# Patient Record
Sex: MALE | Race: WHITE | NOT HISPANIC OR LATINO | Employment: OTHER | URBAN - METROPOLITAN AREA
[De-identification: names, ages, dates, MRNs, and addresses within clinical notes are randomized per-mention and may not be internally consistent; named-entity substitution may affect disease eponyms.]

---

## 2018-06-20 ENCOUNTER — HOSPITAL ENCOUNTER (EMERGENCY)
Facility: CLINIC | Age: 58
Discharge: HOME | End: 2018-06-20
Attending: EMERGENCY MEDICINE | Admitting: EMERGENCY MEDICINE
Payer: COMMERCIAL

## 2018-06-20 VITALS
WEIGHT: 278 LBS | RESPIRATION RATE: 18 BRPM | HEIGHT: 72 IN | OXYGEN SATURATION: 99 % | DIASTOLIC BLOOD PRESSURE: 80 MMHG | TEMPERATURE: 98 F | SYSTOLIC BLOOD PRESSURE: 129 MMHG | BODY MASS INDEX: 37.65 KG/M2 | HEART RATE: 84 BPM

## 2018-06-20 DIAGNOSIS — R94.31 ABNORMAL ELECTROCARDIOGRAM: ICD-10-CM

## 2018-06-20 DIAGNOSIS — R07.9 ACUTE CHEST PAIN: ICD-10-CM

## 2018-06-20 DIAGNOSIS — D64.9 ANEMIA, UNSPECIFIED TYPE: ICD-10-CM

## 2018-06-20 LAB
ALBUMIN SERPL-MCNC: 3.5 G/DL (ref 3.4–5)
ALP SERPL-CCNC: 73 U/L (ref 40–150)
ALT SERPL W P-5'-P-CCNC: 26 U/L (ref 0–70)
ANION GAP SERPL CALCULATED.3IONS-SCNC: 10 MMOL/L (ref 3–14)
AST SERPL W P-5'-P-CCNC: 23 U/L (ref 0–45)
BASOPHILS # BLD AUTO: 0 10E9/L (ref 0–0.2)
BASOPHILS NFR BLD AUTO: 0.2 %
BILIRUB SERPL-MCNC: 0.4 MG/DL (ref 0.2–1.3)
BUN SERPL-MCNC: 17 MG/DL (ref 7–30)
CALCIUM SERPL-MCNC: 8.2 MG/DL (ref 8.5–10.1)
CHLORIDE SERPL-SCNC: 99 MMOL/L (ref 94–109)
CO2 SERPL-SCNC: 28 MMOL/L (ref 20–32)
CREAT SERPL-MCNC: 0.99 MG/DL (ref 0.66–1.25)
D DIMER PPP FEU-MCNC: <0.3 UG/ML FEU (ref 0–0.5)
DIFFERENTIAL METHOD BLD: ABNORMAL
EOSINOPHIL # BLD AUTO: 0.2 10E9/L (ref 0–0.7)
EOSINOPHIL NFR BLD AUTO: 3.5 %
ERYTHROCYTE [DISTWIDTH] IN BLOOD BY AUTOMATED COUNT: 15.1 % (ref 10–15)
GFR SERPL CREATININE-BSD FRML MDRD: 77 ML/MIN/1.7M2
GLUCOSE SERPL-MCNC: 156 MG/DL (ref 70–99)
HCT VFR BLD AUTO: 34.9 % (ref 40–53)
HGB BLD-MCNC: 11.9 G/DL (ref 13.3–17.7)
IMM GRANULOCYTES # BLD: 0 10E9/L (ref 0–0.4)
IMM GRANULOCYTES NFR BLD: 0.2 %
INTERPRETATION ECG - MUSE: NORMAL
LIPASE SERPL-CCNC: 130 U/L (ref 73–393)
LYMPHOCYTES # BLD AUTO: 1 10E9/L (ref 0.8–5.3)
LYMPHOCYTES NFR BLD AUTO: 16.2 %
MCH RBC QN AUTO: 32.3 PG (ref 26.5–33)
MCHC RBC AUTO-ENTMCNC: 34.1 G/DL (ref 31.5–36.5)
MCV RBC AUTO: 95 FL (ref 78–100)
MONOCYTES # BLD AUTO: 0.6 10E9/L (ref 0–1.3)
MONOCYTES NFR BLD AUTO: 9.6 %
NEUTROPHILS # BLD AUTO: 4.4 10E9/L (ref 1.6–8.3)
NEUTROPHILS NFR BLD AUTO: 70.3 %
NRBC # BLD AUTO: 0 10*3/UL
NRBC BLD AUTO-RTO: 0 /100
PLATELET # BLD AUTO: 161 10E9/L (ref 150–450)
POTASSIUM SERPL-SCNC: 4.1 MMOL/L (ref 3.4–5.3)
PROT SERPL-MCNC: 7.4 G/DL (ref 6.8–8.8)
RBC # BLD AUTO: 3.68 10E12/L (ref 4.4–5.9)
SODIUM SERPL-SCNC: 137 MMOL/L (ref 133–144)
TROPONIN I SERPL-MCNC: <0.015 UG/L (ref 0–0.04)
WBC # BLD AUTO: 6.2 10E9/L (ref 4–11)

## 2018-06-20 PROCEDURE — 85025 COMPLETE CBC W/AUTO DIFF WBC: CPT | Performed by: EMERGENCY MEDICINE

## 2018-06-20 PROCEDURE — 93005 ELECTROCARDIOGRAM TRACING: CPT

## 2018-06-20 PROCEDURE — 85379 FIBRIN DEGRADATION QUANT: CPT | Performed by: EMERGENCY MEDICINE

## 2018-06-20 PROCEDURE — 80053 COMPREHEN METABOLIC PANEL: CPT | Performed by: EMERGENCY MEDICINE

## 2018-06-20 PROCEDURE — 84484 ASSAY OF TROPONIN QUANT: CPT | Performed by: EMERGENCY MEDICINE

## 2018-06-20 PROCEDURE — 99284 EMERGENCY DEPT VISIT MOD MDM: CPT

## 2018-06-20 PROCEDURE — 83690 ASSAY OF LIPASE: CPT | Performed by: EMERGENCY MEDICINE

## 2018-06-20 ASSESSMENT — ENCOUNTER SYMPTOMS
FEVER: 0
CHEST TIGHTNESS: 1
COUGH: 0

## 2018-06-20 NOTE — ED AVS SNAPSHOT
Emergency Department    [address] [facility] [address]    Phone:  [phone]    Fax:  [phone]                                       Fer Myles   MRN: [id number]    Department:   Emergency Department   Date of Visit:  6/20/2018           Patient Information     Date Of Birth          1960        Your diagnoses for this visit were:     Acute chest pain     Abnormal electrocardiogram     Anemia, unspecified type        You were seen by Heath Wright MD.      Follow-up Information     Follow up with [facility] [address] [facility]. Schedule an appointment as soon as possible for a visit today.    Contact information:    [address] [place] [address]          Follow up with  Emergency Department.    Specialty:  EMERGENCY MEDICINE    Why:  As needed, If symptoms worsen    Contact information:    [address] [facility] [address] [phone]      Discharge References/Attachments     CHEST PAIN, UNCERTAIN CAUSE (ENGLISH)      24 Hour Appointment Hotline       To make an appointment at any [facility], call [phone] ([phone]). If you don't have a family doctor or clinic, we will help you find one. [facility] clinics are conveniently located to serve the needs of you and your family.             Review of your medicines      Our records show that you are taking the medicines listed below. If these are incorrect, please call your family doctor or clinic.        Dose / Directions Last dose taken    ACTOS PO        1 TABLET DAILY   Refills:  0        ALLOPURINOL PO        Take  by mouth.   Refills:  0        BALSALAZIDE DISODIUM PO        Take  by mouth.   Refills:  0        FERROUS GLUCONATE PO   Dose:  324 mg        Take 324 mg by mouth.   Refills:  0        LEXAPRO PO        Take  by mouth.   Refills:  0        LISINOPRIL PO        1 TABLET DAILY   Refills:  0        METFORMIN HCL PO        Take  by mouth.   Refills:  0         multivitamin per tablet   Dose:  1 tablet        Take 1 tablet by mouth daily.   Refills:  0        order for DME   Quantity:  1 Device        Equipment being ordered: tennis elbow brace   Refills:  0        PANTOPRAZOLE SODIUM PO        Take  by mouth.   Refills:  0        ZOLOFT 100 MG tablet   Generic drug:  sertraline        1 TABLET DAILY   Refills:  0                Procedures and tests performed during your visit     CBC with platelets differential    Cardiac Continuous Monitoring    Comprehensive metabolic panel    D dimer quantitative    EKG 12-lead, tracing only    Lipase    Pulse oximetry nursing    Troponin I      Orders Needing Specimen Collection     None      Pending Results     No orders found from 6/18/2018 to 6/21/2018.            Pending Culture Results     No orders found from 6/18/2018 to 6/21/2018.            Pending Results Instructions     If you had any lab results that were not finalized at the time of your Discharge, you can call the ED Lab Result RN at [phone]. You will be contacted by this team for any positive Lab results or changes in treatment. The nurses are available 7 days a week from 10A to 6:30P.  You can leave a message 24 hours per day and they will return your call.        Test Results From Your Hospital Stay        6/20/2018  1:04 PM      Component Results     Component Value Ref Range & Units Status    WBC 6.2 4.0 - 11.0 10e9/L Final    RBC Count 3.68 (L) 4.4 - 5.9 10e12/L Final    Hemoglobin 11.9 (L) 13.3 - 17.7 g/dL Final    Hematocrit 34.9 (L) 40.0 - 53.0 % Final    MCV 95 78 - 100 fl Final    MCH 32.3 26.5 - 33.0 pg Final    MCHC 34.1 31.5 - 36.5 g/dL Final    RDW 15.1 (H) 10.0 - 15.0 % Final    Platelet Count 161 150 - 450 10e9/L Final    Diff Method Automated Method  Final    % Neutrophils 70.3 % Final    % Lymphocytes 16.2 % Final    % Monocytes 9.6 % Final    % Eosinophils 3.5 % Final    % Basophils 0.2 % Final    % Immature Granulocytes 0.2 % Final    Nucleated  RBCs 0 0 /100 Final    Absolute Neutrophil 4.4 1.6 - 8.3 10e9/L Final    Absolute Lymphocytes 1.0 0.8 - 5.3 10e9/L Final    Absolute Monocytes 0.6 0.0 - 1.3 10e9/L Final    Absolute Eosinophils 0.2 0.0 - 0.7 10e9/L Final    Absolute Basophils 0.0 0.0 - 0.2 10e9/L Final    Abs Immature Granulocytes 0.0 0 - 0.4 10e9/L Final    Absolute Nucleated RBC 0.0  Final         6/20/2018  1:27 PM      Component Results     Component Value Ref Range & Units Status    Sodium 137 133 - 144 mmol/L Final    Potassium 4.1 3.4 - 5.3 mmol/L Final    Chloride 99 94 - 109 mmol/L Final    Carbon Dioxide 28 20 - 32 mmol/L Final    Anion Gap 10 3 - 14 mmol/L Final    Glucose 156 (H) 70 - 99 mg/dL Final    Urea Nitrogen 17 7 - 30 mg/dL Final    Creatinine 0.99 0.66 - 1.25 mg/dL Final    GFR Estimate 77 >60 mL/min/1.7m2 Final    Non  GFR Calc    GFR Estimate If Black >90 >60 mL/min/1.7m2 Final    African American GFR Calc    Calcium 8.2 (L) 8.5 - 10.1 mg/dL Final    Bilirubin Total 0.4 0.2 - 1.3 mg/dL Final    Albumin 3.5 3.4 - 5.0 g/dL Final    Protein Total 7.4 6.8 - 8.8 g/dL Final    Alkaline Phosphatase 73 40 - 150 U/L Final    ALT 26 0 - 70 U/L Final    AST 23 0 - 45 U/L Final         6/20/2018  1:28 PM      Component Results     Component Value Ref Range & Units Status    Troponin I ES <0.015 0.000 - 0.045 ug/L Final    The 99th percentile for upper reference range is 0.045 ug/L.  Troponin values   in the range of 0.045 - 0.120 ug/L may be associated with risks of adverse   clinical events.           6/20/2018  1:52 PM      Component Results     Component Value Ref Range & Units Status    D Dimer <0.3 0.0 - 0.50 ug/ml FEU Final    This D-dimer assay is intended for use in conjunction with a clinical pretest   probability assessment model to exclude pulmonary embolism (PE) and deep   venous thrombosis (DVT) in outpatients suspected of PE or DVT. The cut-off   value is 0.5 ug/mL FEU.                 6/20/2018  1:34 PM       Component Results     Component Value Ref Range & Units Status    Lipase 130 73 - 393 U/L Final                Clinical Quality Measure: Blood Pressure Screening     Your blood pressure was checked while you were in the emergency department today. The last reading we obtained was  BP: 110/59 . Please read the guidelines below about what these numbers mean and what you should do about them.  If your systolic blood pressure (the top number) is less than 120 and your diastolic blood pressure (the bottom number) is less than 80, then your blood pressure is normal. There is nothing more that you need to do about it.  If your systolic blood pressure (the top number) is 120-139 or your diastolic blood pressure (the bottom number) is 80-89, your blood pressure may be higher than it should be. You should have your blood pressure rechecked within a year by a primary care provider.  If your systolic blood pressure (the top number) is 140 or greater or your diastolic blood pressure (the bottom number) is 90 or greater, you may have high blood pressure. High blood pressure is treatable, but if left untreated over time it can put you at risk for heart attack, stroke, or kidney failure. You should have your blood pressure rechecked by a primary care provider within the next 4 weeks.  If your provider in the emergency department today gave you specific instructions to follow-up with your doctor or provider even sooner than that, you should follow that instruction and not wait for up to 4 weeks for your follow-up visit.        Thank you for choosing [facility]       Thank you for choosing [facility] for your care. Our goal is always to provide you with excellent care. Hearing back from our patients is one way we can continue to improve our services. Please take a few minutes to complete the written survey that you may receive in the mail after you visit with us. Thank you!        "[employer] Information     "[employer] lets you send messages  "to your doctor, view your test results, renew your prescriptions, schedule appointments and more. To sign up, go to [email or web address] . Click on \"Log in\" on the left side of the screen, which will take you to the Welcome page. Then click on \"Sign up Now\" on the right side of the page.     You will be asked to enter the access code listed below, as well as some personal information. Please follow the directions to create your username and password.     Your access code is: [email or web address]  Expires: 2018  2:07 PM     Your access code will  in 90 days. If you need help or a new code, please call your [facility] clinic or [phone].        Care EveryWhere ID     This is your Care EveryWhere ID. This could be used by other organizations to access your [facility] medical records  [id number]        Equal Access to Services     TED WONG : Hadii george Ash, waaxda lukiera, qaybta kaalmada mu, andrea sandoval . So [facility] [phone].    ATENCIÓN: Si habla español, tiene a simmons disposición servicios gratuitos de asistencia lingüística. Llame al [phone].    We comply with applicable federal civil rights laws and [place] laws. We do not discriminate on the basis of race, color, national origin, age, disability, sex, sexual orientation, or gender identity.            After Visit Summary       This is your record. Keep this with you and show to your community pharmacist(s) and doctor(s) at your next visit.                  "

## 2018-06-20 NOTE — ED PROVIDER NOTES
History     Chief Complaint:  Chest Pain    HPI   Fer Myles is a 57 year old male who presents with chest pain. 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 He states that this tightness has progressed into a dull, constant pain. The patient has not taken any medication for the pain since onset other than alkaseltzer. He denies any injuries, coughs, or fevers associated with the pain and has no history of heart, lung, or clotting issues.  He has never had any advanced cardiac testing.  He is most worried this might be his heart.  No vomiting or diarrhea.  He has never been a smoker.    Allergies:  Amoxicillin     Medications:    Actos  Allopurinol  Balsalazide disodium  Lexapro  Lisinopril  Metformin  Zoloft     Past Medical History:    Diabetes  Hypertension    Past Surgical History:    The patient does not have any pertinent past surgical history.    Family History:    No past pertinent family history.    Social History:  Patient presents alone.  Positive for alcohol use.   Negative for alcohol use.     Review of Systems   Constitutional: Negative for fever.   Respiratory: Positive for chest tightness. Negative for cough.    Cardiovascular: Positive for chest pain.   All other systems reviewed and are negative.    Physical Exam   First Vitals:  BP: 126/79  Pulse: 84  Heart Rate: 73  Temp: 98  F (36.7  C)  Resp: 18  Height: 182.9 cm (6')  Weight: 126.1 kg (278 lb)  SpO2: 97 %      Physical Exam  General: Nontoxic-appearing male sitting upright in room 4  HENT: mucous membranes moist  CV: regular rate, regular rhythm, no lower extremity edema, no JVD, palpable symmetric radial pulses  Resp: clear throughout, normal effort, no crackles or wheezing  GI: abdomen soft and nontender, no guarding, negative Madison's sign  MSK: no bony tenderness to chest  Skin: appropriately warm and dry, no erythema or vesicles to chest wall  Neuro: alert, clear  speech, oriented   Psych: normal mood and affect      Emergency Department Course   ECG:  Indication: Chest pain  Time: 1203  Vent. Rate 83 bpm. NH interval 148. QRS duration 84. QT/QTc 382/448. P-R-T axis 50 -14 27 TWI V2-V3.  Read time: 1309    Laboratory:  CBC: WBC: 6.2, HGB: 11.9, PLT: 161  CMP: Glucose 156 (H), Calcium: 8.2 (L), o/w WNL (Creatinine: 0.99)  Troponin: <0.015  D dimer: <0.3  Lipase: 130    Emergency Department Course:   Nursing notes and vitals reviewed.  1307: I performed an exam of the patient as documented above. IV inserted and blood drawn.  Labs and imaging ordered.    The patient was placed on continuous cardiac and pulse ox monitoring.  I performed electronic chart review in EPIC.    I ordered a chest x-ray.  The x-ray tech came to get the patient, though the patient at that point declined the test.  I want to talk with them soon thereafter.    1359: I rechecked the patient. Findings and plan explained to the Patient.    Impression & Plan      Medical Decision Making:  The cause of his acute chest discomfort remains unconfirmed.  I did specifically discuss with him my concern for the abnormal EKG, compared to the last available comparison in 2009, which could be consistent with coronary ischemia though this is nondiagnostic.  While some reassurance is provided by an undetectable troponin, I remain concerned about the possibility of underlying coronary disease and strongly recommended that he be hospitalized for close monitoring, serial troponins, and further care.  His normal d-dimer makes pulmonary embolism extremely unlikely.  His vital signs and chest exam do not raise high concern for edema or infiltrate or pneumothorax though he did not wish to undergo chest x-ray to help identify or rule out these possibilities.  In the end, he cited a strong preference for discharge, and accepted my caution to him that this decision may lead to permanent consequences and even death in the short-term  "due to failure to diagnose more serious underlying cause.  He demonstrates decision-making capacity.  His desire for discharge is based largely on the fact that he feels better after while in the emergency department, which he thinks is related to having passed some gas.  Is a completely benign abdominal exam and I do not think that a primary abdominal process is likely causing his presenting symptoms though this is unconfirmed.  He was explicitly asked to return here for acute worsening at any hour.  Close follow-up through clinic will be essential.    Diagnosis:    ICD-10-CM    1. Acute chest pain R07.9    2. Abnormal electrocardiogram R94.31    3. Anemia, unspecified type D64.9        This record was created at least in part using electronic voice recognition software, so please excuse any \"typos.\"    I, Dae Dial, am serving as a scribe on 6/20/2018 at 1:07 PM to personally document services performed by Heath Wright MD based on my observations and the provider's statements to me.       Dae Dial  6/20/2018    EMERGENCY DEPARTMENT       Heath Wright MD  06/20/18 1554    "

## 2018-06-20 NOTE — ED AVS SNAPSHOT
Emergency Department    [address] [place] [address]    Phone:  [phone]    Fax:  [phone]                                       Fer Myles   MRN: [id number]    Department:   Emergency Department   Date of Visit:  6/20/2018           After Visit Summary Signature Page     I have received my discharge instructions, and my questions have been answered. I have discussed any challenges I see with this plan with the nurse or doctor.    ..........................................................................................................................................  Patient/Patient Representative Signature      ..........................................................................................................................................  Patient Representative Print Name and Relationship to Patient    ..................................................               ................................................  Date                                            Time    ..........................................................................................................................................  Reviewed by Signature/Title    ...................................................              ..............................................  Date                                                            Time

## 2020-03-03 ENCOUNTER — TRANSFERRED RECORDS (OUTPATIENT)
Dept: HEALTH INFORMATION MANAGEMENT | Facility: CLINIC | Age: 60
End: 2020-03-03

## 2021-03-03 ENCOUNTER — TELEPHONE (OUTPATIENT)
Dept: WOUND CARE | Facility: CLINIC | Age: 61
End: 2021-03-03

## 2021-03-03 ENCOUNTER — TRANSFERRED RECORDS (OUTPATIENT)
Dept: HEALTH INFORMATION MANAGEMENT | Facility: CLINIC | Age: 61
End: 2021-03-03

## 2021-03-03 LAB
ALT SERPL-CCNC: 7 IU/L (ref 0–44)
AST SERPL-CCNC: 15 IU/L (ref 0–40)
CREAT SERPL-MCNC: 0.92 MG/DL (ref 0.76–1.27)
GFR SERPL CREATININE-BSD FRML MDRD: 89 ML/MIN/1.73M2
GLUCOSE SERPL-MCNC: 114 MG/DL (ref 65–99)
POTASSIUM SERPL-SCNC: 4.6 MMOL/L (ref 3.5–5.2)

## 2021-03-03 NOTE — TELEPHONE ENCOUNTER
New patient called and he has had a leg wound for the past 15 years.   He was referred by Dr. Ilda Lynn from [facility].

## 2021-03-04 ENCOUNTER — HOSPITAL ENCOUNTER (EMERGENCY)
Facility: CLINIC | Age: 61
Discharge: HOME | End: 2021-03-04
Attending: EMERGENCY MEDICINE | Admitting: EMERGENCY MEDICINE
Payer: COMMERCIAL

## 2021-03-04 ENCOUNTER — APPOINTMENT (OUTPATIENT)
Dept: ULTRASOUND IMAGING | Facility: CLINIC | Age: 61
End: 2021-03-04
Attending: EMERGENCY MEDICINE
Payer: COMMERCIAL

## 2021-03-04 VITALS
HEIGHT: 71 IN | WEIGHT: 270 LBS | SYSTOLIC BLOOD PRESSURE: 128 MMHG | OXYGEN SATURATION: 99 % | TEMPERATURE: 97.7 F | RESPIRATION RATE: 18 BRPM | BODY MASS INDEX: 37.8 KG/M2 | HEART RATE: 90 BPM | DIASTOLIC BLOOD PRESSURE: 79 MMHG

## 2021-03-04 DIAGNOSIS — I48.91 ATRIAL FIBRILLATION, UNSPECIFIED TYPE (H): ICD-10-CM

## 2021-03-04 LAB
ANION GAP SERPL CALCULATED.3IONS-SCNC: 8 MMOL/L (ref 3–14)
BASOPHILS # BLD AUTO: 0 10E9/L (ref 0–0.2)
BASOPHILS NFR BLD AUTO: 0.4 %
BUN SERPL-MCNC: 13 MG/DL (ref 7–30)
CALCIUM SERPL-MCNC: 8.7 MG/DL (ref 8.5–10.1)
CHLORIDE SERPL-SCNC: 100 MMOL/L (ref 94–109)
CO2 SERPL-SCNC: 29 MMOL/L (ref 20–32)
CREAT SERPL-MCNC: 0.73 MG/DL (ref 0.66–1.25)
DIFFERENTIAL METHOD BLD: ABNORMAL
EOSINOPHIL # BLD AUTO: 0.2 10E9/L (ref 0–0.7)
EOSINOPHIL NFR BLD AUTO: 2.3 %
ERYTHROCYTE [DISTWIDTH] IN BLOOD BY AUTOMATED COUNT: 15.6 % (ref 10–15)
GFR SERPL CREATININE-BSD FRML MDRD: >90 ML/MIN/{1.73_M2}
GLUCOSE SERPL-MCNC: 123 MG/DL (ref 70–99)
HCT VFR BLD AUTO: 39.3 % (ref 40–53)
HGB BLD-MCNC: 12.7 G/DL (ref 13.3–17.7)
IMM GRANULOCYTES # BLD: 0 10E9/L (ref 0–0.4)
IMM GRANULOCYTES NFR BLD: 0.4 %
LYMPHOCYTES # BLD AUTO: 1.5 10E9/L (ref 0.8–5.3)
LYMPHOCYTES NFR BLD AUTO: 19.1 %
MCH RBC QN AUTO: 32.6 PG (ref 26.5–33)
MCHC RBC AUTO-ENTMCNC: 32.3 G/DL (ref 31.5–36.5)
MCV RBC AUTO: 101 FL (ref 78–100)
MONOCYTES # BLD AUTO: 0.5 10E9/L (ref 0–1.3)
MONOCYTES NFR BLD AUTO: 6.5 %
NEUTROPHILS # BLD AUTO: 5.6 10E9/L (ref 1.6–8.3)
NEUTROPHILS NFR BLD AUTO: 71.3 %
NRBC # BLD AUTO: 0 10*3/UL
NRBC BLD AUTO-RTO: 0 /100
NT-PROBNP SERPL-MCNC: 705 PG/ML (ref 0–900)
PLATELET # BLD AUTO: 270 10E9/L (ref 150–450)
POTASSIUM SERPL-SCNC: 4.4 MMOL/L (ref 3.4–5.3)
RBC # BLD AUTO: 3.89 10E12/L (ref 4.4–5.9)
SODIUM SERPL-SCNC: 137 MMOL/L (ref 133–144)
TROPONIN I SERPL-MCNC: <0.015 UG/L (ref 0–0.04)
WBC # BLD AUTO: 7.9 10E9/L (ref 4–11)

## 2021-03-04 PROCEDURE — 85025 COMPLETE CBC W/AUTO DIFF WBC: CPT | Performed by: EMERGENCY MEDICINE

## 2021-03-04 PROCEDURE — 93005 ELECTROCARDIOGRAM TRACING: CPT

## 2021-03-04 PROCEDURE — 93005 ELECTROCARDIOGRAM TRACING: CPT | Mod: 76

## 2021-03-04 PROCEDURE — 84484 ASSAY OF TROPONIN QUANT: CPT | Performed by: EMERGENCY MEDICINE

## 2021-03-04 PROCEDURE — 80048 BASIC METABOLIC PNL TOTAL CA: CPT | Performed by: EMERGENCY MEDICINE

## 2021-03-04 PROCEDURE — 83880 ASSAY OF NATRIURETIC PEPTIDE: CPT | Performed by: EMERGENCY MEDICINE

## 2021-03-04 PROCEDURE — 93970 EXTREMITY STUDY: CPT

## 2021-03-04 PROCEDURE — 250N000013 HC RX MED GY IP 250 OP 250 PS 637: Performed by: EMERGENCY MEDICINE

## 2021-03-04 PROCEDURE — 99285 EMERGENCY DEPT VISIT HI MDM: CPT | Mod: 25

## 2021-03-04 RX ORDER — METOPROLOL TARTRATE 25 MG/1
25 TABLET, FILM COATED ORAL 2 TIMES DAILY
Qty: 60 TABLET | Refills: 0 | Status: SHIPPED | OUTPATIENT
Start: 2021-03-04 | End: 2021-05-18

## 2021-03-04 RX ORDER — METOPROLOL TARTRATE 25 MG/1
25 TABLET, FILM COATED ORAL ONCE
Status: COMPLETED | OUTPATIENT
Start: 2021-03-04 | End: 2021-03-04

## 2021-03-04 RX ADMIN — METOPROLOL TARTRATE 25 MG: 25 TABLET, FILM COATED ORAL at 17:09

## 2021-03-04 ASSESSMENT — MIFFLIN-ST. JEOR: SCORE: 2051.84

## 2021-03-04 ASSESSMENT — ENCOUNTER SYMPTOMS
SHORTNESS OF BREATH: 0
FEVER: 0
WOUND: 1

## 2021-03-04 NOTE — ED PROVIDER NOTES
History   Chief Complaint:  rule out DVT     HPI   Fer Myles is a 60 year old male with history of type 2 diabetes and hypertension who presents with concern for DVT. The patient states that he was seen in clinic yesterday for a cataract surgery pre-op appointment and his PA, Ilda Lynn. She was concerned for left leg swelling and an abnormal EKG, prompting additional laboratory tests. The patient states that he has chronic leg edema after surgery 15 years ago and it is standard for his legs to fluctuate in size. The patient was contacted today and advised to come to the emergency department out of concern for a DVT after the labs resulted. Care Everywhere is not accessible to view the laboratory results. His typical PCP is currently deployed in the [employer], so he is not familiar with this provider and denied any complaints on exam. Denies any shortness of breath, chest pain or fever. He does note two calf ulcers that resolve on their own.     Review of Systems   Constitutional: Negative for fever.   Respiratory: Negative for shortness of breath.    Cardiovascular: Positive for leg swelling. Negative for chest pain.   Skin: Positive for wound.   All other systems reviewed and are negative.        Allergies:  Amoxicillin     Medications:  Lisinopril   Metformin   Zoloft   Lacets   Glimepiride   Lasix   Balsalazide Disodium   Allopurinol   Actos   Co Q10     Past Medical History:    Cataract   Type 2 diabetes   Hypertension    Sleep apnea   Ulcerative colitis     Past Surgical History:    Gastric bypass   EGD   Subdural hematoma     Social History:  Presents to emergency department alone    Physical Exam     Patient Vitals for the past 24 hrs:   BP Temp Temp src Pulse Resp SpO2 Height Weight   03/04/21 1812 -- -- -- 90 18 99 % -- --   03/04/21 1745 128/79 -- -- 80 (!) 32 99 % -- --   03/04/21 1730 118/76 -- -- 93 20 98 % -- --   03/04/21 1715 123/73 -- -- 71 21 98 % -- --   03/04/21 1709 (!) 132/97 --  "-- 113 -- -- -- --   03/04/21 1700 (!) 132/97 -- -- 149 (!) 42 -- -- --   03/04/21 1630 106/74 -- -- 110 -- 99 % -- --   03/04/21 1530 116/89 -- -- 89 -- 99 % -- --   03/04/21 1515 106/85 -- -- 74 -- 99 % -- --   03/04/21 1500 122/77 -- -- 63 -- 100 % -- --   03/04/21 1445 110/68 -- -- 63 -- 98 % -- --   03/04/21 1349 (!) 122/103 97.7  F (36.5  C) Temporal 71 18 100 % 1.803 m (5' 11\") 122.5 kg (270 lb)       Physical Exam  General: Patient is alert and cooperative.  HENT:  Normal appearance.   Eyes: EOMI. Normal conjunctiva.  Neck:  Normal range of motion and appearance.   Cardiovascular:  variable rate, irregular rhythm and normal heart sounds.   Pulmonary/Chest:  Effort normal. No wheezing or crackles.  Abdominal: Soft. No distension or tenderness.     Musculoskeletal: Normal range of motion. Mild symmetric lower leg edema.   Neurological: oriented, normal strength, sensation, and coordination.   Skin: Warm and dry. Darkened changes lower legs. One small recently opened area lateral lower leg, no sign of infection.   Psychiatric: Normal mood and affect. Normal behavior and judgement.      Emergency Department Course   ECG #1  ECG taken at 14:07:39, ECG read at 1705  Atrial fibrillation with rapid ventricular response with premature ventricular or aberrantly conducted complexes. Nonspecific ST & T weave abnormality. Abnormal ECG.    No prior ECG  Rate 107 bpm. SC interval * ms. QRS duration 90 ms. QT/QTc 368/491 ms. P-R-T axes * -11 31.     ECG #2   ECG taken at 16:54:46, ECG read at 1705  Atrial fibrillation with rapid ventricular response. Nonspecific ST abnormality. Abnormal ECG.    No significant change as compared to prior, dated 03/04/2021.  Rate 120 bpm. SC interval * ms. QRS duration 86 ms. QT/QTc 340//480 ms. P-R-T axes * -13 32.     ECG #3  ECG taken at 16:57:28, ECG read at 1705  Atrial fibrillation with premature ventricular or aberrantly conducted complexes. Abnormal ECG.   No significant change as " compared to prior, dated 03/04/2021.  Rate 96 bpm. MN interval * ms. QRS duration 88 ms. QT/QTc 366/462 ms. P-R-T axes * -7 30.     Imaging:  US Lower Extremity Venous Duplex Bilateral   IMPRESSION: No DVT demonstrated.  As read by Radiology.     Laboratory:  CBC: WBC: 7.9, HGB: 12.7 (L), PLT: 270  BMP: Glucose 123 (H), o/w WNL (Creatinine: 0.73)  Troponin (Collected 1434): <0.015  BNP: 705   Emergency Department Course:    Reviewed:  I reviewed nursing notes, vitals, past medical history and care everywhere    Assessments:  1636 I obtained history and examined the patient as noted above.   1658 I reassessment I noted that the patient was in Afib. He denied any symptoms.     Interventions   1709 Lopressor 25 mg PO     Disposition:  The patient was discharged to home.       Impression & Plan   Medical Decision Making:  Asymptomatic 60-year-old male referred from clinic to the emergency department for ultrasound to rule out DVT.  He presented to clinic by his report for a preoperative evaluation for planned cataract surgery later this month.  He has some chronic lower leg edema and testing in clinic included a minimally elevated D-dimer.  He has no history of PE or DVT nor is he reporting any acute changes to his leg.  Additional testing in clinic included an EKG depicting a sinus rhythm.  Other labs including renal function and CBC are unremarkable.  An ultrasound was negative.  Interestingly, as he was being prepared for discharge, I reviewed an EKG which was performed early in his ED course which appeared to demonstrate atrial fibrillation.  I recommended a repeat EKG which clearly again read demonstrates atrial fibrillation with a rapid ventricular response.  He is completely asymptomatic with respect to this.  He has no sense of heart palpitations despite his heart rate being variably between 101 150.  He appears to be experiencing paroxysmal atrial fibrillation clearly uncertain how long this has been going on.   He was medicated with oral metoprolol and replaced on the cardiac monitor where his rate seems to generally be trending downward.  He is remained normotensive.  I explained the clinical significance of atrial fibrillation to him.  His [id number] score is 2 and therefore anticoagulation is recommended.  He declines this at this time.  He is interested in proceeding with surgery later this month and is concerned that that may delay things.  I explained to him the rationale behind anticoagulation as well as the fact that this new diagnosis may in and of itself prompted delay in preoperative clearance.  I recommended he continue his current medical regimen and will be adding metoprolol which hopefully will provide some rate control for him and advised that he contact his primary care clinic tomorrow to discuss this new issue and for recheck and further management which may include adjustment of his metoprolol, cardiology referral, and echocardiography as well as recommendations for chronic anticoagulation.    Diagnosis:    ICD-10-CM    1. Atrial fibrillation, unspecified type (H)  I48.91        Discharge Medications:  New Prescriptions    METOPROLOL TARTRATE (LOPRESSOR) 25 MG TABLET    Take 1 tablet (25 mg) by mouth 2 times daily       Scribe Disclosure:  RODNEY, Oliva Rubin, am serving as a scribe at 3:05 PM on 3/4/2021 to document services personally performed by Rupesh Irvin MD based on my observations and the provider's statements to me.           Rupesh Irvin MD  03/05/21 1200

## 2021-03-04 NOTE — ED TRIAGE NOTES
Patient comes in referred from clinic for evaluation of peripheral edema, rule out DVT, wounds on feet and elevated d-dimer in clinic yesterday. Patient is also ha a history of CHF and DM. ABCs intact. Denies chest pain or shortness of breath.

## 2021-03-05 LAB
INTERPRETATION ECG - MUSE: NORMAL
INTERPRETATION ECG - MUSE: NORMAL

## 2021-03-08 ENCOUNTER — OFFICE VISIT (OUTPATIENT)
Dept: CARDIOLOGY | Facility: CLINIC | Age: 61
End: 2021-03-08
Payer: COMMERCIAL

## 2021-03-08 VITALS
WEIGHT: 277 LBS | HEIGHT: 71 IN | BODY MASS INDEX: 38.78 KG/M2 | HEART RATE: 63 BPM | DIASTOLIC BLOOD PRESSURE: 76 MMHG | OXYGEN SATURATION: 99 % | SYSTOLIC BLOOD PRESSURE: 114 MMHG

## 2021-03-08 DIAGNOSIS — I48.91 NEW ONSET ATRIAL FIBRILLATION (H): Primary | ICD-10-CM

## 2021-03-08 LAB — INTERPRETATION ECG - MUSE: NORMAL

## 2021-03-08 PROCEDURE — 99204 OFFICE O/P NEW MOD 45 MIN: CPT | Performed by: INTERNAL MEDICINE

## 2021-03-08 PROCEDURE — 93000 ELECTROCARDIOGRAM COMPLETE: CPT | Performed by: INTERNAL MEDICINE

## 2021-03-08 RX ORDER — BLOOD SUGAR DIAGNOSTIC
STRIP MISCELLANEOUS
COMMUNITY
Start: 2021-01-17 | End: 2023-01-01

## 2021-03-08 RX ORDER — LISINOPRIL 20 MG/1
20 TABLET ORAL DAILY
COMMUNITY
Start: 2021-02-13

## 2021-03-08 RX ORDER — VITAMIN B COMPLEX
TABLET ORAL
COMMUNITY
End: 2021-05-18

## 2021-03-08 RX ORDER — FUROSEMIDE 20 MG
TABLET ORAL
COMMUNITY
Start: 2021-02-11 | End: 2021-12-29

## 2021-03-08 RX ORDER — ALLOPURINOL 100 MG/1
100 TABLET ORAL DAILY
COMMUNITY
Start: 2021-02-13 | End: 2021-12-29

## 2021-03-08 RX ORDER — LANCETS
EACH MISCELLANEOUS
COMMUNITY
Start: 2020-11-20 | End: 2023-01-01

## 2021-03-08 RX ORDER — TORSEMIDE 20 MG/1
20 TABLET ORAL DAILY
COMMUNITY
Start: 2021-03-04 | End: 2021-05-18

## 2021-03-08 RX ORDER — INDOMETHACIN 50 MG/1
50 CAPSULE ORAL
COMMUNITY
End: 2021-07-14

## 2021-03-08 RX ORDER — MULTIVIT WITH MINERALS/LUTEIN
6000 TABLET ORAL DAILY
COMMUNITY
End: 2021-07-14

## 2021-03-08 ASSESSMENT — MIFFLIN-ST. JEOR: SCORE: 2083.59
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
## 2021-03-24 NOTE — PROGRESS NOTES
Service Date: 2021      ADDENDUM:  After the clinic visit, I was informed that the patient is a regular heavy alcohol user.  He is drinking about 1 pint of brittany daily.  That will be considered in the future planning of his AFib management, including consideration of anticoagulation.         LAURYN LYNN MD             D: 2021   T: 2021   MT: iona      Name:     EMILEE BERNABE   MRN:      [id number]        Account:      [id number]   :      1960           Service Date: 2021      Document: [id number]

## 2021-04-29 ENCOUNTER — HOSPITAL ENCOUNTER (OUTPATIENT)
Dept: CARDIOLOGY | Facility: CLINIC | Age: 61
End: 2021-04-29
Attending: INTERNAL MEDICINE
Payer: COMMERCIAL

## 2021-04-29 DIAGNOSIS — I48.91 NEW ONSET ATRIAL FIBRILLATION (H): ICD-10-CM

## 2021-04-29 PROCEDURE — 93225 XTRNL ECG REC<48 HRS REC: CPT

## 2021-04-29 PROCEDURE — 255N000002 HC RX 255 OP 636: Performed by: INTERNAL MEDICINE

## 2021-04-29 PROCEDURE — 93306 TTE W/DOPPLER COMPLETE: CPT | Mod: 26 | Performed by: INTERNAL MEDICINE

## 2021-04-29 PROCEDURE — 93227 XTRNL ECG REC<48 HR R&I: CPT | Performed by: INTERNAL MEDICINE

## 2021-04-29 PROCEDURE — 999N000208 ECHOCARDIOGRAM COMPLETE

## 2021-04-29 RX ADMIN — HUMAN ALBUMIN MICROSPHERES AND PERFLUTREN 3 ML: 10; .22 INJECTION, SOLUTION INTRAVENOUS at 09:00

## 2021-05-15 ENCOUNTER — HEALTH MAINTENANCE LETTER (OUTPATIENT)
Age: 61
End: 2021-05-15

## 2021-05-18 ENCOUNTER — OFFICE VISIT (OUTPATIENT)
Dept: CARDIOLOGY | Facility: CLINIC | Age: 61
End: 2021-05-18
Attending: INTERNAL MEDICINE
Payer: COMMERCIAL

## 2021-05-18 VITALS
DIASTOLIC BLOOD PRESSURE: 84 MMHG | BODY MASS INDEX: 39.2 KG/M2 | SYSTOLIC BLOOD PRESSURE: 136 MMHG | WEIGHT: 280 LBS | HEART RATE: 69 BPM | HEIGHT: 71 IN

## 2021-05-18 DIAGNOSIS — I48.0 PAROXYSMAL ATRIAL FIBRILLATION (H): Primary | ICD-10-CM

## 2021-05-18 PROCEDURE — 99214 OFFICE O/P EST MOD 30 MIN: CPT | Performed by: INTERNAL MEDICINE

## 2021-05-18 PROCEDURE — 93000 ELECTROCARDIOGRAM COMPLETE: CPT | Performed by: INTERNAL MEDICINE

## 2021-05-18 RX ORDER — FLECAINIDE ACETATE 150 MG/1
150 TABLET ORAL 2 TIMES DAILY
Qty: 180 TABLET | Refills: 3 | Status: SHIPPED | OUTPATIENT
Start: 2021-05-18 | End: 2021-10-05 | Stop reason: SINTOL

## 2021-05-18 ASSESSMENT — MIFFLIN-ST. JEOR: SCORE: 2097.2
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
## 2021-05-18 NOTE — PROGRESS NOTES
Service Date: 05/18/2021    SUBJECTIVE:  I saw Mr. Myles for followup of atrial fibrillation.  He is a 60-year-old white male who was incidentally found to be in atrial fibrillation with a rapid ventricular rate.  When I saw him on 03/08/2021 he was in persistent atrial fibrillation.  He was not having major symptoms at that time, but he was on diuretics for leg edema.  Subsequently, he had echocardiography that reported normal cardiac function.  He was put on a Holter monitor for assessment of rate control, but surprisingly, he was found to be in paroxysmal atrial fibrillation.  Overall, he has been doing reasonably well at the present time.  He has no palpitation, shortness of breath.  He is currently on Lasix 20 mg b.i.d.  He is currently using about 12 ounces of brittany on a regular basis.  He has retired.    OBJECTIVE:    GENERAL:  On examination, blood pressure was 136/84, heart rate 69 beats per minute, body weight 280 pounds.    HEENT:  The ENT were unremarkable.  LUNGS:  Clear.    HEART:  The cardiac rhythm was regular.  Heart sounds were normal, without murmur.  ABDOMEN:  Abdominal exam showed severe obesity.    EXTREMITIES:  There was no pedal edema.    EKG showed normal sinus rhythm.    ASSESSMENT AND RECOMMENDATIONS:  Mr. Myles has paroxysmal atrial fibrillation with no apparent symptoms, but the heart rate was mildly fast during atrial fibrillation.  He has been advised to gradually reduce the use of alcohol with a goal of no more than 2 ounces of brittany per day in the future.  For management of atrial fibrillation I have switched his low dose metoprolol to Flecainide 150 mg p.o. b.i.d. for prevention of recurrent atrial fibrillation.  He will have a Zio patch monitor for 2 weeks after initiation of flecainide for about 1 week.  I will see him for followup in about 1 month.  Because of his alcohol use I would not start him on anticoagulation at the present time.      cc:    Gonzalez Caputo  [facility] [address] [place], [address]    Eren Rg MD        D: 2021   T: 2021   MT: clarissa    Name:     EMILEE BERNABE  MRN:      [id number]        Account:      [id number]   :      1960           Service Date: 2021       Document: [id number]
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
## 2021-05-25 ENCOUNTER — HOSPITAL ENCOUNTER (OUTPATIENT)
Dept: CARDIOLOGY | Facility: CLINIC | Age: 61
Discharge: HOME | End: 2021-05-25
Attending: INTERNAL MEDICINE | Admitting: INTERNAL MEDICINE
Payer: COMMERCIAL

## 2021-05-25 DIAGNOSIS — I48.0 PAROXYSMAL ATRIAL FIBRILLATION (H): ICD-10-CM

## 2021-05-25 PROCEDURE — 93248 EXT ECG>7D<15D REV&INTERPJ: CPT | Performed by: INTERNAL MEDICINE

## 2021-05-25 PROCEDURE — 93246 EXT ECG>7D<15D RECORDING: CPT

## 2021-07-14 ENCOUNTER — OFFICE VISIT (OUTPATIENT)
Dept: CARDIOLOGY | Facility: CLINIC | Age: 61
End: 2021-07-14
Payer: COMMERCIAL

## 2021-07-14 VITALS
OXYGEN SATURATION: 99 % | WEIGHT: 288.1 LBS | HEIGHT: 71 IN | HEART RATE: 69 BPM | SYSTOLIC BLOOD PRESSURE: 146 MMHG | BODY MASS INDEX: 40.33 KG/M2 | DIASTOLIC BLOOD PRESSURE: 80 MMHG

## 2021-07-14 DIAGNOSIS — I48.0 PAROXYSMAL ATRIAL FIBRILLATION (H): ICD-10-CM

## 2021-07-14 PROCEDURE — 93000 ELECTROCARDIOGRAM COMPLETE: CPT | Performed by: INTERNAL MEDICINE

## 2021-07-14 PROCEDURE — 99213 OFFICE O/P EST LOW 20 MIN: CPT | Mod: 25 | Performed by: INTERNAL MEDICINE

## 2021-07-14 ASSESSMENT — MIFFLIN-ST. JEOR: SCORE: 2133.94
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
## 2021-07-14 NOTE — PROGRESS NOTES
Service Date: 2021    HISTORY OF PRESENT ILLNESS:  I saw Mr. Myles for followup of atrial fibrillation.  He is a 60-year-old white male who initially was found to be in persistent atrial fibrillation with rapid ventricular rate, and leg edema.  Subsequently, he was found to be in paroxysmal atrial fibrillation.  During the last clinic visit on  I started him on flecainide 150 mg p.o. b.i.d.  Subsequently, the ZIO Patch monitor showed no evidence of recurrent atrial fibrillation.  He has been doing well with no palpitation, shortness of breath or fatigue.  The patient has retired recently.  He stated that he has been sober from alcohol for the last week.  He does not smoke.  He expressed that he is under much less distress after assisted.    Overall, he is quite happy with the current status with no complaints.    PHYSICAL EXAMINATION:    VITAL SIGNS:  Blood pressure was 146/80, heart rate 69 beats per minute, body weight 288 pounds.  HEENT:  Were unremarkable.  LUNGS:  Clear.  CARDIAC:  Rhythm was regular and heart sounds were normal, without murmur.  ABDOMEN:  Severe obesity.  EXTREMITIES:  There was no pedal edema.    ASSESSMENT AND RECOMMENDATIONS:  Mr. Myles is doing well symptomatically.  He tolerates flecainide well with no evidence of recurrent atrial fibrillation.  The patient has ulcerative colitis and a history of alcohol abuse.  Since he is in sinus rhythm I do not recommend long-term anticoagulation at this point.  He is encouraged to lose weight and return for followup in 6 months.      cc:    Gonzalez Mancuso, OTR/L  [facility] [address] [place], [address]    Eren Rg MD        D: 2021   T: 2021   MT: clarissa    Name:     EMILEE MYLES  MRN:      [id number]        Account:      [id number]   :      1960           Service Date: 2021       Document: [id number]

## 2021-08-26 ENCOUNTER — TELEPHONE (OUTPATIENT)
Dept: CARDIOLOGY | Facility: CLINIC | Age: 61
End: 2021-08-26

## 2021-08-26 NOTE — TELEPHONE ENCOUNTER
8/26/21 Pt LM on Afib RN line stating he had a question about his Flecainide. Attempted to call pt, reached VM, LM and requested callback  KHRain 255 pm

## 2021-08-27 NOTE — TELEPHONE ENCOUNTER
8/27/21 Spoke w patient and explained recommendations per Dr Rg  The other option, instead of flecainide, would be amiodarone or AF ablation    He stated he would like to talk with his NP who he is awaiting a call from as we speak. He would like to stop Flecainide. Informed pt that if he just stops Flecainide the chance of returning Afib is likely and encouraged pt not to do so.  Offered OV w Dr Rg to discuss options more in detail  Pt states he will call back when he decides how to move forward.  Nuria 415 pm

## 2021-08-27 NOTE — TELEPHONE ENCOUNTER
Spoke to patient who reports his energy level since he started taking flecaindie has significantly decreased.  Does not have the strenght like he did back in April.  Also notes he is more SOB with going up the stairs.  Had medication reviewed with pharmacist at his pharmacy and they felt his symptoms are caused by flecainide.  Reports HR in the 50's.  Patient did have OV with Dr Rg on 7/14 however he did not discuss this with him.  Will have Dr Rg review above for recommendations.  INGA Mckenzie

## 2021-08-27 NOTE — TELEPHONE ENCOUNTER
8/27/21 Pt called back and would like to set up appt w Dr Rg to discuss options for tx of Afib as described below.  Scheduled for 8/31 at 2:45 pm  Pt voiced understanding and agreement with plan.   Nuria 430 pm

## 2021-09-04 ENCOUNTER — HEALTH MAINTENANCE LETTER (OUTPATIENT)
Age: 61
End: 2021-09-04

## 2021-10-05 ENCOUNTER — OFFICE VISIT (OUTPATIENT)
Dept: URGENT CARE | Facility: URGENT CARE | Age: 61
End: 2021-10-05
Payer: COMMERCIAL

## 2021-10-05 ENCOUNTER — ANCILLARY PROCEDURE (OUTPATIENT)
Dept: GENERAL RADIOLOGY | Facility: CLINIC | Age: 61
End: 2021-10-05
Attending: PHYSICIAN ASSISTANT
Payer: COMMERCIAL

## 2021-10-05 VITALS
RESPIRATION RATE: 20 BRPM | SYSTOLIC BLOOD PRESSURE: 129 MMHG | DIASTOLIC BLOOD PRESSURE: 77 MMHG | OXYGEN SATURATION: 98 % | HEART RATE: 87 BPM | TEMPERATURE: 98.2 F

## 2021-10-05 DIAGNOSIS — K51.919 ULCERATIVE COLITIS WITH COMPLICATION, UNSPECIFIED LOCATION (H): ICD-10-CM

## 2021-10-05 DIAGNOSIS — J01.90 ACUTE SINUSITIS WITH COEXISTING CONDITION REQUIRING PROPHYLACTIC TREATMENT: ICD-10-CM

## 2021-10-05 DIAGNOSIS — J20.9 ACUTE BRONCHITIS WITH COEXISTING CONDITION REQUIRING PROPHYLACTIC TREATMENT: Primary | ICD-10-CM

## 2021-10-05 DIAGNOSIS — I48.91 NEW ONSET ATRIAL FIBRILLATION (H): ICD-10-CM

## 2021-10-05 DIAGNOSIS — R05.9 COUGH: ICD-10-CM

## 2021-10-05 DIAGNOSIS — D64.9 ANEMIA, UNSPECIFIED TYPE: ICD-10-CM

## 2021-10-05 DIAGNOSIS — E11.69 TYPE 2 DIABETES MELLITUS WITH OTHER SPECIFIED COMPLICATION, WITHOUT LONG-TERM CURRENT USE OF INSULIN (H): ICD-10-CM

## 2021-10-05 LAB
BASOPHILS # BLD AUTO: 0 10E3/UL (ref 0–0.2)
BASOPHILS NFR BLD AUTO: 0 %
EOSINOPHIL # BLD AUTO: 0.2 10E3/UL (ref 0–0.7)
EOSINOPHIL NFR BLD AUTO: 2 %
ERYTHROCYTE [DISTWIDTH] IN BLOOD BY AUTOMATED COUNT: 15.7 % (ref 10–15)
HCT VFR BLD AUTO: 35.2 % (ref 40–53)
HGB BLD-MCNC: 11.3 G/DL (ref 13.3–17.7)
LYMPHOCYTES # BLD AUTO: 1.1 10E3/UL (ref 0.8–5.3)
LYMPHOCYTES NFR BLD AUTO: 14 %
MCH RBC QN AUTO: 31 PG (ref 26.5–33)
MCHC RBC AUTO-ENTMCNC: 32.1 G/DL (ref 31.5–36.5)
MCV RBC AUTO: 96 FL (ref 78–100)
MONOCYTES # BLD AUTO: 0.7 10E3/UL (ref 0–1.3)
MONOCYTES NFR BLD AUTO: 9 %
NEUTROPHILS # BLD AUTO: 5.9 10E3/UL (ref 1.6–8.3)
NEUTROPHILS NFR BLD AUTO: 74 %
PLATELET # BLD AUTO: 219 10E3/UL (ref 150–450)
RBC # BLD AUTO: 3.65 10E6/UL (ref 4.4–5.9)
WBC # BLD AUTO: 7.9 10E3/UL (ref 4–11)

## 2021-10-05 PROCEDURE — U0003 INFECTIOUS AGENT DETECTION BY NUCLEIC ACID (DNA OR RNA); SEVERE ACUTE RESPIRATORY SYNDROME CORONAVIRUS 2 (SARS-COV-2) (CORONAVIRUS DISEASE [COVID-19]), AMPLIFIED PROBE TECHNIQUE, MAKING USE OF HIGH THROUGHPUT TECHNOLOGIES AS DESCRIBED BY CMS-2020-01-R: HCPCS | Performed by: PHYSICIAN ASSISTANT

## 2021-10-05 PROCEDURE — 71046 X-RAY EXAM CHEST 2 VIEWS: CPT | Performed by: RADIOLOGY

## 2021-10-05 PROCEDURE — 36415 COLL VENOUS BLD VENIPUNCTURE: CPT | Performed by: PHYSICIAN ASSISTANT

## 2021-10-05 PROCEDURE — 85025 COMPLETE CBC W/AUTO DIFF WBC: CPT | Performed by: PHYSICIAN ASSISTANT

## 2021-10-05 PROCEDURE — U0005 INFEC AGEN DETEC AMPLI PROBE: HCPCS | Performed by: PHYSICIAN ASSISTANT

## 2021-10-05 PROCEDURE — 99203 OFFICE O/P NEW LOW 30 MIN: CPT | Performed by: PHYSICIAN ASSISTANT

## 2021-10-05 RX ORDER — DOXYCYCLINE 100 MG/1
100 CAPSULE ORAL 2 TIMES DAILY
Qty: 20 CAPSULE | Refills: 0 | Status: SHIPPED | OUTPATIENT
Start: 2021-10-05 | End: 2021-10-15

## 2021-10-05 NOTE — PATIENT INSTRUCTIONS
Patient Education     Sinusitis (Antibiotic Treatment)    The sinuses are air-filled spaces within the bones of the face. They connect to the inside of the nose. Sinusitis is an inflammation of the tissue that lines the sinuses. Sinusitis can occur during a cold. It can also happen due to allergies to pollens and other particles in the air. Sinusitis can cause symptoms of sinus congestion and a feeling of fullness. A sinus infection causes fever, headache, and facial pain. There is often green or yellow fluid draining from the nose or into the back of the throat (post-nasal drip). You have been given antibiotics to treat this condition.   Home care    Take the full course of antibiotics as instructed. Don't stop taking them, even when you feel better.    Drink plenty of water, hot tea, and other liquids as directed by the healthcare provider. This may help thin nasal mucus. It also may help your sinuses drain fluids.    Heat may help soothe painful areas of your face. Use a towel soaked in hot water. Or,  the shower and direct the warm spray onto your face. Using a vaporizer along with a menthol rub at night may also help soothe symptoms.     An expectorant with guaifenesin may help thin nasal mucus and help your sinuses drain fluids. Talk with your provider or pharmacists before taking an over-the-counter (OTC) medicine if you have any questions about it or its side effects..    You can use an OTC decongestant, unless a similar medicine was prescribed to you. Nasal sprays work the fastest. Use one that contains phenylephrine or oxymetazoline. First blow your nose gently. Then use the spray. Don't use these medicines more often than directed on the label. If you do, your symptoms may get worse. You may also take pills that contain pseudoephedrine. Don t use products that combine multiple medicines. This is because side effects may be increased. Read labels. You can also ask the pharmacist for help. (People  with high blood pressure should not use decongestants. They can raise blood pressure.) Talk with your provider or pharmacist if you have any questions about the medicine..    OTC antihistamines may help if allergies contributed to your sinusitis. Talk with your provider or pharmacist if you have any questions about the medicine..    Don't use nasal rinses or irrigation during an acute sinus infection, unless your healthcare provider tells you to. Rinsing may spread the infection to other areas in your sinuses.    Use acetaminophen or ibuprofen to control pain, unless another pain medicine was prescribed to you. If you have chronic liver or kidney disease or ever had a stomach ulcer, talk with your healthcare provider before using these medicines. Never give aspirin to anyone under age 18 who is ill with a fever. It may cause severe liver damage.    Don't smoke. This can make symptoms worse.    Follow-up care  Follow up with your healthcare provider, or as advised.   When to seek medical advice  Call your healthcare provider if any of these occur:     Facial pain or headache that gets worse    Stiff neck    Unusual drowsiness or confusion    Swelling of your forehead or eyelids    Symptoms don't go away in 10 days    Vision problems, such as blurred or double vision    Fever of 100.4 F (38 C) or higher, or as directed by your healthcare provider  Call 911  Call 911 if any of these occur:     Seizure    Trouble breathing    Feeling dizzy or faint    Fingernails, skin or lips look blue, purple , or gray  Prevention  Here are steps you can take to help prevent an infection:     Keep good hand washing habits.    Don t have close contact with people who have sore throats, colds, or other upper respiratory infections.    Don t smoke, and stay away from secondhand smoke.    Stay up to date with of your vaccines.  [employer] last reviewed this educational content on 12/1/2019 2000-2021 [employer]. All rights  reserved. This information is not intended as a substitute for professional medical care. Always follow your healthcare professional's instructions.               Patient Education     Bronchitis, Antibiotic Treatment (Adult)    Bronchitis is an infection of the air passages (bronchial tubes) in your lungs. It often occurs when you have a cold. This illness is contagious during the first few days and is spread through the air by coughing and sneezing, or by direct contact (touching the sick person and then touching your own eyes, nose, or mouth).  Symptoms of bronchitis include cough with mucus (phlegm) and low-grade fever. Bronchitis usually lasts 7 to 14 days. Mild cases can be treated with simple home remedies. More severe infection is treated with an antibiotic.  Home care  Follow these guidelines when caring for yourself at home:    If your symptoms are severe, rest at home for the first 2 to 3 days. When you go back to your usual activities, don't let yourself get too tired.    Don't smoke. Also stay away from secondhand smoke.    You may use over-the-counter medicines to control fever or pain, unless another medicine was prescribed. If you have chronic liver or kidney disease or have ever had a stomach ulcer or gastrointestinal bleeding, talk with your healthcare provider before using these medicines. Also talk to your provider if you are taking medicine to prevent blood clots. Aspirin should never be given to anyone younger than 18 who is ill with a viral infection or fever. It may cause severe liver or brain damage.    Your appetite may be low, so a light diet is fine. Stay well hydrated by drinking 6 to 8 glasses of fluids per day. This includes water, soft drinks, sports drinks, juices, tea, or soup. Extra fluids will help loosen mucus in your nose and lungs.    Over-the-counter cough, cold, and sore-throat medicines will not shorten the length of the illness, but they may be helpful to reduce your  symptoms. Don't use decongestants if you have high blood pressure.    Finish all antibiotic medicine. Do this even if you are feeling better after only a few days.  Follow-up care  Follow up with your healthcare provider, or as advised. If you had an X-ray or ECG (electrocardiogram), a specialist will review it. You will be told of any new test results that may affect your care.  If you are age 65 or older, if you smoke, or if you have a chronic lung disease or condition that affects your immune system, ask your healthcare provider about getting a pneumococcal vaccine and a yearly flu shot (influenza vaccine).  When to seek medical advice  Call your healthcare provider right away if any of these occur:    Fever of 100.4 F (38 C) or higher, or as directed by your healthcare provider    Coughing up more sputum    Weakness, drowsiness, headache, facial pain, ear pain, or a stiff neck  Call 911  Call 911 if any of these occur.    Coughing up blood    Weakness, drowsiness, headache, or stiff neck that get worse    Trouble breathing, wheezing, or pain with breathing  [employer] last reviewed this educational content on 6/1/2018 2000-2021 [employer]. All rights reserved. This information is not intended as a substitute for professional medical care. Always follow your healthcare professional's instructions.                 October 5, 2021 [facility]:     You have been diagnosed with bronchitis and sinusitis here today. Please start the antibiotic I prescribed for you now.      As we discussed, it is possible your symptoms could be caused by a common cold virus. It is also possible you could have a Covid-19 virus.     However, due to your other medical conditions that weaken your immune system, the fact that you have colored phlegm and the fact that your symptoms are slowly worsening (now day 8 of your illness), I suspect you have a bacterial component to your illness.     You report that you are  prone to pneumonia, so a chest x-ray and complete blood count were done here today. I do not see evidence of pneumonia at this time.      A Covid-19 PCR test wa done here today.  The result typically comes back in 1-2 days (watch your MyChart or call the urgent care clinic for your result in 1-2 days).      Please continue with home comfort care measures (including as needed Tylenol) and extra rest and fluids.  Please avoid use of Ibuprofen due to your heart history.      Please stay home/self-isolate (no contact with others outside of home) until your Covid-19 test result is back (this typically takes 1-2 days), you have no fever for 24 hours (without use of fever reducing medication) and your symptoms are improving.     I have also advised you make a follow-up appointment with your primary care proivder in the next 5-7 days to recheck your symptoms and to review your lab test results (you have some mild anemia noted today) and to review your chest x-ray (you have some mild heart enlargement and mild elevation of your right diaphragm on chest x-ray today).     Your primary care provider will let you know if you need further evaluation for these findings.     Report directly to the emergency room if you have any sudden, severe worsening of your symtpoms, or if you develop any of the below:       Coughing up blood    Chest pain    Irregular heart rate     Severe fatigue     Weakness, drowsiness, headache, or stiff neck that get worse    Trouble breathing, wheezing, or pain with breathing
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
## 2021-10-06 LAB — SARS-COV-2 RNA RESP QL NAA+PROBE: NEGATIVE

## 2021-10-08 ENCOUNTER — TELEPHONE (OUTPATIENT)
Dept: SLEEP MEDICINE | Facility: CLINIC | Age: 61
End: 2021-10-08

## 2021-10-08 NOTE — TELEPHONE ENCOUNTER
Reason for call:  Other   Patient called regarding (reason for call): appointment  Additional comments: Patient had a consultation with Dr. Brown on 9-24 and is requesting a callback to schedule his overnight study    Phone number to reach patient:  Cell number on file:    Telephone Information:   Mobile [phone]       Best Time:  any    Can we leave a detailed message on this number?  YES    Travel screening: Negative

## 2021-10-14 ENCOUNTER — TRANSFERRED RECORDS (OUTPATIENT)
Dept: HEALTH INFORMATION MANAGEMENT | Facility: CLINIC | Age: 61
End: 2021-10-14
Payer: COMMERCIAL

## 2021-10-14 ENCOUNTER — TELEPHONE (OUTPATIENT)
Dept: CARDIOLOGY | Facility: CLINIC | Age: 61
End: 2021-10-14

## 2021-10-14 DIAGNOSIS — I48.0 PAROXYSMAL ATRIAL FIBRILLATION (H): Primary | ICD-10-CM

## 2021-10-14 NOTE — TELEPHONE ENCOUNTER
Received message asking to please call patient as message received from patient's PCP today, 10/14/201, that they took an EKG and it showed that the patient was in Afib with HR's in the 140's. Unclear what was recommended to patient following this EKG.    Called patient and left a voicemail asking for a callback to discuss is symptomatic as well as to see if patient wanted to make follow-up appointment with Dr. Rg. [facility] Atrial fibrillation nurse phone number as well as scheduling phone numbers provided.

## 2021-10-15 RX ORDER — METOPROLOL TARTRATE 25 MG/1
25 TABLET, FILM COATED ORAL 2 TIMES DAILY
Qty: 180 TABLET | Refills: 3 | Status: SHIPPED | OUTPATIENT
Start: 2021-10-15 | End: 2021-12-29

## 2021-10-15 NOTE — TELEPHONE ENCOUNTER
"10/15/21Recd call back from pt . He stated he has been having episodes of Afib on a daily basis per his Fitbit . Episodes last from a few seconds to up to 15\" at a time. He is asymptomatic but was at PCP yesterday and they noted it during exam. Obtained EKG from [facility] . Will have Dr Bowers review and call pt back w recommendations.  Pt states current pulse rate is 60   Pt stopped Flecainide as of 8/26 because he did not feel well on it and had Hrs in the 50's. Pt is also not on anticoagulation. CHAds-VASC is 1 ( HTN)   KHerroRN 1245 pm   "

## 2021-10-15 NOTE — TELEPHONE ENCOUNTER
10/15/21 Verbal order recd from Dr Bowers after review of EKG recd from [facility]  -start Metoprolol Tartrate 25 mg BID  - start Eliquis 5 mg BID  Schedule follow up with Dr Rg  Attempted to call pt but reached PRAVEENA CARRINGTON and requested callback.   EKG sent to HIM for scanning  [facility] 3 pm

## 2021-10-15 NOTE — TELEPHONE ENCOUNTER
10/15/21 Recd msg from TERENCE Gonsales [facility] requesting information on whether call was made to pt yesterday after he was seen at [facility] and was noted to be in Afib w RVR.  Attempted to call pt, reached VM and LM requesting callback.   Attempted to call Ilda back at # she provided ( [phone]). LM informing her 2 attempts to reach pt have been unsuccessful. Requested EKG and OV note be faxed to Afib RN . Fax # and phone # provided  KHerroRN 915 am

## 2021-10-15 NOTE — TELEPHONE ENCOUNTER
10/15/21 Spoke w pt and explained recommendations. Scheduled pt to see Dr Rg on 11/16 at 945 am in [place]. Recommended pt monitor BP and P in the next few weeks  Pt voiced understanding and agreement with plan.   [place] 5 pm

## 2021-10-18 ENCOUNTER — TELEPHONE (OUTPATIENT)
Dept: CARDIOLOGY | Facility: CLINIC | Age: 61
End: 2021-10-18

## 2021-10-30 ENCOUNTER — HEALTH MAINTENANCE LETTER (OUTPATIENT)
Age: 61
End: 2021-10-30

## 2021-11-29 ENCOUNTER — TELEPHONE (OUTPATIENT)
Dept: SLEEP MEDICINE | Facility: CLINIC | Age: 61
End: 2021-11-29
Payer: COMMERCIAL

## 2021-12-29 ENCOUNTER — OFFICE VISIT (OUTPATIENT)
Dept: CARDIOLOGY | Facility: CLINIC | Age: 61
End: 2021-12-29
Payer: COMMERCIAL

## 2021-12-29 VITALS
HEART RATE: 55 BPM | HEIGHT: 71 IN | WEIGHT: 283 LBS | OXYGEN SATURATION: 98 % | SYSTOLIC BLOOD PRESSURE: 149 MMHG | DIASTOLIC BLOOD PRESSURE: 77 MMHG | BODY MASS INDEX: 39.62 KG/M2

## 2021-12-29 DIAGNOSIS — I48.0 PAROXYSMAL ATRIAL FIBRILLATION (H): ICD-10-CM

## 2021-12-29 DIAGNOSIS — I10 ESSENTIAL HYPERTENSION: Primary | ICD-10-CM

## 2021-12-29 PROBLEM — E66.01 MORBID OBESITY (H): Status: ACTIVE | Noted: 2021-12-29

## 2021-12-29 PROCEDURE — 99214 OFFICE O/P EST MOD 30 MIN: CPT | Performed by: INTERNAL MEDICINE

## 2021-12-29 PROCEDURE — 93000 ELECTROCARDIOGRAM COMPLETE: CPT | Performed by: INTERNAL MEDICINE

## 2021-12-29 RX ORDER — AMLODIPINE BESYLATE 10 MG/1
10 TABLET ORAL DAILY
Qty: 90 TABLET | Refills: 3 | Status: ON HOLD | OUTPATIENT
Start: 2021-12-29 | End: 2022-02-21

## 2021-12-29 RX ORDER — TORSEMIDE 20 MG/1
1 TABLET ORAL EVERY 24 HOURS
COMMUNITY
Start: 2021-07-13 | End: 2023-01-03

## 2021-12-29 ASSESSMENT — MIFFLIN-ST. JEOR: SCORE: 2105.81
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
## 2021-12-29 NOTE — PROGRESS NOTES
Service Date: 2021    HISTORY OF PRESENT ILLNESS:  I saw Mr. Myles for followup of atrial fibrillation.  He is a 61-year-old white male with a history of persistent atrial fibrillation with rapid ventricular rate on initial presentation.  He subsequently was found to have paroxysmal atrial fibrillation.  He was treated with flecainide 150 mg p.o. b.i.d. for prevention of recurrent atrial fibrillation.  The patient stopped flecainide after his last visit with me on 2021.  He stated the reason for discontinuation of flecainide was due to bradycardia.  So far, he has not felt palpitations, shortness of breath or dizziness.  He has been monitoring the heart rate and blood pressure regularly at home.  There has been no rapid heart rate.  He noticed a systolic blood pressure normally around 140s and 150s.    PHYSICAL EXAMINATION:  Blood pressure was 149/77, heart rate 55 beats per minute, body weight 283 pounds.  Cardiac rhythm was regular and heart sounds were normal with no murmur.  There was no pedal edema.    EKG showed sinus bradycardia.    ASSESSMENT AND RECOMMENDATIONS:  Mr. Myles has stopped flecainide.  It appears to me he has not had any apparent recurrence of atrial fibrillation.  He is asked to stay on anticoagulation for the time being.  If he has no apparent symptomatic atrial fibrillation, it is okay for him not to restart flecainide.  I have stopped his metoprolol because of sinus bradycardia.  I added amlodipine 10 mg p.o. daily for hypertension.  He is encouraged to lose weight.  He is scheduled for Cardiology followup in a year  if he feels well.    Eren Rg MD    cc:  Gonzalez aMncuso MD  [facility] [address] [place]  [address]    Eren Rg MD        D: 2021   T: 2021   MT: az    Name:     EMIELE MYLES  MRN:      [id number]        Account:      [id number]   :      1960           Service Date: 2021       Document:  [id number]

## 2022-02-18 ENCOUNTER — HOSPITAL ENCOUNTER (INPATIENT)
Facility: CLINIC | Age: 62
LOS: 3 days | Discharge: HOME | End: 2022-02-21
Attending: EMERGENCY MEDICINE | Admitting: INTERNAL MEDICINE
Payer: COMMERCIAL

## 2022-02-18 ENCOUNTER — APPOINTMENT (OUTPATIENT)
Dept: CT IMAGING | Facility: CLINIC | Age: 62
End: 2022-02-18
Attending: EMERGENCY MEDICINE
Payer: COMMERCIAL

## 2022-02-18 DIAGNOSIS — K92.2 GASTROINTESTINAL HEMORRHAGE, UNSPECIFIED GASTROINTESTINAL HEMORRHAGE TYPE: ICD-10-CM

## 2022-02-18 DIAGNOSIS — D64.9 ANEMIA, UNSPECIFIED TYPE: ICD-10-CM

## 2022-02-18 DIAGNOSIS — K92.1 MELENA: ICD-10-CM

## 2022-02-18 DIAGNOSIS — K92.2 ACUTE UPPER GI BLEEDING: Primary | ICD-10-CM

## 2022-02-18 DIAGNOSIS — R52 PAIN: ICD-10-CM

## 2022-02-18 DIAGNOSIS — I48.0 PAROXYSMAL ATRIAL FIBRILLATION (H): ICD-10-CM

## 2022-02-18 LAB
ABO/RH(D): NORMAL
ALBUMIN SERPL-MCNC: 3 G/DL (ref 3.4–5)
ALP SERPL-CCNC: 67 U/L (ref 40–150)
ALT SERPL W P-5'-P-CCNC: 12 U/L (ref 0–70)
ANION GAP SERPL CALCULATED.3IONS-SCNC: 9 MMOL/L (ref 3–14)
ANTIBODY SCREEN: NEGATIVE
AST SERPL W P-5'-P-CCNC: 14 U/L (ref 0–45)
BASOPHILS # BLD AUTO: 0 10E3/UL (ref 0–0.2)
BASOPHILS NFR BLD AUTO: 1 %
BILIRUB SERPL-MCNC: 0.3 MG/DL (ref 0.2–1.3)
BLD PROD TYP BPU: NORMAL
BLD PROD TYP BPU: NORMAL
BLOOD COMPONENT TYPE: NORMAL
BLOOD COMPONENT TYPE: NORMAL
BUN SERPL-MCNC: 34 MG/DL (ref 7–30)
CALCIUM SERPL-MCNC: 8.7 MG/DL (ref 8.5–10.1)
CHLORIDE BLD-SCNC: 101 MMOL/L (ref 94–109)
CO2 SERPL-SCNC: 24 MMOL/L (ref 20–32)
CODING SYSTEM: NORMAL
CODING SYSTEM: NORMAL
CREAT SERPL-MCNC: 1.26 MG/DL (ref 0.66–1.25)
CROSSMATCH: NORMAL
CROSSMATCH: NORMAL
EOSINOPHIL # BLD AUTO: 0.2 10E3/UL (ref 0–0.7)
EOSINOPHIL NFR BLD AUTO: 3 %
ERYTHROCYTE [DISTWIDTH] IN BLOOD BY AUTOMATED COUNT: 15.1 % (ref 10–15)
ETHANOL SERPL-MCNC: <0.01 G/DL
GFR SERPL CREATININE-BSD FRML MDRD: 64 ML/MIN/1.73M2
GLUCOSE BLD-MCNC: 155 MG/DL (ref 70–99)
GLUCOSE BLDC GLUCOMTR-MCNC: 166 MG/DL (ref 70–99)
HCT VFR BLD AUTO: 18.5 % (ref 40–53)
HEMOCCULT STL QL: POSITIVE
HGB BLD-MCNC: 5.7 G/DL (ref 13.3–17.7)
IMM GRANULOCYTES # BLD: 0.1 10E3/UL
IMM GRANULOCYTES NFR BLD: 1 %
INR PPP: 1.17 (ref 0.85–1.15)
ISSUE DATE AND TIME: NORMAL
ISSUE DATE AND TIME: NORMAL
LACTATE SERPL-SCNC: 0.9 MMOL/L (ref 0.7–2)
LACTATE SERPL-SCNC: 3.7 MMOL/L (ref 0.7–2)
LIPASE SERPL-CCNC: 193 U/L (ref 73–393)
LYMPHOCYTES # BLD AUTO: 1.1 10E3/UL (ref 0.8–5.3)
LYMPHOCYTES NFR BLD AUTO: 15 %
MCH RBC QN AUTO: 29.8 PG (ref 26.5–33)
MCHC RBC AUTO-ENTMCNC: 30.8 G/DL (ref 31.5–36.5)
MCV RBC AUTO: 97 FL (ref 78–100)
MONOCYTES # BLD AUTO: 0.5 10E3/UL (ref 0–1.3)
MONOCYTES NFR BLD AUTO: 6 %
NEUTROPHILS # BLD AUTO: 5.9 10E3/UL (ref 1.6–8.3)
NEUTROPHILS NFR BLD AUTO: 74 %
NRBC # BLD AUTO: 0 10E3/UL
NRBC BLD AUTO-RTO: 0 /100
PLATELET # BLD AUTO: 257 10E3/UL (ref 150–450)
POTASSIUM BLD-SCNC: 4.4 MMOL/L (ref 3.4–5.3)
PROT SERPL-MCNC: 6.5 G/DL (ref 6.8–8.8)
RBC # BLD AUTO: 1.91 10E6/UL (ref 4.4–5.9)
SARS-COV-2 RNA RESP QL NAA+PROBE: NEGATIVE
SODIUM SERPL-SCNC: 134 MMOL/L (ref 133–144)
SPECIMEN EXPIRATION DATE: NORMAL
TROPONIN I SERPL HS-MCNC: 9 NG/L
UNIT ABO/RH: NORMAL
UNIT ABO/RH: NORMAL
UNIT NUMBER: NORMAL
UNIT NUMBER: NORMAL
UNIT STATUS: NORMAL
UNIT STATUS: NORMAL
UNIT TYPE ISBT: 5100
UNIT TYPE ISBT: 5100
WBC # BLD AUTO: 7.8 10E3/UL (ref 4–11)

## 2022-02-18 PROCEDURE — 96365 THER/PROPH/DIAG IV INF INIT: CPT | Mod: 59

## 2022-02-18 PROCEDURE — C9113 INJ PANTOPRAZOLE SODIUM, VIA: HCPCS | Performed by: EMERGENCY MEDICINE

## 2022-02-18 PROCEDURE — 96375 TX/PRO/DX INJ NEW DRUG ADDON: CPT

## 2022-02-18 PROCEDURE — 250N000011 HC RX IP 250 OP 636: Performed by: EMERGENCY MEDICINE

## 2022-02-18 PROCEDURE — 85610 PROTHROMBIN TIME: CPT | Performed by: EMERGENCY MEDICINE

## 2022-02-18 PROCEDURE — 86901 BLOOD TYPING SEROLOGIC RH(D): CPT | Performed by: EMERGENCY MEDICINE

## 2022-02-18 PROCEDURE — 74177 CT ABD & PELVIS W/CONTRAST: CPT

## 2022-02-18 PROCEDURE — 85025 COMPLETE CBC W/AUTO DIFF WBC: CPT | Performed by: EMERGENCY MEDICINE

## 2022-02-18 PROCEDURE — 999N000157 HC STATISTIC RCP TIME EA 10 MIN

## 2022-02-18 PROCEDURE — 83605 ASSAY OF LACTIC ACID: CPT | Performed by: EMERGENCY MEDICINE

## 2022-02-18 PROCEDURE — 250N000009 HC RX 250: Performed by: EMERGENCY MEDICINE

## 2022-02-18 PROCEDURE — 99223 1ST HOSP IP/OBS HIGH 75: CPT | Mod: AI | Performed by: INTERNAL MEDICINE

## 2022-02-18 PROCEDURE — 87635 SARS-COV-2 COVID-19 AMP PRB: CPT | Performed by: EMERGENCY MEDICINE

## 2022-02-18 PROCEDURE — 120N000001 HC R&B MED SURG/OB

## 2022-02-18 PROCEDURE — 86923 COMPATIBILITY TEST ELECTRIC: CPT | Performed by: EMERGENCY MEDICINE

## 2022-02-18 PROCEDURE — 82272 OCCULT BLD FECES 1-3 TESTS: CPT | Performed by: EMERGENCY MEDICINE

## 2022-02-18 PROCEDURE — 84484 ASSAY OF TROPONIN QUANT: CPT | Performed by: EMERGENCY MEDICINE

## 2022-02-18 PROCEDURE — 83690 ASSAY OF LIPASE: CPT | Performed by: EMERGENCY MEDICINE

## 2022-02-18 PROCEDURE — C9803 HOPD COVID-19 SPEC COLLECT: HCPCS

## 2022-02-18 PROCEDURE — 86923 COMPATIBILITY TEST ELECTRIC: CPT | Performed by: INTERNAL MEDICINE

## 2022-02-18 PROCEDURE — 36415 COLL VENOUS BLD VENIPUNCTURE: CPT | Performed by: EMERGENCY MEDICINE

## 2022-02-18 PROCEDURE — 99285 EMERGENCY DEPT VISIT HI MDM: CPT | Mod: 25

## 2022-02-18 PROCEDURE — 258N000003 HC RX IP 258 OP 636: Performed by: EMERGENCY MEDICINE

## 2022-02-18 PROCEDURE — 80053 COMPREHEN METABOLIC PANEL: CPT | Performed by: EMERGENCY MEDICINE

## 2022-02-18 PROCEDURE — P9016 RBC LEUKOCYTES REDUCED: HCPCS | Performed by: EMERGENCY MEDICINE

## 2022-02-18 PROCEDURE — 82077 ASSAY SPEC XCP UR&BREATH IA: CPT | Performed by: EMERGENCY MEDICINE

## 2022-02-18 PROCEDURE — 94660 CPAP INITIATION&MGMT: CPT

## 2022-02-18 PROCEDURE — 36430 TRANSFUSION BLD/BLD COMPNT: CPT

## 2022-02-18 PROCEDURE — 96361 HYDRATE IV INFUSION ADD-ON: CPT

## 2022-02-18 PROCEDURE — 93005 ELECTROCARDIOGRAM TRACING: CPT

## 2022-02-18 PROCEDURE — 87040 BLOOD CULTURE FOR BACTERIA: CPT | Performed by: EMERGENCY MEDICINE

## 2022-02-18 RX ORDER — NITROGLYCERIN 0.4 MG/1
0.4 TABLET SUBLINGUAL EVERY 5 MIN PRN
Status: DISCONTINUED | OUTPATIENT
Start: 2022-02-18 | End: 2022-02-21 | Stop reason: HOSPADM

## 2022-02-18 RX ORDER — CEFTRIAXONE 2 G/1
2 INJECTION, POWDER, FOR SOLUTION INTRAMUSCULAR; INTRAVENOUS EVERY EVENING
Status: DISCONTINUED | OUTPATIENT
Start: 2022-02-18 | End: 2022-02-19

## 2022-02-18 RX ORDER — PROCHLORPERAZINE 25 MG
25 SUPPOSITORY, RECTAL RECTAL EVERY 12 HOURS PRN
Status: DISCONTINUED | OUTPATIENT
Start: 2022-02-18 | End: 2022-02-21 | Stop reason: HOSPADM

## 2022-02-18 RX ORDER — OMEGA-3 FATTY ACIDS/FISH OIL 300-1000MG
800 CAPSULE ORAL 2 TIMES DAILY PRN
Status: ON HOLD | COMMUNITY
End: 2022-02-21

## 2022-02-18 RX ORDER — ONDANSETRON 4 MG/1
4 TABLET, ORALLY DISINTEGRATING ORAL EVERY 6 HOURS PRN
Status: DISCONTINUED | OUTPATIENT
Start: 2022-02-18 | End: 2022-02-21 | Stop reason: HOSPADM

## 2022-02-18 RX ORDER — IOPAMIDOL 755 MG/ML
500 INJECTION, SOLUTION INTRAVASCULAR ONCE
Status: COMPLETED | OUTPATIENT
Start: 2022-02-18 | End: 2022-02-18

## 2022-02-18 RX ORDER — LIDOCAINE 40 MG/G
CREAM TOPICAL
Status: DISCONTINUED | OUTPATIENT
Start: 2022-02-18 | End: 2022-02-18

## 2022-02-18 RX ORDER — LIDOCAINE 40 MG/G
CREAM TOPICAL
Status: DISCONTINUED | OUTPATIENT
Start: 2022-02-18 | End: 2022-02-21 | Stop reason: HOSPADM

## 2022-02-18 RX ORDER — SODIUM CHLORIDE 9 MG/ML
INJECTION, SOLUTION INTRAVENOUS CONTINUOUS
Status: DISCONTINUED | OUTPATIENT
Start: 2022-02-18 | End: 2022-02-18

## 2022-02-18 RX ORDER — ONDANSETRON 2 MG/ML
4 INJECTION INTRAMUSCULAR; INTRAVENOUS EVERY 6 HOURS PRN
Status: DISCONTINUED | OUTPATIENT
Start: 2022-02-18 | End: 2022-02-21 | Stop reason: HOSPADM

## 2022-02-18 RX ORDER — NALOXONE HYDROCHLORIDE 0.4 MG/ML
0.4 INJECTION, SOLUTION INTRAMUSCULAR; INTRAVENOUS; SUBCUTANEOUS
Status: DISCONTINUED | OUTPATIENT
Start: 2022-02-18 | End: 2022-02-21 | Stop reason: HOSPADM

## 2022-02-18 RX ORDER — SODIUM CHLORIDE 9 MG/ML
INJECTION, SOLUTION INTRAVENOUS CONTINUOUS
Status: DISCONTINUED | OUTPATIENT
Start: 2022-02-18 | End: 2022-02-21

## 2022-02-18 RX ORDER — BALSALAZIDE DISODIUM 750 MG/1
2250 CAPSULE ORAL 2 TIMES DAILY
COMMUNITY

## 2022-02-18 RX ORDER — ACETAMINOPHEN 650 MG/1
650 SUPPOSITORY RECTAL EVERY 6 HOURS PRN
Status: DISCONTINUED | OUTPATIENT
Start: 2022-02-18 | End: 2022-02-21 | Stop reason: HOSPADM

## 2022-02-18 RX ORDER — HYDROMORPHONE HCL IN WATER/PF 6 MG/30 ML
0.2 PATIENT CONTROLLED ANALGESIA SYRINGE INTRAVENOUS
Status: COMPLETED | OUTPATIENT
Start: 2022-02-18 | End: 2022-02-18

## 2022-02-18 RX ORDER — HYDROMORPHONE HCL IN WATER/PF 6 MG/30 ML
0.2 PATIENT CONTROLLED ANALGESIA SYRINGE INTRAVENOUS
Status: DISCONTINUED | OUTPATIENT
Start: 2022-02-18 | End: 2022-02-21 | Stop reason: HOSPADM

## 2022-02-18 RX ORDER — ACETAMINOPHEN 325 MG/1
650 TABLET ORAL EVERY 6 HOURS PRN
Status: DISCONTINUED | OUTPATIENT
Start: 2022-02-18 | End: 2022-02-21 | Stop reason: HOSPADM

## 2022-02-18 RX ORDER — NALOXONE HYDROCHLORIDE 0.4 MG/ML
0.2 INJECTION, SOLUTION INTRAMUSCULAR; INTRAVENOUS; SUBCUTANEOUS
Status: DISCONTINUED | OUTPATIENT
Start: 2022-02-18 | End: 2022-02-21 | Stop reason: HOSPADM

## 2022-02-18 RX ORDER — NICOTINE POLACRILEX 4 MG
15-30 LOZENGE BUCCAL
Status: DISCONTINUED | OUTPATIENT
Start: 2022-02-18 | End: 2022-02-21 | Stop reason: HOSPADM

## 2022-02-18 RX ORDER — DEXTROSE MONOHYDRATE 25 G/50ML
25-50 INJECTION, SOLUTION INTRAVENOUS
Status: DISCONTINUED | OUTPATIENT
Start: 2022-02-18 | End: 2022-02-21 | Stop reason: HOSPADM

## 2022-02-18 RX ORDER — PROCHLORPERAZINE MALEATE 10 MG
10 TABLET ORAL EVERY 6 HOURS PRN
Status: DISCONTINUED | OUTPATIENT
Start: 2022-02-18 | End: 2022-02-21 | Stop reason: HOSPADM

## 2022-02-18 RX ADMIN — SODIUM CHLORIDE 1000 ML: 9 INJECTION, SOLUTION INTRAVENOUS at 15:49

## 2022-02-18 RX ADMIN — HYDROMORPHONE HYDROCHLORIDE 0.2 MG: 0.2 INJECTION, SOLUTION INTRAMUSCULAR; INTRAVENOUS; SUBCUTANEOUS at 21:09

## 2022-02-18 RX ADMIN — IOPAMIDOL 80 ML: 755 INJECTION, SOLUTION INTRAVENOUS at 16:25

## 2022-02-18 RX ADMIN — SODIUM CHLORIDE 39 ML: 9 INJECTION, SOLUTION INTRAVENOUS at 16:25

## 2022-02-18 RX ADMIN — METRONIDAZOLE 500 MG: 500 INJECTION, SOLUTION INTRAVENOUS at 16:46

## 2022-02-18 RX ADMIN — PANTOPRAZOLE SODIUM 40 MG: 40 INJECTION, POWDER, FOR SOLUTION INTRAVENOUS at 16:46

## 2022-02-18 RX ADMIN — CEFEPIME HYDROCHLORIDE 2 G: 2 INJECTION, POWDER, FOR SOLUTION INTRAVENOUS at 16:46

## 2022-02-18 ASSESSMENT — ACTIVITIES OF DAILY LIVING (ADL)
FALL_HISTORY_WITHIN_LAST_SIX_MONTHS: NO
ADLS_ACUITY_SCORE: 12
TOILETING_ISSUES: NO
ADLS_ACUITY_SCORE: 12
WALKING_OR_CLIMBING_STAIRS_DIFFICULTY: NO
ADLS_ACUITY_SCORE: 12
DOING_ERRANDS_INDEPENDENTLY_DIFFICULTY: NO
WEAR_GLASSES_OR_BLIND: NO
ADLS_ACUITY_SCORE: 12
CONCENTRATING,_REMEMBERING_OR_MAKING_DECISIONS_DIFFICULTY: NO
DIFFICULTY_EATING/SWALLOWING: NO
ADLS_ACUITY_SCORE: 12
ADLS_ACUITY_SCORE: 12
DRESSING/BATHING_DIFFICULTY: NO

## 2022-02-18 NOTE — ED PROVIDER NOTES
History     Chief Complaint:  Melena and Hypotension     The history is provided by the patient.      Fer Myles is a 61 year old male on Eliquis with history of atrial fibrillation, hypertension, type II diabetes mellitus, subdural hematoma, alcohol abuse who presents with 4 days of black stools, lightheadedness, weakness with associated low blood pressure readings at home. With any movement, his symptoms worsen, and he has some shortness of breath as well. His stools have been loose, dark but he has not noticed any bright red blood. He reports that today, he had a syncopal episode while he was folding laundry. He states that he fell onto his bed and did not hit his head. When he regained consciousness, he reports that he was shaking. He took his blood pressure shortly after this and it was 85/38. No chest pain or shortness of breath prior to the syncopal event. Fer also mentions some diffuse back pain which began 4 days ago. This improves slightly with use of ibuprofen. He has been taking 800 mg of ibuprofen 4 times per day. He also notes that he has been having difficulty sleeping. He sleeps laying supine. Matias denies cough, fever, chills, unilateral numbness, tingling, or weakness, chest pain, hematemesis. He does note that he has a history of peptic ulcer bleed which required operation. He denies current alcohol use and states that he has not had a drink for approximately one month. He has been taking his medications for ulcerative colitis as prescribed.    Review of Systems   All other systems reviewed and are negative.    Allergies:  Amoxicillin    Medications:  Actos  Amlodipine  Eliquis  Balsalazide  Lisinopril  Torsemide  Zoloft    Past Medical History:     Alcohol abuse  Cataracts  Depression  GERD  Gout  Hypertension  HAKEEM  Paroxysmal atrial fibrillation  Subdural hematoma  Type II diabetes mellitus   Ulcerative colitis  Obesity       Past Surgical History:    Gastric bypass  "  EGD  Colonoscopy  [place] teeth extraction  Hand/finger procedure   Leg/ankle surgery  Subdural hematoma surgery    Social History:  The patient presents to the ED alone.  The patient presents to the ED via car.    Physical Exam     Patient Vitals for the past 24 hrs:   BP Temp Temp src Pulse Resp SpO2 Height Weight   02/18/22 2211 119/67 98.8  F (37.1  C) Oral 78 20 100 % -- --   02/18/22 2202 -- -- -- -- -- -- (P) 1.803 m (5' 11\") (P) 130 kg (286 lb 9.6 oz)   02/18/22 2145 101/58 98.8  F (37.1  C) -- 80 -- 99 % -- --   02/18/22 2130 109/70 -- -- 78 -- 98 % -- --   02/18/22 2115 122/62 -- -- 79 -- 99 % -- --   02/18/22 2100 122/80 98.8  F (37.1  C) Oral 78 -- 98 % -- --   02/18/22 2045 103/67 -- -- 78 -- 100 % -- --   02/18/22 2030 99/60 -- -- 76 -- 99 % -- --   02/18/22 2015 99/61 -- -- 80 -- 100 % -- --   02/18/22 2000 -- -- -- -- -- 100 % -- --   02/18/22 1945 104/63 -- -- 78 -- 100 % -- --   02/18/22 1930 92/41 -- -- 82 -- 100 % -- --   02/18/22 1915 (!) 87/43 -- -- 82 -- 95 % -- --   02/18/22 1900 91/45 -- -- 81 -- 100 % -- --   02/18/22 1855 -- 98.3  F (36.8  C) Oral -- -- 99 % -- --   02/18/22 1845 90/40 -- -- 83 -- 98 % -- --   02/18/22 1843 -- 98  F (36.7  C) Oral -- -- 99 % -- --   02/18/22 1832 133/58 -- -- 88 -- 100 % -- --   02/18/22 1827 100/52 98.1  F (36.7  C) Oral 83 -- 100 % -- --   02/18/22 1815 100/52 -- -- 83 -- 100 % -- --   02/18/22 1745 105/59 -- -- 91 -- 100 % -- --   02/18/22 1730 99/62 -- -- 79 -- 98 % -- --   02/18/22 1715 108/64 -- -- 86 -- 100 % -- --   02/18/22 1700 95/64 -- -- -- -- -- -- --   02/18/22 1600 103/58 -- -- 75 -- 100 % -- --   02/18/22 1530 98/66 -- -- 73 -- 100 % -- --   02/18/22 1510 109/49 97.4  F (36.3  C) Temporal 92 18 98 % 1.803 m (5' 11\") 132.4 kg (291 lb 14.2 oz)     Physical Exam  General: Resting on the bed.  Head: No obvious trauma to head.  Ears, Nose, Throat:  External ears normal.  Nose normal.    Eyes:  Conjunctivae clear.    CV: Regular rate and rhythm. "     Respiratory: Effort normal and breath sounds normal.  No wheezing or crackles.   Gastrointestinal: Soft.  No distension. There is mild diffuse tenderness.  There is no rigidity, no rebound and no guarding.   Musculoskeletal: chronic bilateral lower extremity edema   Neuro: Alert. Moving all extremities appropriately.  Normal speech.    Skin: Skin is warm and dry.  No rash noted.   Rectal: no gross melena on exam but dark stool noted     Emergency Department Course     ECG:  ECG taken at 1618, ECG read at 1622  Sinus rhythm with premature supraventricular complexes  Otherwise normal ECG  Now in sinus rhythm as compared to prior, dated 3/4/21.  Rate 77 bpm. MS interval 166 ms. QRS duration 88 ms. QT/QTc 402/454 ms. P-R-T axes 54 10 29.     Imaging:  CT Aortic Survey w Contrast   Final Result   IMPRESSION:    1. Although the thoracoabdominal aorta is suboptimally opacified,   there is no evidence of thoracoabdominal aortic aneurysm or   dissection.   2. No acute pathology in the chest, abdomen and pelvis.   3. Moderate atherosclerotic vascular calcification of the coronary   arteries.   4. Few scattered pulmonary nodules including 7 mm nodule in the   lingula, as per Fleischner's society criteria, recommend follow-up   exam in 6-12 month radiographic a chest CT assess for interval   stability   5. Multiple enlarged inguinal lymph nodes including 1.9 cm short axis   right inguinal node, indeterminant, could be reactive or neoplastic.      LETICIA SOUZA MD            SYSTEM ID:  [id number]      XR Chest 2 Views    (Results Pending)   Report per radiology    Laboratory:  Labs Ordered and Resulted from Time of ED Arrival to Time of ED Departure   INR - Abnormal       Result Value    INR 1.17 (*)    COMPREHENSIVE METABOLIC PANEL - Abnormal    Sodium 134      Potassium 4.4      Chloride 101      Carbon Dioxide (CO2) 24      Anion Gap 9      Urea Nitrogen 34 (*)     Creatinine 1.26 (*)     Calcium 8.7      Glucose  155 (*)     Alkaline Phosphatase 67      AST 14      ALT 12      Protein Total 6.5 (*)     Albumin 3.0 (*)     Bilirubin Total 0.3      GFR Estimate 64     LACTIC ACID WHOLE BLOOD - Abnormal    Lactic Acid 3.7 (*)    OCCULT BLOOD STOOL - Abnormal    Occult Blood Positive (*)    CBC WITH PLATELETS AND DIFFERENTIAL - Abnormal    WBC Count 7.8      RBC Count 1.91 (*)     Hemoglobin 5.7 (*)     Hematocrit 18.5 (*)     MCV 97      MCH 29.8      MCHC 30.8 (*)     RDW 15.1 (*)     Platelet Count 257      % Neutrophils 74      % Lymphocytes 15      % Monocytes 6      % Eosinophils 3      % Basophils 1      % Immature Granulocytes 1      NRBCs per 100 WBC 0      Absolute Neutrophils 5.9      Absolute Lymphocytes 1.1      Absolute Monocytes 0.5      Absolute Eosinophils 0.2      Absolute Basophils 0.0      Absolute Immature Granulocytes 0.1      Absolute NRBCs 0.0     LIPASE - Normal    Lipase 193     ETHYL ALCOHOL LEVEL - Normal    Alcohol ethyl <0.01     TROPONIN I - Normal    Troponin I High Sensitivity 9     LACTIC ACID WHOLE BLOOD - Normal    Lactic Acid 0.9     COVID-19 VIRUS (CORONAVIRUS) BY PCR - Normal    SARS CoV2 PCR Negative     TYPE AND SCREEN, ADULT    ABO/RH(D) O POS      Antibody Screen Negative      SPECIMEN EXPIRATION DATE 20220221235900     PREPARE RED BLOOD CELLS (UNIT)    CROSSMATCH Compatible      UNIT ABO/RH O Pos      Unit Number [id number]      Unit Status Ready      Blood Component Type Red Blood Cells      Product Code I9180Q08      CODING SYSTEM [id number]      UNIT TYPE ISBT 5100     PREPARE RED BLOOD CELLS (UNIT)    CROSSMATCH Compatible      UNIT ABO/RH O Pos      Unit Number [id number]      Unit Status Issued      Blood Component Type Red Blood Cells      Product Code L9322K39      CODING SYSTEM [id number]      UNIT TYPE ISBT 5100      ISSUE DATE AND TIME [id number]     PREPARE RED BLOOD CELLS (UNIT)   BLOOD CULTURE   BLOOD CULTURE   TRANSFUSE RED BLOOD CELLS (UNIT)   ABO/RH TYPE AND SCREEN       Emergency Department Course:       Reviewed:  I reviewed nursing notes, vitals, past medical history, Care Everywhere    Assessments:  1558 I obtained history and examined the patient as noted above.   1655 I rechecked the patient and explained findings.     Consults:  1738 I spoke with Dr. Llamas from [facility] regarding the patient's presentation and plan of care.   1747 I spoke with Dr. Dillard from the hospitalist service regarding the patient's presentation, findings here in the ED, and plan of care.    Interventions:  1549 NS 1 L IV  1646 Cefepime 2 g IV  1646 Flagyl 500 mg IV  1646 Protonix 40 mg IV  2109 Dilaudid 0.2 mg IV  Red blood cells 2 units IV    Disposition:  The patient was admitted to the hospital under the care of Dr. Dillard.     Impression & Plan     CMS Diagnoses: The Lactic acid level is elevated due to GI bleed, at this time there is no sign of severe sepsis or septic shock.    Medical Decision Making:  Fer Myles is a 61 year old male who presents to the emergency department for evaluation of weakness and melena.  Vital signs showing softer blood pressures but largely stable.  Broad differential was pursued include not limited to upper versus lower GI bleed, dissection, ACS, arrhythmia, PE, anemia, electrolyte, metabolic, renal dysfunction, infection, etc.  CBC without leukocytosis but significant anemia of 5.7.  Occult was positive.  Patient has history of peptic ulcers and Protonix was administered.  Initial lactate is elevated, this cleared with fluids and blood products.  No fever, normal white count, no signs of severe sepsis or septic shock.  BMP with mild elevation in creatinine and BUN but otherwise unremarkable.  No acute electrolyte or metabolic abnormalities otherwise noted.  Alcohol level negative.  Patient reports being sober for the last month.  Covid swab negative.  EKG showing sinus rhythm without acute ischemic changes.  Troponin negative.  Patient most likely week  from ongoing GI bleed.  Suspect upper given melena.  Spoke with GI they recommended Protonix twice daily and holding patient's blood thinner.  Patient was given 2 units of blood in the ER.  Patient was admitted to the floor.  Vital signs remained stable.  Hospitalist graciously accepted.    Diagnosis:    ICD-10-CM    1. Gastrointestinal hemorrhage, unspecified gastrointestinal hemorrhage type  K92.2    2. Melena  K92.1    3. Anemia, unspecified type  D64.9      Scribe Disclosure:  I, Kourtney Kuhn, am serving as a scribe at 3:34 PM on 2/18/2022 to document services personally performed by Wen Luz MD based on my observations and the provider's statements to me.        Wen Luz MD  02/18/22 3387

## 2022-02-18 NOTE — ED NOTES
[facility]  ED Nurse Handoff Report    Fer Myles is a 61 year old male   ED Chief complaint: Melena and Hypotension  . ED Diagnosis:   Final diagnoses:   Gastrointestinal hemorrhage, unspecified gastrointestinal hemorrhage type   Melena   Anemia, unspecified type     Allergies:   Allergies   Allergen Reactions     Amoxicillin Rash       Code Status: Full Code  Activity level - Baseline/Home:  Independent. Activity Level - Current:   Assist X 1. Lift room needed: No. Bariatric: Yes   Needed: No   Isolation: No. Infection: Not Applicable.     Vital Signs:   Vitals:    02/18/22 1815 02/18/22 1827 02/18/22 1832 02/18/22 1843   BP: 100/52 100/52 133/58    Pulse: 83 83 88    Resp:       Temp:  98.1  F (36.7  C)  98  F (36.7  C)   TempSrc:  Oral  Oral   SpO2: 100% 100% 100% 99%   Weight:       Height:           Cardiac Rhythm:  ,      Pain level:    Patient confused: No. Patient Falls Risk: Yes.   Elimination Status: Has voided   Patient Report - Initial Complaint: melena, general weakness. Focused Assessment: short of breath with activity, general weakness  Tests Performed: labs and imaging. Abnormal Results:  Labs Ordered and Resulted from Time of ED Arrival to Time of ED Departure   INR - Abnormal       Result Value    INR 1.17 (*)    COMPREHENSIVE METABOLIC PANEL - Abnormal    Sodium 134      Potassium 4.4      Chloride 101      Carbon Dioxide (CO2) 24      Anion Gap 9      Urea Nitrogen 34 (*)     Creatinine 1.26 (*)     Calcium 8.7      Glucose 155 (*)     Alkaline Phosphatase 67      AST 14      ALT 12      Protein Total 6.5 (*)     Albumin 3.0 (*)     Bilirubin Total 0.3      GFR Estimate 64     LACTIC ACID WHOLE BLOOD - Abnormal    Lactic Acid 3.7 (*)    OCCULT BLOOD STOOL - Abnormal    Occult Blood Positive (*)    CBC WITH PLATELETS AND DIFFERENTIAL - Abnormal    WBC Count 7.8      RBC Count 1.91 (*)     Hemoglobin 5.7 (*)     Hematocrit 18.5 (*)     MCV 97      MCH 29.8      MCHC  30.8 (*)     RDW 15.1 (*)     Platelet Count 257      % Neutrophils 74      % Lymphocytes 15      % Monocytes 6      % Eosinophils 3      % Basophils 1      % Immature Granulocytes 1      NRBCs per 100 WBC 0      Absolute Neutrophils 5.9      Absolute Lymphocytes 1.1      Absolute Monocytes 0.5      Absolute Eosinophils 0.2      Absolute Basophils 0.0      Absolute Immature Granulocytes 0.1      Absolute NRBCs 0.0     LIPASE - Normal    Lipase 193     ETHYL ALCOHOL LEVEL - Normal    Alcohol ethyl <0.01     TROPONIN I - Normal    Troponin I High Sensitivity 9     LACTIC ACID WHOLE BLOOD - Normal    Lactic Acid 0.9     COVID-19 VIRUS (CORONAVIRUS) BY PCR - Normal    SARS CoV2 PCR Negative     TYPE AND SCREEN, ADULT    ABO/RH(D) O POS      Antibody Screen Negative      SPECIMEN EXPIRATION DATE [id number]     PREPARE RED BLOOD CELLS (UNIT)    CROSSMATCH Compatible      UNIT ABO/RH O Pos      Unit Number [id number]      Unit Status Ready      Blood Component Type Red Blood Cells      Product Code O3029T69      CODING SYSTEM JIET221      UNIT TYPE ISBT 5100     PREPARE RED BLOOD CELLS (UNIT)    CROSSMATCH Compatible      UNIT ABO/RH O Pos      Unit Number [id number]      Unit Status Issued      Blood Component Type Red Blood Cells      Product Code Y8994S63      CODING SYSTEM BZDL090      UNIT TYPE ISBT 5100      ISSUE DATE AND TIME [id number]     PREPARE RED BLOOD CELLS (UNIT)   BLOOD CULTURE   BLOOD CULTURE   TRANSFUSE RED BLOOD CELLS (UNIT)   ABO/RH TYPE AND SCREEN     Family Comments: family aware of admit  OBS brochure/video discussed/provided to patient:  N/A  ED Medications:   Medications   0.9% sodium chloride BOLUS (0 mLs Intravenous Stopped 2/18/22 1700)     Followed by   sodium chloride 0.9% infusion (has no administration in time range)   0.9% sodium chloride BOLUS ( Intravenous Canceled Entry 2/18/22 1646)     Followed by   sodium chloride 0.9% infusion ( Intravenous Canceled Entry 2/18/22 1700)    ceFEPIme (MAXIPIME) 2 g vial to attach to  ml bag for ADULTS or 50 ml bag for PEDS (0 g Intravenous Stopped 2/18/22 1741)   metroNIDAZOLE (FLAGYL) infusion 500 mg (0 mg Intravenous Stopped 2/18/22 1700)   iopamidol (ISOVUE-370) solution 500 mL (80 mLs Intravenous Given 2/18/22 1625)   CT Scan Flush (39 mLs Intravenous Given 2/18/22 1625)   pantoprazole (PROTONIX) IV push injection 40 mg (40 mg Intravenous Given 2/18/22 1646)     Drips infusing:  Yes..blood. First unit started. Needs one more  For the majority of the shift, the patient's behavior Green.     Sepsis treatment initiated: No     Patient tested for COVID 19 prior to admission: YES    ED Nurse Name/Phone Number: Maame Almonte RN,   5:43 PM  RECEIVING UNIT ED HANDOFF REVIEW    Above ED Nurse Handoff Report was reviewed: Yes  Reviewed by: Keely Olmedo RN on February 18, 2022 at 9:41 PM

## 2022-02-18 NOTE — ED TRIAGE NOTES
Patient presents with concerns for low BP and black stools. Reports he has had black stools since Monday. Checked BP around 1345 at home today and reading was 85/38, checked it 10 minutes later and it read the same. With the low BP readings he has been feeling very weak. Patient also endorsing neck and back pain that started around Tuesday, which he chalked it up to his sleep number bed, so made adjustments with that with no relief. Took ibuprofen around 1300 today without relief.

## 2022-02-19 ENCOUNTER — HEALTH MAINTENANCE LETTER (OUTPATIENT)
Age: 62
End: 2022-02-19

## 2022-02-19 ENCOUNTER — APPOINTMENT (OUTPATIENT)
Dept: GENERAL RADIOLOGY | Facility: CLINIC | Age: 62
End: 2022-02-19
Attending: INTERNAL MEDICINE
Payer: COMMERCIAL

## 2022-02-19 LAB
ALBUMIN SERPL-MCNC: 2.8 G/DL (ref 3.4–5)
ALBUMIN UR-MCNC: NEGATIVE MG/DL
ALP SERPL-CCNC: 60 U/L (ref 40–150)
ALT SERPL W P-5'-P-CCNC: 10 U/L (ref 0–70)
ANION GAP SERPL CALCULATED.3IONS-SCNC: 5 MMOL/L (ref 3–14)
APPEARANCE UR: CLEAR
AST SERPL W P-5'-P-CCNC: 18 U/L (ref 0–45)
BILIRUB SERPL-MCNC: 0.3 MG/DL (ref 0.2–1.3)
BILIRUB UR QL STRIP: NEGATIVE
BLD PROD TYP BPU: NORMAL
BLOOD COMPONENT TYPE: NORMAL
BUN SERPL-MCNC: 25 MG/DL (ref 7–30)
CALCIUM SERPL-MCNC: 8.4 MG/DL (ref 8.5–10.1)
CHLORIDE BLD-SCNC: 107 MMOL/L (ref 94–109)
CO2 SERPL-SCNC: 26 MMOL/L (ref 20–32)
CODING SYSTEM: NORMAL
COLOR UR AUTO: NORMAL
CREAT SERPL-MCNC: 1.17 MG/DL (ref 0.66–1.25)
CROSSMATCH: NORMAL
ERYTHROCYTE [DISTWIDTH] IN BLOOD BY AUTOMATED COUNT: 15.4 % (ref 10–15)
GFR SERPL CREATININE-BSD FRML MDRD: 70 ML/MIN/1.73M2
GLUCOSE BLD-MCNC: 183 MG/DL (ref 70–99)
GLUCOSE BLDC GLUCOMTR-MCNC: 109 MG/DL (ref 70–99)
GLUCOSE BLDC GLUCOMTR-MCNC: 131 MG/DL (ref 70–99)
GLUCOSE BLDC GLUCOMTR-MCNC: 153 MG/DL (ref 70–99)
GLUCOSE BLDC GLUCOMTR-MCNC: 158 MG/DL (ref 70–99)
GLUCOSE BLDC GLUCOMTR-MCNC: 163 MG/DL (ref 70–99)
GLUCOSE BLDC GLUCOMTR-MCNC: 173 MG/DL (ref 70–99)
GLUCOSE BLDC GLUCOMTR-MCNC: 270 MG/DL (ref 70–99)
GLUCOSE UR STRIP-MCNC: NEGATIVE MG/DL
HCT VFR BLD AUTO: 24.5 % (ref 40–53)
HGB BLD-MCNC: 6.6 G/DL (ref 13.3–17.7)
HGB BLD-MCNC: 7.6 G/DL (ref 13.3–17.7)
HGB BLD-MCNC: 7.7 G/DL (ref 13.3–17.7)
HGB BLD-MCNC: 7.8 G/DL (ref 13.3–17.7)
HGB UR QL STRIP: NEGATIVE
INR PPP: 1.09 (ref 0.85–1.15)
ISSUE DATE AND TIME: NORMAL
KETONES UR STRIP-MCNC: NEGATIVE MG/DL
LEUKOCYTE ESTERASE UR QL STRIP: NEGATIVE
MCH RBC QN AUTO: 29.9 PG (ref 26.5–33)
MCHC RBC AUTO-ENTMCNC: 31.8 G/DL (ref 31.5–36.5)
MCV RBC AUTO: 94 FL (ref 78–100)
NITRATE UR QL: NEGATIVE
PH UR STRIP: 7 [PH] (ref 5–7)
PLATELET # BLD AUTO: 201 10E3/UL (ref 150–450)
POTASSIUM BLD-SCNC: 4.5 MMOL/L (ref 3.4–5.3)
PROT SERPL-MCNC: 6.4 G/DL (ref 6.8–8.8)
RBC # BLD AUTO: 2.61 10E6/UL (ref 4.4–5.9)
RBC URINE: 1 /HPF
SODIUM SERPL-SCNC: 138 MMOL/L (ref 133–144)
SP GR UR STRIP: 1.01 (ref 1–1.03)
SQUAMOUS EPITHELIAL: <1 /HPF
UNIT ABO/RH: NORMAL
UNIT NUMBER: NORMAL
UNIT STATUS: NORMAL
UNIT TYPE ISBT: 5100
UROBILINOGEN UR STRIP-MCNC: NORMAL MG/DL
WBC # BLD AUTO: 6.1 10E3/UL (ref 4–11)
WBC URINE: <1 /HPF

## 2022-02-19 PROCEDURE — C9113 INJ PANTOPRAZOLE SODIUM, VIA: HCPCS | Performed by: INTERNAL MEDICINE

## 2022-02-19 PROCEDURE — 999N000157 HC STATISTIC RCP TIME EA 10 MIN

## 2022-02-19 PROCEDURE — 250N000011 HC RX IP 250 OP 636: Performed by: INTERNAL MEDICINE

## 2022-02-19 PROCEDURE — 99233 SBSQ HOSP IP/OBS HIGH 50: CPT | Performed by: STUDENT IN AN ORGANIZED HEALTH CARE EDUCATION/TRAINING PROGRAM

## 2022-02-19 PROCEDURE — 120N000001 HC R&B MED SURG/OB

## 2022-02-19 PROCEDURE — 71046 X-RAY EXAM CHEST 2 VIEWS: CPT

## 2022-02-19 PROCEDURE — 85018 HEMOGLOBIN: CPT | Performed by: INTERNAL MEDICINE

## 2022-02-19 PROCEDURE — 250N000013 HC RX MED GY IP 250 OP 250 PS 637: Performed by: INTERNAL MEDICINE

## 2022-02-19 PROCEDURE — 81001 URINALYSIS AUTO W/SCOPE: CPT | Performed by: INTERNAL MEDICINE

## 2022-02-19 PROCEDURE — 85027 COMPLETE CBC AUTOMATED: CPT | Performed by: INTERNAL MEDICINE

## 2022-02-19 PROCEDURE — 258N000003 HC RX IP 258 OP 636: Performed by: INTERNAL MEDICINE

## 2022-02-19 PROCEDURE — 80053 COMPREHEN METABOLIC PANEL: CPT | Performed by: INTERNAL MEDICINE

## 2022-02-19 PROCEDURE — 85610 PROTHROMBIN TIME: CPT | Performed by: INTERNAL MEDICINE

## 2022-02-19 PROCEDURE — P9016 RBC LEUKOCYTES REDUCED: HCPCS | Performed by: INTERNAL MEDICINE

## 2022-02-19 PROCEDURE — 94660 CPAP INITIATION&MGMT: CPT

## 2022-02-19 PROCEDURE — 250N000012 HC RX MED GY IP 250 OP 636 PS 637: Performed by: INTERNAL MEDICINE

## 2022-02-19 PROCEDURE — 36415 COLL VENOUS BLD VENIPUNCTURE: CPT | Performed by: INTERNAL MEDICINE

## 2022-02-19 RX ORDER — FENTANYL CITRATE 50 UG/ML
25 INJECTION, SOLUTION INTRAMUSCULAR; INTRAVENOUS EVERY 5 MIN PRN
Status: ACTIVE | OUTPATIENT
Start: 2022-02-20 | End: 2022-02-21

## 2022-02-19 RX ORDER — NALOXONE HYDROCHLORIDE 0.4 MG/ML
0.4 INJECTION, SOLUTION INTRAMUSCULAR; INTRAVENOUS; SUBCUTANEOUS
Status: DISCONTINUED | OUTPATIENT
Start: 2022-02-19 | End: 2022-02-21 | Stop reason: HOSPADM

## 2022-02-19 RX ORDER — FLUMAZENIL 0.1 MG/ML
0.2 INJECTION, SOLUTION INTRAVENOUS
Status: DISCONTINUED | OUTPATIENT
Start: 2022-02-19 | End: 2022-02-21 | Stop reason: HOSPADM

## 2022-02-19 RX ORDER — NALOXONE HYDROCHLORIDE 0.4 MG/ML
0.2 INJECTION, SOLUTION INTRAMUSCULAR; INTRAVENOUS; SUBCUTANEOUS
Status: DISCONTINUED | OUTPATIENT
Start: 2022-02-19 | End: 2022-02-21 | Stop reason: HOSPADM

## 2022-02-19 RX ORDER — FENTANYL CITRATE 50 UG/ML
50 INJECTION, SOLUTION INTRAMUSCULAR; INTRAVENOUS
Status: COMPLETED | OUTPATIENT
Start: 2022-02-20 | End: 2022-02-20

## 2022-02-19 RX ADMIN — SODIUM CHLORIDE: 9 INJECTION, SOLUTION INTRAVENOUS at 01:55

## 2022-02-19 RX ADMIN — INSULIN ASPART 3 UNITS: 100 INJECTION, SOLUTION INTRAVENOUS; SUBCUTANEOUS at 16:22

## 2022-02-19 RX ADMIN — SODIUM CHLORIDE: 9 INJECTION, SOLUTION INTRAVENOUS at 23:34

## 2022-02-19 RX ADMIN — SODIUM CHLORIDE: 9 INJECTION, SOLUTION INTRAVENOUS at 14:13

## 2022-02-19 RX ADMIN — CEFTRIAXONE 2 G: 2 INJECTION, POWDER, FOR SOLUTION INTRAMUSCULAR; INTRAVENOUS at 01:47

## 2022-02-19 RX ADMIN — PANTOPRAZOLE SODIUM 40 MG: 40 INJECTION, POWDER, FOR SOLUTION INTRAVENOUS at 20:19

## 2022-02-19 RX ADMIN — Medication 1 MG: at 20:26

## 2022-02-19 RX ADMIN — PANTOPRAZOLE SODIUM 40 MG: 40 INJECTION, POWDER, FOR SOLUTION INTRAVENOUS at 09:10

## 2022-02-19 ASSESSMENT — ACTIVITIES OF DAILY LIVING (ADL)
ADLS_ACUITY_SCORE: 3

## 2022-02-19 NOTE — PLAN OF CARE
"VITALS: /69 (BP Location: Right arm)   Pulse 75   Temp 98.4  F (36.9  C) (Oral)   Resp 20   Ht 1.803 m (5' 11\")   Wt 130 kg (286 lb 9.6 oz)   SpO2 98%   BMI 39.97 kg/m      PAIN: Denies.     BLOOD GLUCOSE: , 2am 153, 6am 173. No insulin given: patient is NPO.    RESPIRATORY: LS clear throughout. Respiratory brought CPAP per his request.     CARDIAC/TELE: Tele SR    BOWEL SOUNDS: +X4Q     GI/: Continent urine. No bm this shift.     SKIN: Blanchable redness to sacrum. Legs lei. Edematous. Had PCD on but then later wanted them off.     LDA: L PIV,   Had 2 units blood tonight on this floor. After 1st one Hgb went from 5.7 to 6.6. Then 2nd unit on this floor stated. Also had 1 unit in the ED prior to this floor. No adverse reaction noted or reported. NS infusing at 100 /hr continuous currently.     DIET: NPO    ACTIVITY: SBA     PLAN: Stabilize Hgb and continue current POC.    Continues on Rocephin for Lactic acidosis.                           "

## 2022-02-19 NOTE — PLAN OF CARE
VSS denies pain. Positive Occult. Pt received total x3 units blood, admission hgb 5.7.  Hgb 7.8, q4 hgb checks. NPO, GI to see. Monitoring Bg's. Lactic on admission elevated, on IVFs, Lactic improved, on IV Rocephin.

## 2022-02-19 NOTE — ED NOTES
Patient up to bedside chair with SBA. Uncomfortable in bed, now causing back discomfort. Tolerating blood, no complaints.

## 2022-02-19 NOTE — PHARMACY-ADMISSION MEDICATION HISTORY
Admission medication history interview status for this patient is complete. See [facility] admission navigator for allergy information, prior to admission medications and immunization status.     Medication history interview done, indicate source(s): Patient  Medication history resources (including written lists, pill bottles, clinic record):lmed list on phone  Pharmacy: -    Changes made to PTA medication list:  Added: ellen seltzer, ibuprofen, klor  Changed: zoloft,   Reported as Not Taking: -  Removed: -    Actions taken by pharmacist (provider contacted, etc):None     Additional medication history information:None    Medication reconciliation/reorder completed by provider prior to medication history?  no   (Y/N)     For patients on insulin therapy:   Do you use sliding scale insulin based on blood sugars?   What is your pre-meal insulin coverage?    Do you typically eat three meals a day?   How many times do you check your blood glucose per day?   How many episodes of hypoglycemia do you typically have per month?   Do you have a Continuous Glucose Monitor (CGM)?      Prior to Admission medications    Medication Sig Last Dose Taking? Auth Provider   ACTOS OR Take 45 mg by mouth daily  2/18/2022 at Unknown time Yes Reported, Patient   amLODIPine (NORVASC) 10 MG tablet Take 1 tablet (10 mg) by mouth daily 2/18/2022 at Unknown time Yes Eren Rg MD   apixaban ANTICOAGULANT (ELIQUIS) 5 MG tablet Take 1 tablet (5 mg) by mouth 2 times daily 2/18/2022 at x1 Yes Miguelito Bowers MD   balsalazide (COLAZAL) 750 MG capsule Take 2,250 mg by mouth 2 times daily 2/18/2022 at x2 Yes Unknown, Entered By History   ibuprofen (ADVIL/MOTRIN) 200 MG capsule Take 800 mg by mouth 2 times daily as needed for fever 2/18/2022 at Unknown time Yes Unknown, Entered By History   lisinopril (ZESTRIL) 20 MG tablet Take 20 mg by mouth daily 2/18/2022 at Unknown time Yes Reported, Patient   potassium chloride ER (KLOR-CON M) 10 MEQ CR tablet Take 10  mEq by mouth daily 2/18/2022 at Unknown time Yes Unknown, Entered By History   Sodium Bicarbonate-Citric Acid (MATHEUS-SELTZER HEARTBURN PO) Take 2 tablets by mouth 2 times daily as needed 2/18/2022 at Unknown time Yes Unknown, Entered By History   torsemide (DEMADEX) 20 MG tablet Take 1 tablet by mouth every 24 hours 2/18/2022 at Unknown time Yes Reported, Patient   ZOLOFT 100 MG OR TABS Take 150 mg by mouth  2/18/2022 at Unknown time Yes Reported, Patient   ACCU-CHEK GUIDE test strip USE ONE STRIP 2-3 TIMES PER DAY   Reported, Patient   blood glucose monitoring (ACCU-CHEK FASTCLIX) lancets USE TO TEST SUGARS ONCE DAILY AS DIRECTED   Reported, Patient

## 2022-02-19 NOTE — PROGRESS NOTES
[facility]    Medicine Progress Note - Hospitalist Service    Date of Admission:  2/18/2022    Assessment & Plan        61-year-old male who has history of ulcerative colitis test and previous history of gastric bypass and bleeding anastomotic ulcer in 2009.  He is also on apixaban for atrial fibrillation and takes ibuprofen 800 mg twice a day.  He presented with black stools for 4 to 5 days.    Upon arrival to ER blood pressure was 98/66 with heart rate of 73, hemoglobin of 5.7 and lactate 3.7.    Plan    1. Acute blood loss anemia with hemorrhagic shock and lactic acidosis secondary to GI bleed.   1. Hemoglobin improved from 5.7-7.8 with 3 units of PRBC.    2. He does not have continued bleeding.  Continue n.p.o., Protonix twice a day and await GI consult.  Monitor hemoglobin every 4 hours and transfuse for hemoglobin less than 7.  3. Hold apixaban.  Advised not to take NSAIDs.  4. This is likely upper GI bleed, suspect anastomotic ulcer again.  Patient also has ulcerative colitis but this is unlikely to be a colonic bleed.  Await GI consult, may need to do colonoscopy if the EGD is unrevealing.  5. Discontinue antibiotics.  2. Essential hypertension.  Prior to admission on amlodipine, lisinopril and torsemide.  Will hold due to low blood pressure.  3. Ulcerative colitis.  On balsalazide at home, can be resumed when starts taking p.o.  4. Prerenal VANI secondary to volume loss.  Creatinine on presentation was 1.26, baseline less than 1.  5. Diabetes mellitus, type II.  Continue sliding scale.  No diabetes meds listed in the home medications but med rec is pending.  6. Previous alcohol dependence.  7. Paroxysmal atrial fibrillation.  Apixaban on hold due to GI bleed.  Currently in sinus rhythm.  8. Sleep apnea.  Continue CPAP.  9. Pulmonary nodule inguinal lymphadenopathy. Needs further work-up either inpatient versus outpatient with primary care provider soon after discharge versus repeat  "imaging in the near future. These issues were discussed with the patient by Dr. Dillard and he is aware of these abnormalities that need further attention, likely in outpatient setting.         Diet: NPO for Medical/Clinical Reasons Except for: Meds, Ice Chips    DVT Prophylaxis: Pneumatic Compression Devices  Beltran Catheter: Not present  Central Lines: None  Cardiac Monitoring: ACTIVE order. Indication: Tachyarrhythmias, acute (48 hours)  Code Status: Full Code      Disposition Plan   Expected Discharge: 02/21/2022     Anticipated discharge location:  Awaiting care coordination huddle  Delays:            The patient's care was discussed with the Patient and RN..    Alfredo Godwin MD  Hospitalist Service  [facility]  Securely message with the Vocera Web Console (learn more here)  Text page via [employer] Paging/Directory     Clinically Significant Risk Factors Present on Admission              # Coagulation Defect: home medication list includes an anticoagulant medication    # Obesity: Estimated body mass index is 39.97 kg/m  as calculated from the following:    Height as of this encounter: 1.803 m (5' 11\").    Weight as of this encounter: 130 kg (286 lb 9.6 oz).      ______________________________________________________________________    Interval History   No further bleeding.    Data reviewed today: I reviewed all medications, new labs and imaging results over the last 24 hours. I personally reviewed the EKG tracing showing Sinus rhythm.    Physical Exam   Vital Signs: Temp: 98.5  F (36.9  C) Temp src: Oral BP: 113/65 Pulse: 79   Resp: 20 SpO2: 100 % O2 Device: None (Room air)    Weight: 286 lbs 9.6 oz  General Appearance: In no acute distress.  Respiratory: Clear to auscultation  Cardiovascular: S1-S2 normal  GI: Nontender  Skin: No break  Other: No leg edema    Data   Recent Labs   Lab 02/19/22  1230 02/19/22  0948 02/19/22  0812 02/19/22  0605 02/19/22  0602 02/19/22  0201 02/19/22  0147 " 02/18/22 2236 02/18/22  1548   WBC  --   --   --   --  6.1  --   --   --  7.8   HGB  --  7.8*  --   --  7.8*  7.8*  --  6.6*  --  5.7*   MCV  --   --   --   --  94  --   --   --  97   PLT  --   --   --   --  201  --   --   --  257   INR  --   --   --   --  1.09  --   --   --  1.17*   NA  --   --   --   --  138  --   --   --  134   POTASSIUM  --   --   --   --  4.5  --   --   --  4.4   CHLORIDE  --   --   --   --  107  --   --   --  101   CO2  --   --   --   --  26  --   --   --  24   BUN  --   --   --   --  25  --   --   --  34*   CR  --   --   --   --  1.17  --   --   --  1.26*   ANIONGAP  --   --   --   --  5  --   --   --  9   RENATE  --   --   --   --  8.4*  --   --   --  8.7   *  --  158* 173* 183*   < >  --    < > 155*   ALBUMIN  --   --   --   --  2.8*  --   --   --  3.0*   PROTTOTAL  --   --   --   --  6.4*  --   --   --  6.5*   BILITOTAL  --   --   --   --  0.3  --   --   --  0.3   ALKPHOS  --   --   --   --  60  --   --   --  67   ALT  --   --   --   --  10  --   --   --  12   AST  --   --   --   --  18  --   --   --  14   LIPASE  --   --   --   --   --   --   --   --  193    < > = values in this interval not displayed.
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
## 2022-02-19 NOTE — PROGRESS NOTES
A CPAP of +7 @ 21% was applied to the pt via the mask for sleep apnea. Skin is intact. Pt is tolerating it well. Will continue to monitor and assess the pt's current respiratory status and needs.    RT Rodríguez on 2/19/2022 at 6:41 AM

## 2022-02-19 NOTE — PROGRESS NOTES
Cross Cover    Called for hgb 6.6    Admit for Acute GIB  hgb 5.7-> 6.6 after 2 Units will transfuse 1 more unit and recheck hgb in am     If needs an additional unit may need to consider transfusion or platelets/cryo etc

## 2022-02-20 LAB
GLUCOSE BLDC GLUCOMTR-MCNC: 116 MG/DL (ref 70–99)
GLUCOSE BLDC GLUCOMTR-MCNC: 131 MG/DL (ref 70–99)
GLUCOSE BLDC GLUCOMTR-MCNC: 131 MG/DL (ref 70–99)
GLUCOSE BLDC GLUCOMTR-MCNC: 152 MG/DL (ref 70–99)
GLUCOSE BLDC GLUCOMTR-MCNC: 154 MG/DL (ref 70–99)
HGB BLD-MCNC: 7.3 G/DL (ref 13.3–17.7)
HGB BLD-MCNC: 7.5 G/DL (ref 13.3–17.7)
HGB BLD-MCNC: 7.8 G/DL (ref 13.3–17.7)
HGB BLD-MCNC: 8.2 G/DL (ref 13.3–17.7)
HGB BLD-MCNC: 8.3 G/DL (ref 13.3–17.7)
HGB BLD-MCNC: 8.6 G/DL (ref 13.3–17.7)
UPPER GI ENDOSCOPY: NORMAL

## 2022-02-20 PROCEDURE — 250N000013 HC RX MED GY IP 250 OP 250 PS 637: Performed by: INTERNAL MEDICINE

## 2022-02-20 PROCEDURE — C9113 INJ PANTOPRAZOLE SODIUM, VIA: HCPCS | Performed by: INTERNAL MEDICINE

## 2022-02-20 PROCEDURE — 999N000157 HC STATISTIC RCP TIME EA 10 MIN

## 2022-02-20 PROCEDURE — 0DJ08ZZ INSPECTION OF UPPER INTESTINAL TRACT, VIA NATURAL OR ARTIFICIAL OPENING ENDOSCOPIC: ICD-10-PCS | Performed by: INTERNAL MEDICINE

## 2022-02-20 PROCEDURE — 94660 CPAP INITIATION&MGMT: CPT

## 2022-02-20 PROCEDURE — 258N000003 HC RX IP 258 OP 636: Performed by: INTERNAL MEDICINE

## 2022-02-20 PROCEDURE — 250N000009 HC RX 250: Performed by: INTERNAL MEDICINE

## 2022-02-20 PROCEDURE — 250N000011 HC RX IP 250 OP 636: Performed by: INTERNAL MEDICINE

## 2022-02-20 PROCEDURE — 85018 HEMOGLOBIN: CPT | Performed by: INTERNAL MEDICINE

## 2022-02-20 PROCEDURE — 99232 SBSQ HOSP IP/OBS MODERATE 35: CPT | Performed by: STUDENT IN AN ORGANIZED HEALTH CARE EDUCATION/TRAINING PROGRAM

## 2022-02-20 PROCEDURE — 120N000001 HC R&B MED SURG/OB

## 2022-02-20 PROCEDURE — 36415 COLL VENOUS BLD VENIPUNCTURE: CPT | Performed by: INTERNAL MEDICINE

## 2022-02-20 PROCEDURE — 999N000099 HC STATISTIC MODERATE SEDATION < 10 MIN: Performed by: INTERNAL MEDICINE

## 2022-02-20 PROCEDURE — 43235 EGD DIAGNOSTIC BRUSH WASH: CPT | Performed by: INTERNAL MEDICINE

## 2022-02-20 PROCEDURE — 250N000013 HC RX MED GY IP 250 OP 250 PS 637: Performed by: STUDENT IN AN ORGANIZED HEALTH CARE EDUCATION/TRAINING PROGRAM

## 2022-02-20 RX ORDER — SUCRALFATE ORAL 1 G/10ML
1 SUSPENSION ORAL
Status: DISCONTINUED | OUTPATIENT
Start: 2022-02-20 | End: 2022-02-21 | Stop reason: HOSPADM

## 2022-02-20 RX ORDER — DIPHENHYDRAMINE HCL 25 MG
25 CAPSULE ORAL
Status: DISCONTINUED | OUTPATIENT
Start: 2022-02-20 | End: 2022-02-21 | Stop reason: HOSPADM

## 2022-02-20 RX ORDER — LIDOCAINE 40 MG/G
CREAM TOPICAL
Status: DISCONTINUED | OUTPATIENT
Start: 2022-02-20 | End: 2022-02-20 | Stop reason: HOSPADM

## 2022-02-20 RX ORDER — FLUMAZENIL 0.1 MG/ML
0.2 INJECTION, SOLUTION INTRAVENOUS
Status: ACTIVE | OUTPATIENT
Start: 2022-02-20 | End: 2022-02-20

## 2022-02-20 RX ADMIN — PANTOPRAZOLE SODIUM 40 MG: 40 INJECTION, POWDER, FOR SOLUTION INTRAVENOUS at 20:47

## 2022-02-20 RX ADMIN — SUCRALFATE ORAL 1 G: 1 SUSPENSION ORAL at 18:03

## 2022-02-20 RX ADMIN — SERTRALINE HYDROCHLORIDE 150 MG: 50 TABLET ORAL at 11:20

## 2022-02-20 RX ADMIN — Medication 1 MG: at 20:47

## 2022-02-20 RX ADMIN — PANTOPRAZOLE SODIUM 40 MG: 40 INJECTION, POWDER, FOR SOLUTION INTRAVENOUS at 09:05

## 2022-02-20 RX ADMIN — DIPHENHYDRAMINE HYDROCHLORIDE 25 MG: 25 CAPSULE ORAL at 20:47

## 2022-02-20 RX ADMIN — INSULIN ASPART 1 UNITS: 100 INJECTION, SOLUTION INTRAVENOUS; SUBCUTANEOUS at 20:50

## 2022-02-20 RX ADMIN — SUCRALFATE ORAL 1 G: 1 SUSPENSION ORAL at 11:20

## 2022-02-20 RX ADMIN — MIDAZOLAM 2 MG: 1 INJECTION INTRAMUSCULAR; INTRAVENOUS at 07:48

## 2022-02-20 RX ADMIN — SODIUM CHLORIDE: 9 INJECTION, SOLUTION INTRAVENOUS at 13:52

## 2022-02-20 RX ADMIN — TOPICAL ANESTHETIC 1 SPRAY: 200 SPRAY DENTAL; PERIODONTAL at 07:48

## 2022-02-20 RX ADMIN — SODIUM CHLORIDE: 9 INJECTION, SOLUTION INTRAVENOUS at 23:01

## 2022-02-20 RX ADMIN — INSULIN ASPART 1 UNITS: 100 INJECTION, SOLUTION INTRAVENOUS; SUBCUTANEOUS at 12:01

## 2022-02-20 RX ADMIN — FENTANYL CITRATE 50 MCG: 0.05 INJECTION, SOLUTION INTRAMUSCULAR; INTRAVENOUS at 07:48

## 2022-02-20 RX ADMIN — SODIUM CHLORIDE: 9 INJECTION, SOLUTION INTRAVENOUS at 12:05

## 2022-02-20 ASSESSMENT — ACTIVITIES OF DAILY LIVING (ADL)
ADLS_ACUITY_SCORE: 3

## 2022-02-20 NOTE — PROGRESS NOTES
A CPAP of  +7 @ 21% was applied to the pt via the mask for sleep apnea. Skin is intact. Pt is tolerating it well. Will continue to monitor and assess the pt's current respiratory status and needs.    RT Rodríguez on 2/20/2022 at 4:27 AM

## 2022-02-20 NOTE — PLAN OF CARE
VSS.  AOX4.  Breathing is unlabored and pt denies SOB.  Independent in room.  Pt denies pain.  Clear liquid diet with good appetite.    Hgb 7.8; no transfusion indicated.     BG's 270 and 109    Pt running NS at 100 ml/hour with good output.    Pt requested melatonin before bed this evening; CPAP applied by respiratory.    Tele:

## 2022-02-20 NOTE — PROGRESS NOTES
VSS. A&Ox4. Diet was advanced from clear liquids to reg diet as tolerated today.  and 151. PIV,  ml/hr. EGD today, no active bleed, non bleeding ulcer found. See GI note. Continue to follow POC.

## 2022-02-20 NOTE — PROGRESS NOTES
[facility]    Medicine Progress Note - Hospitalist Service    Date of Admission:  2/18/2022    Assessment & Plan        61-year-old male who has history of ulcerative colitis test and previous history of gastric bypass and bleeding anastomotic ulcer in 2009.  He is also on apixaban for atrial fibrillation and takes ibuprofen 800 mg twice a day.  He presented with black stools for 4 to 5 days.    Upon arrival to ER blood pressure was 98/66 with heart rate of 73, hemoglobin of 5.7 and lactate 3.7.    Plan    1. Acute blood loss anemia with hemorrhagic shock and lactic acidosis secondary to GI bleed.   1. Hemoglobin improved from 5.7-7.8 with 3 units of PRBC.  Hemoglobin now stable at about 8.  2. EGD showed nonbleeding gastrojejunal anastomotic ulcer.   3. Monitor overnight, discharged on Protonix 40 mg twice daily for 2 months and 40 mg daily thereafter.  Also continue Carafate 1 g 3 times daily for 1 month.  4. Hold apixaban for 1 month.   5. Advised not to take NSAIDs.  2. Essential hypertension.  Prior to admission on amlodipine, lisinopril and torsemide.  Will hold due to low blood pressure.  3. Ulcerative colitis.  On balsalazide at home, can be resumed on discharge.  4. Prerenal VANI secondary to volume loss.  Creatinine on presentation was 1.26, baseline less than 1.  5. Diabetes mellitus, type II.  Continue sliding scale.  Prior to admission on Actos.  6. Previous alcohol dependence.  7. Paroxysmal atrial fibrillation.  Apixaban will be held for 1 month due to GI bleed.  Currently in sinus rhythm.  8. Sleep apnea.  Continue CPAP.  9. Pulmonary nodule inguinal lymphadenopathy. Needs further work-up either inpatient versus outpatient with primary care provider soon after discharge versus repeat imaging in the near future. These issues were discussed with the patient by Dr. Dillard and he is aware of these abnormalities that need further attention, likely in outpatient setting.        "  Diet: Bariatric Diet Clear Liquids    DVT Prophylaxis: Pneumatic Compression Devices  Beltran Catheter: Not present  Central Lines: None  Cardiac Monitoring: ACTIVE order. Indication: Tachyarrhythmias, acute (48 hours)  Code Status: Full Code      Disposition Plan   Expected Discharge: 02/21/2022     Anticipated discharge location:  Awaiting care coordination huddle  Delays:            The patient's care was discussed with the Patient and RN..    Alfredo Godwin MD  Hospitalist Service  [facility]  Securely message with the [employer] Web Console (learn more here)  Text page via [employer] Paging/Directory     Clinically Significant Risk Factors Present on Admission              # Obesity: Estimated body mass index is 39.97 kg/m  as calculated from the following:    Height as of this encounter: 1.803 m (5' 11\").    Weight as of this encounter: 130 kg (286 lb 9.6 oz).      ______________________________________________________________________    Interval History   No further bleeding.  Endoscopy done today.    Data reviewed today: I reviewed all medications, new labs and imaging results over the last 24 hours.    Physical Exam   Vital Signs: Temp: 98  F (36.7  C) Temp src: Oral BP: 121/79 (Simultaneous filing. User may be unaware of other data.) Pulse: 80 (Simultaneous filing. User may be unaware of other data.)   Resp: 14 (Simultaneous filing. User may be unaware of other data.) SpO2: 100 % (Simultaneous filing. User may be unaware of other data.) O2 Device: None (Room air) Oxygen Delivery: 3 LPM  Weight: 286 lbs 9.6 oz  General Appearance: In no acute distress.  Respiratory: Clear to auscultation  Cardiovascular: S1-S2 normal  GI: Nontender  Skin: No break  Other: No leg edema    Data   Recent Labs   Lab 02/20/22  1009 02/20/22  0928 02/20/22  0540 02/20/22  0413 02/20/22  0156 02/19/22  2341 02/19/22  0605 02/19/22  0602 02/18/22  2236 02/18/22  1548   WBC  --   --   --   --   --   --   --  6.1  --  7.8 "   HGB 8.2*  --  7.5*  --  7.3*  --    < > 7.8*  7.8*   < > 5.7*   MCV  --   --   --   --   --   --   --  94  --  97   PLT  --   --   --   --   --   --   --  201  --  257   INR  --   --   --   --   --   --   --  1.09  --  1.17*   NA  --   --   --   --   --   --   --  138  --  134   POTASSIUM  --   --   --   --   --   --   --  4.5  --  4.4   CHLORIDE  --   --   --   --   --   --   --  107  --  101   CO2  --   --   --   --   --   --   --  26  --  24   BUN  --   --   --   --   --   --   --  25  --  34*   CR  --   --   --   --   --   --   --  1.17  --  1.26*   ANIONGAP  --   --   --   --   --   --   --  5  --  9   RENATE  --   --   --   --   --   --   --  8.4*  --  8.7   GLC  --  131*  --  131*  --  131*   < > 183*   < > 155*   ALBUMIN  --   --   --   --   --   --   --  2.8*  --  3.0*   PROTTOTAL  --   --   --   --   --   --   --  6.4*  --  6.5*   BILITOTAL  --   --   --   --   --   --   --  0.3  --  0.3   ALKPHOS  --   --   --   --   --   --   --  60  --  67   ALT  --   --   --   --   --   --   --  10  --  12   AST  --   --   --   --   --   --   --  18  --  14   LIPASE  --   --   --   --   --   --   --   --   --  193    < > = values in this interval not displayed.

## 2022-02-20 NOTE — OR NURSING
Patient tolerated EGD in endoscopy. 2mg versed and 100mcg fentanyl given per MD orders. Pt's vitals stable throughout procedure, on 6L oxymask per MD request. Pt stable in recovery and on room air. Report called to floor RN at 0815. Rubi Edwards on 2/20/2022 at 8:17 AM
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
## 2022-02-20 NOTE — PLAN OF CARE
"VITALS: /68 (BP Location: Right arm)   Pulse 76   Temp 98.1  F (36.7  C) (Oral)   Resp 20   Ht 1.803 m (5' 11\")   Wt 130 kg (286 lb 9.6 oz)   SpO2 100%   BMI 39.97 kg/m      PAIN: Denies    BLOOD GLUCOSE: 12am .    4am BG     RESPIRATORY: LS clear throughout. No cough. Has own mask from home and is more comfortable with the CPAP tonight.     CARDIAC/TELE: SR    BOWEL SOUNDS: +X4Q.     GI/: Continent urine with urinal. Good output. Urine is straw colored. Passing flatus. No bm this shift.     LDA: L PIV infusing NS at 100/hr.     DIET: NPO since 12 am for EGD procedure this am.     ACTIVITY: Indep in room.     FOLLOWED BY: Gastroenterology    PLAN: EGD this am.     HGB checked Q4H. @11pm  7.7     @ 2am  7.3   @ 5:40am   7.5                            "

## 2022-02-21 VITALS
BODY MASS INDEX: 40.12 KG/M2 | SYSTOLIC BLOOD PRESSURE: 133 MMHG | HEART RATE: 66 BPM | TEMPERATURE: 98 F | HEIGHT: 71 IN | DIASTOLIC BLOOD PRESSURE: 83 MMHG | OXYGEN SATURATION: 99 % | RESPIRATION RATE: 18 BRPM | WEIGHT: 286.6 LBS

## 2022-02-21 LAB
ANION GAP SERPL CALCULATED.3IONS-SCNC: 4 MMOL/L (ref 3–14)
ATRIAL RATE - MUSE: 77 BPM
BASOPHILS # BLD AUTO: 0 10E3/UL (ref 0–0.2)
BASOPHILS NFR BLD AUTO: 1 %
BUN SERPL-MCNC: 9 MG/DL (ref 7–30)
CALCIUM SERPL-MCNC: 8.7 MG/DL (ref 8.5–10.1)
CHLORIDE BLD-SCNC: 109 MMOL/L (ref 94–109)
CO2 SERPL-SCNC: 25 MMOL/L (ref 20–32)
CREAT SERPL-MCNC: 0.88 MG/DL (ref 0.66–1.25)
DIASTOLIC BLOOD PRESSURE - MUSE: NORMAL MMHG
EOSINOPHIL # BLD AUTO: 0.2 10E3/UL (ref 0–0.7)
EOSINOPHIL NFR BLD AUTO: 4 %
ERYTHROCYTE [DISTWIDTH] IN BLOOD BY AUTOMATED COUNT: 15.2 % (ref 10–15)
GFR SERPL CREATININE-BSD FRML MDRD: >90 ML/MIN/1.73M2
GLUCOSE BLD-MCNC: 159 MG/DL (ref 70–99)
GLUCOSE BLDC GLUCOMTR-MCNC: 134 MG/DL (ref 70–99)
GLUCOSE BLDC GLUCOMTR-MCNC: 139 MG/DL (ref 70–99)
GLUCOSE BLDC GLUCOMTR-MCNC: 145 MG/DL (ref 70–99)
HCT VFR BLD AUTO: 25.1 % (ref 40–53)
HGB BLD-MCNC: 7.5 G/DL (ref 13.3–17.7)
HGB BLD-MCNC: 7.9 G/DL (ref 13.3–17.7)
HGB BLD-MCNC: 9 G/DL (ref 13.3–17.7)
IMM GRANULOCYTES # BLD: 0 10E3/UL
IMM GRANULOCYTES NFR BLD: 1 %
INTERPRETATION ECG - MUSE: NORMAL
LYMPHOCYTES # BLD AUTO: 0.8 10E3/UL (ref 0.8–5.3)
LYMPHOCYTES NFR BLD AUTO: 14 %
MCH RBC QN AUTO: 29.3 PG (ref 26.5–33)
MCHC RBC AUTO-ENTMCNC: 31.5 G/DL (ref 31.5–36.5)
MCV RBC AUTO: 93 FL (ref 78–100)
MONOCYTES # BLD AUTO: 0.4 10E3/UL (ref 0–1.3)
MONOCYTES NFR BLD AUTO: 8 %
NEUTROPHILS # BLD AUTO: 4.1 10E3/UL (ref 1.6–8.3)
NEUTROPHILS NFR BLD AUTO: 72 %
NRBC # BLD AUTO: 0 10E3/UL
NRBC BLD AUTO-RTO: 0 /100
P AXIS - MUSE: 54 DEGREES
PLATELET # BLD AUTO: 230 10E3/UL (ref 150–450)
POTASSIUM BLD-SCNC: 4.2 MMOL/L (ref 3.4–5.3)
PR INTERVAL - MUSE: 166 MS
QRS DURATION - MUSE: 88 MS
QT - MUSE: 402 MS
QTC - MUSE: 454 MS
R AXIS - MUSE: 10 DEGREES
RBC # BLD AUTO: 2.7 10E6/UL (ref 4.4–5.9)
SODIUM SERPL-SCNC: 138 MMOL/L (ref 133–144)
SYSTOLIC BLOOD PRESSURE - MUSE: NORMAL MMHG
T AXIS - MUSE: 29 DEGREES
VENTRICULAR RATE- MUSE: 77 BPM
WBC # BLD AUTO: 5.5 10E3/UL (ref 4–11)

## 2022-02-21 PROCEDURE — 85018 HEMOGLOBIN: CPT | Performed by: STUDENT IN AN ORGANIZED HEALTH CARE EDUCATION/TRAINING PROGRAM

## 2022-02-21 PROCEDURE — 250N000013 HC RX MED GY IP 250 OP 250 PS 637: Performed by: INTERNAL MEDICINE

## 2022-02-21 PROCEDURE — 250N000013 HC RX MED GY IP 250 OP 250 PS 637: Performed by: STUDENT IN AN ORGANIZED HEALTH CARE EDUCATION/TRAINING PROGRAM

## 2022-02-21 PROCEDURE — 36415 COLL VENOUS BLD VENIPUNCTURE: CPT | Performed by: INTERNAL MEDICINE

## 2022-02-21 PROCEDURE — 94660 CPAP INITIATION&MGMT: CPT

## 2022-02-21 PROCEDURE — 80048 BASIC METABOLIC PNL TOTAL CA: CPT | Performed by: STUDENT IN AN ORGANIZED HEALTH CARE EDUCATION/TRAINING PROGRAM

## 2022-02-21 PROCEDURE — 999N000157 HC STATISTIC RCP TIME EA 10 MIN

## 2022-02-21 PROCEDURE — 85018 HEMOGLOBIN: CPT | Performed by: INTERNAL MEDICINE

## 2022-02-21 PROCEDURE — 36415 COLL VENOUS BLD VENIPUNCTURE: CPT | Performed by: STUDENT IN AN ORGANIZED HEALTH CARE EDUCATION/TRAINING PROGRAM

## 2022-02-21 PROCEDURE — 99239 HOSP IP/OBS DSCHRG MGMT >30: CPT | Performed by: INTERNAL MEDICINE

## 2022-02-21 RX ORDER — PANTOPRAZOLE SODIUM 40 MG/1
40 TABLET, DELAYED RELEASE ORAL
Qty: 60 TABLET | Refills: 1 | Status: SHIPPED | OUTPATIENT
Start: 2022-02-21 | End: 2023-01-01

## 2022-02-21 RX ORDER — SUCRALFATE ORAL 1 G/10ML
1 SUSPENSION ORAL
Qty: 900 ML | Refills: 0 | Status: SHIPPED | OUTPATIENT
Start: 2022-02-21 | End: 2022-03-23

## 2022-02-21 RX ORDER — PANTOPRAZOLE SODIUM 40 MG/1
40 TABLET, DELAYED RELEASE ORAL
Status: DISCONTINUED | OUTPATIENT
Start: 2022-02-21 | End: 2022-02-21 | Stop reason: HOSPADM

## 2022-02-21 RX ORDER — ACETAMINOPHEN 325 MG/1
650 TABLET ORAL EVERY 6 HOURS PRN
Qty: 60 TABLET | Refills: 1
Start: 2022-02-21 | End: 2023-01-01

## 2022-02-21 RX ADMIN — PANTOPRAZOLE SODIUM 40 MG: 40 TABLET, DELAYED RELEASE ORAL at 09:02

## 2022-02-21 RX ADMIN — INSULIN ASPART 1 UNITS: 100 INJECTION, SOLUTION INTRAVENOUS; SUBCUTANEOUS at 08:30

## 2022-02-21 RX ADMIN — SUCRALFATE ORAL 1 G: 1 SUSPENSION ORAL at 06:54

## 2022-02-21 RX ADMIN — SERTRALINE HYDROCHLORIDE 150 MG: 50 TABLET ORAL at 09:02

## 2022-02-21 ASSESSMENT — ACTIVITIES OF DAILY LIVING (ADL)
ADLS_ACUITY_SCORE: 3

## 2022-02-21 NOTE — PROGRESS NOTES
A CPAP of  +7 @ 21% was applied to the pt via the mask for sleep apnea. Skin is intact. Pt is tolerating it well. Will continue to monitor and assess the pt's current respiratory status and needs.    RT Rodríguez on 2/21/2022 at 3:35 AM

## 2022-02-21 NOTE — PLAN OF CARE
"A/Ox4 and independent in room. Denies pain. VSS on RA, uses CPAP over night.     /87 (BP Location: Right arm)   Pulse 76   Temp 98.1  F (36.7  C) (Oral)   Resp 18   Ht 1.803 m (5' 11\")   Wt 130 kg (286 lb 9.6 oz)   SpO2 97%   BMI 39.97 kg/m      NS running at 100 ml/hr. Pt had one BM since 2300 last night; brown in color and did not appear to have any bloody/tarry traits. Good urinary output. Tele: SB/SR. Patient likely to discharge today. Will continue with plan of care.                          "

## 2022-02-21 NOTE — DISCHARGE INSTRUCTIONS
The patient has received a copy of the Provation  report the doctor has written and discharge instructions have been discussed with the patient and responsible adult.  All questions were addressed and answered prior to patient discharge.      GI RECOMMENDATIONS:  - Continue pantoprazole 40mg BID for 2 months  - Needs to hold Eliquis for ~4 weeks to allow for ulcer to heal if possible.   - Needs to avoid NSAIDs and aspirin  - Diet: ADAT  - Should have Hgb rechecked with PCP in 1-2 weeks.   - Repeat EGD in 2 months to reassess healing. [facility] will help coordinate.     - Resume balsalazide for ulcerative colitis.

## 2022-02-21 NOTE — PLAN OF CARE
VSS, afeb., LS are clear, 99% on RA. Pt up indep in room, denies pain. Pt has not had BM this shift, Hgb is 7.8 at 2200.Good appetite, and good fluid intake, BG's were 116 & 152. Tele is SR. Plan to discharge home tomorrow after GI consults.

## 2022-02-21 NOTE — PROGRESS NOTES
"[facility] Progress Note     IMPRESSION:  62 yo male with PMH of well-controlled ulcerative colitis, A fib on anticoagulation, DM type 2 who was admitted on 22 for melena and anemia.     1. Anemia, melena  - EGD 22 - with hiatal hernia, benign-appearing esophageal stenosis, gastrojejunal anastomosis with non bleeding marginal ulcer.   - Ulcer likely related to NSAID use (ibuprofen 800mg BID daily)  - On IV PPI      RECOMMENDATIONS:  - Continue pantoprazole 40mg BID for 2 months  - Needs to hold Eliquis for ~4 weeks to allow for ulcer to heal if possible.   - Needs to avoid NSAIDs and aspirin  - Diet: ADAT  - Should have Hgb rechecked with PCP in 1-2 weeks.   - Repeat EGD in 2 months to reassess healing. [facility] will help coordinate.    - Resume balsalazide for ulcerative colitis.     Disposition:  - Patient can be discharged today.     Approximately 30 minutes of total time was spent providing patient care, including patient evaluation, reviewing documentation/test results, and     John Boston PA-C  [facility]  [phone]  ________________________________________________________________________      SUBJECTIVE:    No complaints this morning.  Had BM, no black or bloody stools. No abdominal pains.      OBJECTIVE:  /87 (BP Location: Right arm)   Pulse 76   Temp 98.1  F (36.7  C) (Oral)   Resp 18   Ht 1.803 m (5' 11\")   Wt 130 kg (286 lb 9.6 oz)   SpO2 97%   BMI 39.97 kg/m    Temp (24hrs), Av  F (36.7  C), Min:98  F (36.7  C), Max:98.1  F (36.7  C)    Patient Vitals for the past 72 hrs:   Weight   22 2202 130 kg (286 lb 9.6 oz)   22 1510 132.4 kg (291 lb 14.2 oz)       Intake/Output Summary (Last 24 hours) at 2022 0816  Last data filed at 2022 0425  Gross per 24 hour   Intake 360 ml   Output 850 ml   Net -490 ml        PHYSICAL EXAM  GEN: Alert, oriented x3, communicative and in NAD.    LYMPH: No LAD noted.  HRT: RRR  LUNGS: " CTA  ABD:  ND, +BS, no guarding or pain to palpation, no rebound, no HSM.  SKIN: No rash, jaundice or spider angiomata      LABS:  I have reviewed the patient's new clinical lab results:     Recent Labs   Lab Test 02/21/22  0625 02/21/22  0207 02/20/22  2157 02/19/22  0948 02/19/22  0602 02/19/22  0147 02/18/22  1548   WBC 5.5  --   --   --  6.1  --  7.8   HGB 7.9* 7.5* 7.8*   < > 7.8*  7.8*   < > 5.7*   MCV 93  --   --   --  94  --  97     --   --   --  201  --  257   INR  --   --   --   --  1.09  --  1.17*    < > = values in this interval not displayed.     Recent Labs   Lab Test 02/21/22  0625 02/19/22  0602 02/18/22  1548   POTASSIUM 4.2 4.5 4.4   CHLORIDE 109 107 101   CO2 25 26 24   BUN 9 25 34*   CR 0.88 1.17 1.26*   ANIONGAP 4 5 9     Recent Labs   Lab Test 02/19/22  0602 02/19/22  0251 02/18/22  1548 03/03/21  0000 06/20/18  1235   ALBUMIN 2.8*  --  3.0*  --  3.5   BILITOTAL 0.3  --  0.3  --  0.4   ALT 10  --  12 7 26   AST 18  --  14 15 23   PROTEIN  --  Negative  --   --   --    LIPASE  --   --  193  --  130         IMAGING:  EGD 2/20/22:  Impression:               - Gastric bypass with a pouch 6 cm in length and                             intact staple line. Gastrojejunal anastomosis                             characterized by non bleding marginal ulcer.                             - Normal examined jejunum.                             - 2 cm hiatal hernia.                             - Z-line regular, 40 cm from the incisors.                             - Benign-appearing esophageal stenosis.                             - No specimens collected.

## 2022-02-21 NOTE — DISCHARGE SUMMARY
[facility]  Discharge Summary  Name: Fer Myles    MRN: [id number]  YOB: 1960    Age: 61 year old  Date of Discharge:  2/21/2022 10:58 AM  Date of Admission: 2/18/2022  Primary Care Provider: Gonzalez Mancuso  Discharge Physician:  Soham Amanda M.D  Discharging Service:  Hospitalist      Discharge Diagnosis:  Hemorrhagic shock secondary to acute blood loss anemia.  Acute blood loss anemia secondary to upper GI bleeding  Lactic acidosis secondary to severe anemia.  Acute kidney injury suspected prerenal secondary to volume loss.  Incidental finding-pulmonary nodule and inguinal gpznkiyyxzeehli-lusk-th as an outpatient     Other Diagnosis:  Hypertension  Ulcerative colitis  Proximal atrial fibrillation  Obstructive sleep apnea on CPAP  Pulmonary nodule.       Discharge Disposition:  Discharged to home     Allergies:  Allergies   Allergen Reactions     Amoxicillin Rash        Discharge Medications:   Discharge Medication List as of 2/21/2022 10:18 AM      START taking these medications    Details   acetaminophen (TYLENOL) 325 MG tablet Take 2 tablets (650 mg) by mouth every 6 hours as needed for mild pain or other (and adjunct with moderate or severe pain or per patient request), Disp-60 tablet, R-1, No Print Out      pantoprazole (PROTONIX) 40 MG EC tablet Take 1 tablet (40 mg) by mouth 2 times daily (before meals), Disp-60 tablet, R-1, E-Prescribe      sucralfate (CARAFATE) 1 GM/10ML suspension Take 10 mLs (1 g) by mouth 3 times daily (before meals), Disp-900 mL, R-0, E-Prescribe         CONTINUE these medications which have CHANGED    Details   apixaban ANTICOAGULANT (ELIQUIS) 5 MG tablet Take 1 tablet (5 mg) by mouth 2 times daily, Disp-180 tablet, R-3, No Print OutHOLD for 30 days and start after discussing with your PCP and GI         CONTINUE these medications which have NOT CHANGED    Details   ACTOS OR Take 45 mg by mouth daily , Historical      balsalazide (COLAZAL) 750 MG  "capsule Take 2,250 mg by mouth 2 times daily, Historical      lisinopril (ZESTRIL) 20 MG tablet Take 20 mg by mouth daily, Historical      potassium chloride ER (KLOR-CON M) 10 MEQ CR tablet Take 10 mEq by mouth daily, Historical      Sodium Bicarbonate-Citric Acid (MATHEUS-SELTZER HEARTBURN PO) Take 2 tablets by mouth 2 times daily as needed, Historical      torsemide (DEMADEX) 20 MG tablet Take 1 tablet by mouth every 24 hours, Historical      ZOLOFT 100 MG OR TABS Take 150 mg by mouth , Historical      ACCU-CHEK GUIDE test strip USE ONE STRIP 2-3 TIMES PER DAY, HARLAN, Historical      blood glucose monitoring (ACCU-CHEK FASTCLIX) lancets USE TO TEST SUGARS ONCE DAILY AS DIRECTED, Historical         STOP taking these medications       amLODIPine (NORVASC) 10 MG tablet Comments:   Reason for Stopping:         ibuprofen (ADVIL/MOTRIN) 200 MG capsule Comments:   Reason for Stopping:                Condition on Discharge:  Discharge condition: Fair   Discharge vitals: Blood pressure 133/83, pulse 66, temperature 98  F (36.7  C), temperature source Oral, resp. rate 18, height 1.803 m (5' 11\"), weight 130 kg (286 lb 9.6 oz), SpO2 99 %.   Code status on discharge: Full Code     History of Illness:  See detailed admission note for full details.    Significant Physical Exam Findings:  General Appearance:   Alert and oriented, not in any distress.  Respiratory:  Good air entry bilaterally, no wheezing crackles or rales.  Cardiovascular: S1-S2 well heard, no gallop or murmur.  GI: Nontender, nondistended, positive bowel sounds, mid surgical scar on the abdomen noted  Skin: No break  Other:  No leg edema       Procedures other than Imaging:  EGD     Imaging:  Results for orders placed or performed during the hospital encounter of 02/18/22   CT Aortic Survey w Contrast    Narrative    CT AORTIC SURVEY W CONTRAST  2/18/2022 4:41 PM    HISTORY:  Abdominal pain, aortic dissection suspected    TECHNIQUE: CT scan obtained of the chest, " abdomen, and pelvis with IV  contrast. Post contrast scanning performed during the arterial phase.  CT chest also obtained without IV contrast. 80mL Isovue-370 IV  injected. Sagittal and coronal reformatted images acquired from the  source post contrast data. Radiation dose for this scan was reduced  using automated exposure control, adjustment of the mA and/or kV  according to patient size, or iterative reconstruction technique.    COMPARISON:  Chest x-ray on 10/5/2021    FINDINGS:  Thoracic and abdominal aorta: Suboptimal opacification of the thoracal  abdominal aorta. No evidence of thoracoabdominal aortic aneurysm or  dissection. Mild atherosclerotic vascular calcification of the  abdominal aorta.    Other vascular: The major branches of the thoracoabdominal aorta are  patent. Mild ultrasonographic calcification of the major branches of  the abdominal aorta. Moderate diffuse orogastric calcification of the  coronary arteries.    Chest: No cardiomegaly or significant pericardial effusion. No  significant mediastinal, hilar or axillary lymphadenopathy.    Lung/pleura: Bilateral basilar pulmonary opacities, likely  atelectasis. Scattered pulmonary nodules including 7 mm nodule in the  lingula (series 7 image 169).    Abdomen/pelvis:    Hepatobiliary: Right upper quadrant post cholecystectomy clips. No  suspicious focal hepatic lesion.    Pancreas: No main pancreatic ductal dilatation or definite solid  pancreatic mass.    Spleen: No splenomegaly.    Adrenal glands: No adrenal nodules.    Kidneys: No radiodense kidney/ureteral stones or hydronephrosis in  either kidney.    Bowel: No abnormally dilated bowel loops. The appendix is visualized  and appears normal. Post surgical changes of gastric bypass.    Peritoneum: No significant abdominal or pelvic lymphadenopathy.    Pelvic organs: Unremarkable.    Lymph nodes: Multiple enlarged inguinal lymph nodes including for  example 1.9 cm from the axis right inguinal node,  indeterminate, could  be reactive or neoplastic.    Bones and soft tissue: Multilevel degenerative changes of the spine  most prominent at L4-5 with grade 1 anterolisthesis of L4 on L5. 1.7  cm chronic focus in the right femoral neck area (series 6 image 287),  indeterminate, could represent bone island.      Impression    IMPRESSION:   1. Although the thoracoabdominal aorta is suboptimally opacified,  there is no evidence of thoracoabdominal aortic aneurysm or  dissection.  2. No acute pathology in the chest, abdomen and pelvis.  3. Moderate atherosclerotic vascular calcification of the coronary  arteries.  4. Few scattered pulmonary nodules including 7 mm nodule in the  lingula, as per Fleischner's society criteria, recommend follow-up  exam in 6-12 month radiographic a chest CT assess for interval  stability  5. Multiple enlarged inguinal lymph nodes including 1.9 cm short axis  right inguinal node, indeterminant, could be reactive or neoplastic.    LETICIA SOUZA MD         SYSTEM ID:  [id number]   XR Chest 2 Views    Narrative    EXAM: XR CHEST 2 VW  LOCATION: [facility]  DATE/TIME: 2/19/2022 8:26 AM    INDICATION: hypotension  COMPARISON: 10/5/2021      Impression    IMPRESSION: Baseline hyperinflation of the lungs. No new consolidation, pleural effusions, or pneumothorax. Unchanged cardiac size.        Consultations:  Consultation during this admission received from gastroenterology.     Recent Lab Results:  Recent Labs   Lab 02/21/22  0944 02/21/22  0625 02/21/22  0207 02/19/22  0948 02/19/22  0602 02/19/22  0147 02/18/22  1548   WBC  --  5.5  --   --  6.1  --  7.8   HGB 9.0* 7.9* 7.5*   < > 7.8*  7.8*   < > 5.7*   HCT  --  25.1*  --   --  24.5*  --  18.5*   MCV  --  93  --   --  94  --  97   PLT  --  230  --   --  201  --  257    < > = values in this interval not displayed.     Recent Labs   Lab 02/21/22  0828 02/21/22  0625 02/21/22  0426 02/19/22  0605 02/19/22  0602  02/18/22  2236 02/18/22  1548   NA  --  138  --   --  138  --  134   POTASSIUM  --  4.2  --   --  4.5  --  4.4   CHLORIDE  --  109  --   --  107  --  101   CO2  --  25  --   --  26  --  24   ANIONGAP  --  4  --   --  5  --  9   * 159* 139*   < > 183*   < > 155*   BUN  --  9  --   --  25  --  34*   CR  --  0.88  --   --  1.17  --  1.26*   GFRESTIMATED  --  >90  --   --  70  --  64   RENATE  --  8.7  --   --  8.4*  --  8.7    < > = values in this interval not displayed.     Recent Labs   Lab 02/21/22  0828 02/21/22  0625 02/21/22  0426 02/21/22  0022 02/20/22  2047   * 159* 139* 134* 152*          Pending Results:    Unresulted Labs Ordered in the Past 30 Days of this Admission     Date and Time Order Name Status Description    2/18/2022  4:09 PM Blood Culture Arm, Right Preliminary     2/18/2022  4:09 PM Blood Culture Arm, Right Preliminary               Reason for your hospital stay    Upper GI bleeding, hemorrhagic shock.     Follow-up and recommended labs and tests     Follow up with primary care provider, Gonzalez Mancuso, within 7 days for hospital follow- up.  The following labs/tests are recommended: CBC, BMP 1 week, result to PCP>  Follow up with GI as instructed..     Activity    Your activity upon discharge: activity as tolerated     Diet    Follow this diet upon discharge: Orders Placed This Encounter      Diet Regular       Hospital Course:  61-year-old male who has history of ulcerative colitis, previous history of gastric bypass and bleeding anastomotic ulcer in 2009.  He is also on apixaban for atrial fibrillation and takes ibuprofen 800 mg twice a day.  He presented with black stools for 4 to 5 days and found to have acute upper GI bleeding with hemorrhagic shock and lactic acidosis and admitted to the hospital.  Upon arrival to the emergency his blood pressure was 98/66 with heart rate of 73, hemoglobin of 5.7 and lactate 3.7.     Plan   1.  Acute blood loss anemia with hemorrhagic shock  and lactic acidosis secondary to upper GI bleed.   -Patient required aggressive resuscitation with IV fluids and also with blood transfusion.  -He was transfused with 3 units of packed red blood, hemoglobin is 9 today.  -On presentation hemoglobin was 5.7, seems to be appropriately increased for the number of units given to the patient. EGD showed nonbleeding gastrojejunal anastomotic ulcer.  Patient was on Protonix IV changed to Protonix 40 mg twice a day, he will be on Carafate 1 g 3 times a day for 1 month.  Patient should be off his apixaban for 1 month and also discontinued ibuprofen.  He was advised not to be on any nonsteroidal anti-inflammatory drugs.    2.  Essential hypertension.  Prior to admission on amlodipine, lisinopril and torsemide.    These medications were held, upon discharge discontinued amlodipine, and restarted his lisinopril and torsemide.    3.  Ulcerative colitis.  On balsalazide at home, and is resumed on discharge.    4.  Acute kidney injury secondary to prerenal/dehydration and blood loss   -Creatinine on presentation was 1.26, baseline is 1.0.  -Today it is 0.88    5.  Diabetes mellitus, type II.  Continue sliding scale.  Prior to admission on Actos.    6.  Paroxysmal atrial fibrillation.  Apixaban will be held for 1 month due to GI bleed.  Currently in sinus rhythm.    7.  Sleep apnea.  Continue CPAP.    8.  Incidental finding: Pulmonary nodule, inguinal lymphadenopathy.  This was noticed on CT scan done on admission, further work-up is needed as an outpatient with primary care provider soon after discharge. This finding was discussed with patient on admission by Dr. Dillard and patient is aware of these abnormalities that need further attention, in outpatient setting.  I reviewed see chart and called Matias to remind him to follow-up with primary care provider regarding this and left him a voice message.  .  I discussed with patient the plan of discharge and follow-up as an outpatient.   All his question and concerns addressed showed understanding.          Total time spent in face to face contact with the patient and coordinating discharge was:  >30 Minutes.

## 2022-02-21 NOTE — PLAN OF CARE
Goal Outcome Evaluation:Stable Hgb     VSS afebrile. Hgb 9.0. brown BM. Pt had EGD yesterday see results. Plan for Discharge. Went over discharge instructions with pt. Questions asked and answered. Pt verbalized understanding. Pt to  medications at own pharmacy. Pt discharge at 1100 per wc with all belongings and paperwork.

## 2022-02-22 ENCOUNTER — PATIENT OUTREACH (OUTPATIENT)
Dept: CARE COORDINATION | Facility: CLINIC | Age: 62
End: 2022-02-22
Payer: COMMERCIAL

## 2022-02-22 DIAGNOSIS — Z71.89 OTHER SPECIFIED COUNSELING: ICD-10-CM

## 2022-02-22 ASSESSMENT — ACTIVITIES OF DAILY LIVING (ADL): DEPENDENT_IADLS:: INDEPENDENT

## 2022-02-22 NOTE — LETTER
[facility]  February 22, 2022    Fer Myles  [address] [place] [address]      Dear Fer,    I am a clinic care coordinator who works with Gonzalez Mancuso MD at [facility]. I wanted to thank you for spending the time to talk with me.  Below is a description of clinic care coordination and how I can further assist you.      The clinic care coordination team is made up of a registered nurse,  and community health worker who understand the health care system. The goal of clinic care coordination is to help you manage your health and improve access to the health care system in the most efficient manner. The team can assist you in meeting your health care goals by providing education, coordinating services, strengthening the communication among your providers and supporting you with any resource needs.    Please feel free to contact me at [phone] with any questions or concerns. We are focused on providing you with the highest-quality healthcare experience possible and that all starts with you.     Sincerely,     KESHA Saenz  , Care Coordination  [facility]  [phone]  [email or web address]

## 2022-02-22 NOTE — LETTER
Patient Centered Plan of Care  About Me:        Patient Name:  Fer Myles    YOB: 1960  Age:         61 year old   [place] MRN:    [id number] Telephone Information:  Home Phone [phone]   Mobile [phone]       Address:  [address] [place] [address] Email address:  [email or web address]      Emergency Contact(s)    Name Relationship Lgl Grd Work Phone Home Phone Mobile Phone   SUHA NOBLES Sister    [phone]           Primary language:  English     needed? No   [employer]:  [phone] op. 1  Other communication barriers:None    Preferred Method of Communication:     Current living arrangement: I live alone    Mobility Status/ Medical Equipment: Independent        Health Maintenance  Health Maintenance Reviewed: Up to date      My Access Plan  Medical Emergency 911   Primary Clinic Line [facility] [phone]   24 Hour Appointment Line [phone] or  [phone]) (toll-free)   24 Hour Nurse Line [phone] (toll-free)   Preferred Urgent Care No data recorded   Preferred Hospital No data recorded   Preferred Pharmacy -[id number] - [place], [place] - [address] [facility]     Behavioral Health Crisis Line The National Suicide Prevention Lifeline at 1-627.328.4154 or 912             My Care Team Members  Patient Care Team       Relationship Specialty Notifications Start End    Gonzalez Mancuso MD PCP - General Family Medicine  3/4/21     Phone: [phone] Fax: [phone] [address] [place] [address]    Eren Rg MD Assigned Heart and Vascular Provider   3/17/21     Phone: [phone] Fax: [phone] [address] [place] [address]    Val Viveros, MELANIEW Lead Care Coordinator   2/22/22             My Care Plans  Self Management and Treatment Plan  Goals and (Comments)  Goals        General     1Medication 1 (pt-stated)      Notes - Note created  2/22/2022  9:52 AM  by Val Viveros, [facility] [place]     Goal Statement: I will utilize [facility] supports to best understand my medications and use them appropriately by 4/22  Date Goal set: 2/22/22  Barriers: None  Strengths: Willingness to learn  Date to Achieve By: 4/22  Patient expressed understanding of goal: Yes  Action steps to achieve this goal:  1. I will ( [facility] ) will make referral to [facility]  2. I will follow up with [facility] staff  3. I will advocate for my needs and ask questions as they arise.             Action Plans on File:                       Advance Care Plans/Directives Type:   No data recorded    My Medical and Care Information  Problem List   Patient Active Problem List   Diagnosis     paroxysmal atrial fib     Alcohol abuse     Hypertension     Morbid obesity (H)     Melena     Gastrointestinal hemorrhage, unspecified gastrointestinal hemorrhage type      Current Medications and Allergies:  See printed Medication Report.    Care Coordination Start Date: No linked episodes   Frequency of Care Coordination: weekly     Form Last Updated: 02/22/2022

## 2022-02-22 NOTE — PROGRESS NOTES
Clinic Care Coordination Contact    Clinic Care Coordination Contact  OUTREACH    Referral Information:  Referral Source: IP Report    Primary Diagnosis: Diabetes    Chief Complaint   Patient presents with     Clinic Care Coordination - Post Hospital        [place] Utilization:   Clinic Utilization  Difficulty keeping appointments:: No  Compliance Concerns: No  No PCP office visit in Past Year: No  Utilization    Hospital Admissions  1             ED Visits  2             No Show Count (past year)  3                Current as of: 2/22/2022  8:41 AM              Clinical Concerns:  Current Medical Concerns:   Hemorrhagic shock secondary to acute blood loss anemia.  Acute blood loss anemia secondary to upper GI bleeding  Lactic acidosis secondary to severe anemia.  Acute kidney injury suspected prerenal secondary to volume loss.  Incidental finding-pulmonary nodule and inguinal jlqotxztxoasuoq-waiu-xi as an outpatient      Other Diagnosis:  Hypertension  Ulcerative colitis  Proximal atrial fibrillation  Obstructive sleep apnea on CPAP  Pulmonary nodule.       Current Behavioral Concerns: Depression   Education Provided to patient: referral to MTM  Pain  Pain (GOAL):: No  Health Maintenance Reviewed: Up to date  Clinical Pathway: None    Medication Management:  Medication review status: Medications reviewed.  Changes noted per patient report.       Functional Status:  Dependent ADLs:: Independent  Dependent IADLs:: Independent  Bed or wheelchair confined:: No  Mobility Status: Independent  Fallen 2 or more times in the past year?: No  Any fall with injury in the past year?: No    Living Situation:  Current living arrangement:: I live alone  Type of residence:: Apartment    Lifestyle & Psychosocial Needs:    Social Determinants of Health     Tobacco Use: Low Risk      Smoking Tobacco Use: Never Smoker     Smokeless Tobacco Use: Never Used   Alcohol Use: Not on file   Financial Resource Strain: Not on file   Food  Insecurity: Not on file   Transportation Needs: Not on file   Physical Activity: Not on file   Stress: Not on file   Social Connections: Not on file   Intimate Partner Violence: Not on file   Depression: Not on file   Housing Stability: Not on file     Diet:: Diabetic diet  Inadequate nutrition (GOAL):: No  Tube Feeding: No  Inadequate activity/exercise (GOAL):: No  Significant changes in sleep pattern (GOAL): No  Transportation means:: Regular car     Jain or spiritual beliefs that impact treatment:: No  Mental health DX:: No  Informal Support system:: Family, Friends             Resources and Interventions:  Current Resources:      Community Resources: None  Supplies used at home:: None  Equipment Currently Used at Home: none  Employment Status: retired         Advance Care Plan/Directive  Advanced Care Plans/Directives on file:: No    Referrals Placed: MTM     Goals:   Goals        General     1Medication 1 (pt-stated)      Notes - Note created  2/22/2022  9:52 AM by Val Viveros LSW     Goal Statement: I will utilize MTM supports to best understand my medications and use them appropriately by 4/22  Date Goal set: 2/22/22  Barriers: None  Strengths: Willingness to learn  Date to Achieve By: 4/22  Patient expressed understanding of goal: Yes  Action steps to achieve this goal:  1. I will ( [facility] ) will make referral to MTM  2. I will follow up with MTM staff  3. I will advocate for my needs and ask questions as they arise.            Patient/Caregiver understanding:Yes    Outreach Frequency: weekly      Plan:Outreach call to pt. To check in after his recent hospitalization for GI bleed. Pt. Is now home and has a follow up appt. With PCP on 3/14. Pt. States he has had some medication changes made in the hospital. [facility] discussed a MTM referral to have them discuss medications and changes as he expressed questioning about having to take Eliquis or not.   Pt. States that overall, he is feeling better but is  still weak. Pt. Lives alone with his 13 year old dog. He is retired . He feels supported by his family and speaks to them often and states he has a friend nearby he can reach out to if need be. [facility] informed Pt. That i'd outreach to him weekly to ensure he is okay and that any/all needs are being met. Pt. Was [place] so this writer will call again in 5-7 days. Pt. Has this writers number to call if a  Need arises.

## 2022-02-22 NOTE — PROGRESS NOTES
Clinic Care Coordination Contact  [facility]/Voicemail       Clinical Data: Care Coordinator Outreach  Outreach attempted x 1.  Left message on patient's voicemail with call back information and requested return call.   Care Coordinator will try to reach patient again in 1-2 business days.

## 2022-02-23 LAB
BACTERIA BLD CULT: NO GROWTH
BACTERIA BLD CULT: NO GROWTH

## 2022-02-25 ENCOUNTER — TELEPHONE (OUTPATIENT)
Dept: CARE COORDINATION | Facility: CLINIC | Age: 62
End: 2022-02-25
Payer: COMMERCIAL

## 2022-02-25 NOTE — TELEPHONE ENCOUNTER
MTM referral from: [facility] visit (referral by provider)    MTM referral outreach attempt #2 on February 25, 2022 at 5:14 PM      Outcome: Patient declined. Pt states he does not need this appt, will route to MTM Pharmacist/Provider as an FYI. Thank you for the referral.     Zaire Liz, MTM coordinator

## 2022-02-28 NOTE — TELEPHONE ENCOUNTER
Noted - thank you!    Jolanta Cartagena, PharmD, [facility]  Medication Therapy Management Pharmacist  Pager: [phone]

## 2022-03-09 ENCOUNTER — PATIENT OUTREACH (OUTPATIENT)
Dept: CARE COORDINATION | Facility: CLINIC | Age: 62
End: 2022-03-09
Payer: COMMERCIAL

## 2022-03-29 ENCOUNTER — PATIENT OUTREACH (OUTPATIENT)
Dept: CARE COORDINATION | Facility: CLINIC | Age: 62
End: 2022-03-29
Payer: COMMERCIAL

## 2022-03-29 NOTE — PROGRESS NOTES
Clinic Care Coordination Contact  [facility]/Voicemail       Clinical Data: Care Coordinator Outreach  Outreach attempted x 2.  Left message on patient's voicemail with call back information and requested return call.   Care Coordinator will try to reach patient again in 3-5 business days.

## 2022-04-20 ENCOUNTER — PATIENT OUTREACH (OUTPATIENT)
Dept: CARE COORDINATION | Facility: CLINIC | Age: 62
End: 2022-04-20
Payer: COMMERCIAL

## 2022-04-20 NOTE — PROGRESS NOTES
Clinic Care Coordination Contact  [facility]/Voicemail       Clinical Data: Care Coordinator Outreach  Outreach attempted x 3.  Left message on patient's voicemail with call back information and requested return call.  . Care Coordinator will do no further outreaches at this time.

## 2022-04-20 NOTE — PROGRESS NOTES
Clinic Care Coordination Contact    Follow Up Progress Note      Assessment: PC from Matias. He states he's doing better and is not bleeding internally anymore, His hemoglobin is still quite low and he finds himself tired more often than not.He is working closely with PCP on this. Pt. Expressed no additional needs or concerns at this time so [facility] will close out CC.    Care Gaps:    Health Maintenance Due   Topic Date Due     PREVENTIVE CARE VISIT  Never done     A1C  Never done     LIPID  Never done     MICROALBUMIN  Never done     DIABETIC FOOT EXAM  Never done     ADVANCE CARE PLANNING  Never done     EYE EXAM  Never done     Pneumococcal Vaccine: Pediatrics (0 to 5 Years) and At-Risk Patients (6 to 64 Years) (1 of 2 - PPSV23) Never done     HIV SCREENING  Never done     HEPATITIS C SCREENING  Never done     ZOSTER IMMUNIZATION (1 of 2) Never done     PHQ-2 (once per calendar year)  Never done     COVID-19 Vaccine (4 - Booster for Moderna series) 02/16/2022           Goals addressed this encounter:    Goals Addressed    None         Intervention/Education provided during outreach:               Plan:   Continue with plan of care from pcp  Care Coordinator will close out CC.

## 2022-06-11 ENCOUNTER — HEALTH MAINTENANCE LETTER (OUTPATIENT)
Age: 62
End: 2022-06-11

## 2022-10-16 ENCOUNTER — HEALTH MAINTENANCE LETTER (OUTPATIENT)
Age: 62
End: 2022-10-16

## 2022-10-25 DIAGNOSIS — I48.0 PAROXYSMAL ATRIAL FIBRILLATION (H): ICD-10-CM

## 2022-10-25 NOTE — TELEPHONE ENCOUNTER
Received refill request for:  Eliquis  Last OV was: 12/29/21 Dr. Rg  Labs/EKG: Hgb 2/2022- pt admitted for ulcer, told to hold Eliquis x 4 weeks, and restart after discussion with PCP and GI, no repeat Hgb; PCP at [facility], records not in EPIC  F/U scheduled: Orders for 12/2022 with EP  Pharmacy: [facility] Refill Guideline reviewed.  Medication does not meet criteria for refill due to abnormal labs, will send to nursing team to review if ok to fill.  Messaged to providers team for further review.     Maricruz Bell, RN, BSN  10/25/22 at 9:27 AM

## 2022-10-26 NOTE — TELEPHONE ENCOUNTER
10/226/22 Attempted to call pt, reached VM and requesting callback. Called [facility] and spoke w  who will fax OV w Dr Mancuso from March and July and CBC from July. No refill sent at this time.Nuria 245 pm

## 2022-10-27 NOTE — TELEPHONE ENCOUNTER
"10/272  Spoke w STEFANO who stated pt was last seen for UGI at [facility] on 2/20/22 where \" evidence of gastric bypass marginal ulcer \" was noted. Eliquis hold x 4 weeks recommended. No follow up was done by pt. Nuria 1130 am  "

## 2022-10-27 NOTE — TELEPHONE ENCOUNTER
10/27/22 Another attempt to reach pt, reached  and requested callback. Records recd from PCP Dr Mancuso where it was recommended pt f/u w [facility] to determine when Eliquis could be restarted. Left detailed message on [facility] medical records VM requesting any records from Spring 2022 be faxed to Alyssa XIONG at [phone]  [facility]

## 2022-10-31 ENCOUNTER — ANESTHESIA (OUTPATIENT)
Dept: SURGERY | Facility: CLINIC | Age: 62
End: 2022-10-31
Payer: COMMERCIAL

## 2022-10-31 ENCOUNTER — APPOINTMENT (OUTPATIENT)
Dept: GENERAL RADIOLOGY | Facility: CLINIC | Age: 62
End: 2022-10-31
Attending: ORTHOPAEDIC SURGERY
Payer: COMMERCIAL

## 2022-10-31 ENCOUNTER — ANESTHESIA EVENT (OUTPATIENT)
Dept: SURGERY | Facility: CLINIC | Age: 62
End: 2022-10-31
Payer: COMMERCIAL

## 2022-10-31 ENCOUNTER — HOSPITAL ENCOUNTER (OUTPATIENT)
Facility: CLINIC | Age: 62
Setting detail: OBSERVATION
Discharge: HOME | End: 2022-10-31
Attending: EMERGENCY MEDICINE | Admitting: HOSPITALIST
Payer: COMMERCIAL

## 2022-10-31 ENCOUNTER — APPOINTMENT (OUTPATIENT)
Dept: GENERAL RADIOLOGY | Facility: CLINIC | Age: 62
End: 2022-10-31
Attending: EMERGENCY MEDICINE
Payer: COMMERCIAL

## 2022-10-31 VITALS
BODY MASS INDEX: 37.66 KG/M2 | TEMPERATURE: 97.8 F | SYSTOLIC BLOOD PRESSURE: 115 MMHG | HEART RATE: 98 BPM | RESPIRATION RATE: 16 BRPM | OXYGEN SATURATION: 98 % | WEIGHT: 270 LBS | DIASTOLIC BLOOD PRESSURE: 80 MMHG

## 2022-10-31 DIAGNOSIS — S43.015A ANTERIOR SHOULDER DISLOCATION, LEFT, INITIAL ENCOUNTER: Primary | ICD-10-CM

## 2022-10-31 LAB
ANION GAP SERPL CALCULATED.3IONS-SCNC: 14 MMOL/L (ref 7–15)
BASOPHILS # BLD AUTO: 0 10E3/UL (ref 0–0.2)
BASOPHILS NFR BLD AUTO: 0 %
BUN SERPL-MCNC: 28 MG/DL (ref 8–23)
CALCIUM SERPL-MCNC: 9.1 MG/DL (ref 8.8–10.2)
CHLORIDE SERPL-SCNC: 94 MMOL/L (ref 98–107)
CREAT SERPL-MCNC: 1.89 MG/DL (ref 0.67–1.17)
DEPRECATED HCO3 PLAS-SCNC: 24 MMOL/L (ref 22–29)
EOSINOPHIL # BLD AUTO: 0.1 10E3/UL (ref 0–0.7)
EOSINOPHIL NFR BLD AUTO: 1 %
ERYTHROCYTE [DISTWIDTH] IN BLOOD BY AUTOMATED COUNT: 15.9 % (ref 10–15)
GFR SERPL CREATININE-BSD FRML MDRD: 39 ML/MIN/1.73M2
GLUCOSE BLDC GLUCOMTR-MCNC: 166 MG/DL (ref 70–99)
GLUCOSE SERPL-MCNC: 166 MG/DL (ref 70–99)
HCT VFR BLD AUTO: 36 % (ref 40–53)
HGB BLD-MCNC: 11.1 G/DL (ref 13.3–17.7)
IMM GRANULOCYTES # BLD: 0.1 10E3/UL
IMM GRANULOCYTES NFR BLD: 1 %
LYMPHOCYTES # BLD AUTO: 0.6 10E3/UL (ref 0.8–5.3)
LYMPHOCYTES NFR BLD AUTO: 7 %
MCH RBC QN AUTO: 31 PG (ref 26.5–33)
MCHC RBC AUTO-ENTMCNC: 30.8 G/DL (ref 31.5–36.5)
MCV RBC AUTO: 101 FL (ref 78–100)
MONOCYTES # BLD AUTO: 0.6 10E3/UL (ref 0–1.3)
MONOCYTES NFR BLD AUTO: 8 %
NEUTROPHILS # BLD AUTO: 6.4 10E3/UL (ref 1.6–8.3)
NEUTROPHILS NFR BLD AUTO: 83 %
NRBC # BLD AUTO: 0 10E3/UL
NRBC BLD AUTO-RTO: 0 /100
PLATELET # BLD AUTO: 253 10E3/UL (ref 150–450)
POTASSIUM SERPL-SCNC: 5.5 MMOL/L (ref 3.4–5.3)
RBC # BLD AUTO: 3.58 10E6/UL (ref 4.4–5.9)
SARS-COV-2 RNA RESP QL NAA+PROBE: NEGATIVE
SODIUM SERPL-SCNC: 132 MMOL/L (ref 136–145)
WBC # BLD AUTO: 7.7 10E3/UL (ref 4–11)

## 2022-10-31 PROCEDURE — G0378 HOSPITAL OBSERVATION PER HR: HCPCS

## 2022-10-31 PROCEDURE — U0005 INFEC AGEN DETEC AMPLI PROBE: HCPCS | Performed by: EMERGENCY MEDICINE

## 2022-10-31 PROCEDURE — 999N000063 XR SHOULDER LEFT PORT 1 VIEW: Mod: LT

## 2022-10-31 PROCEDURE — 73030 X-RAY EXAM OF SHOULDER: CPT | Mod: LT

## 2022-10-31 PROCEDURE — 250N000013 HC RX MED GY IP 250 OP 250 PS 637: Performed by: PHYSICIAN ASSISTANT

## 2022-10-31 PROCEDURE — 99220 PR INITIAL OBSERVATION CARE,LEVEL III: CPT | Performed by: PHYSICIAN ASSISTANT

## 2022-10-31 PROCEDURE — 96376 TX/PRO/DX INJ SAME DRUG ADON: CPT

## 2022-10-31 PROCEDURE — 99152 MOD SED SAME PHYS/QHP 5/>YRS: CPT

## 2022-10-31 PROCEDURE — 999N000157 HC STATISTIC RCP TIME EA 10 MIN

## 2022-10-31 PROCEDURE — 80048 BASIC METABOLIC PNL TOTAL CA: CPT | Performed by: EMERGENCY MEDICINE

## 2022-10-31 PROCEDURE — 999N000141 HC STATISTIC PRE-PROCEDURE NURSING ASSESSMENT: Performed by: ORTHOPAEDIC SURGERY

## 2022-10-31 PROCEDURE — 710N000012 HC RECOVERY PHASE 2, PER MINUTE: Performed by: ORTHOPAEDIC SURGERY

## 2022-10-31 PROCEDURE — 370N000017 HC ANESTHESIA TECHNICAL FEE, PER MIN: Performed by: ORTHOPAEDIC SURGERY

## 2022-10-31 PROCEDURE — 85025 COMPLETE CBC W/AUTO DIFF WBC: CPT | Performed by: EMERGENCY MEDICINE

## 2022-10-31 PROCEDURE — 250N000011 HC RX IP 250 OP 636

## 2022-10-31 PROCEDURE — 250N000011 HC RX IP 250 OP 636: Performed by: NURSE ANESTHETIST, CERTIFIED REGISTERED

## 2022-10-31 PROCEDURE — 258N000003 HC RX IP 258 OP 636: Performed by: NURSE ANESTHETIST, CERTIFIED REGISTERED

## 2022-10-31 PROCEDURE — 258N000003 HC RX IP 258 OP 636: Performed by: ANESTHESIOLOGY

## 2022-10-31 PROCEDURE — 96374 THER/PROPH/DIAG INJ IV PUSH: CPT

## 2022-10-31 PROCEDURE — 999N000065 XR SHOULDER LEFT G/E 3 VIEWS

## 2022-10-31 PROCEDURE — 23650 CLTX SHO DSLC W/MNPJ WO ANES: CPT | Mod: LT,XU

## 2022-10-31 PROCEDURE — 96375 TX/PRO/DX INJ NEW DRUG ADDON: CPT | Mod: XU

## 2022-10-31 PROCEDURE — 360N000082 HC SURGERY LEVEL 2 W/ FLUORO, PER MIN: Performed by: ORTHOPAEDIC SURGERY

## 2022-10-31 PROCEDURE — 82962 GLUCOSE BLOOD TEST: CPT

## 2022-10-31 PROCEDURE — 36415 COLL VENOUS BLD VENIPUNCTURE: CPT | Performed by: EMERGENCY MEDICINE

## 2022-10-31 PROCEDURE — 99285 EMERGENCY DEPT VISIT HI MDM: CPT | Mod: 25

## 2022-10-31 PROCEDURE — 250N000013 HC RX MED GY IP 250 OP 250 PS 637: Performed by: EMERGENCY MEDICINE

## 2022-10-31 PROCEDURE — 250N000009 HC RX 250: Performed by: NURSE ANESTHETIST, CERTIFIED REGISTERED

## 2022-10-31 PROCEDURE — 250N000011 HC RX IP 250 OP 636: Performed by: EMERGENCY MEDICINE

## 2022-10-31 PROCEDURE — 710N000009 HC RECOVERY PHASE 1, LEVEL 1, PER MIN: Performed by: ORTHOPAEDIC SURGERY

## 2022-10-31 RX ORDER — DEXAMETHASONE SODIUM PHOSPHATE 4 MG/ML
INJECTION, SOLUTION INTRA-ARTICULAR; INTRALESIONAL; INTRAMUSCULAR; INTRAVENOUS; SOFT TISSUE PRN
Status: DISCONTINUED | OUTPATIENT
Start: 2022-10-31 | End: 2022-10-31

## 2022-10-31 RX ORDER — SODIUM CHLORIDE, SODIUM LACTATE, POTASSIUM CHLORIDE, CALCIUM CHLORIDE 600; 310; 30; 20 MG/100ML; MG/100ML; MG/100ML; MG/100ML
INJECTION, SOLUTION INTRAVENOUS CONTINUOUS
Status: CANCELLED | OUTPATIENT
Start: 2022-10-31

## 2022-10-31 RX ORDER — NICOTINE POLACRILEX 4 MG
15-30 LOZENGE BUCCAL
Status: CANCELLED | OUTPATIENT
Start: 2022-10-31

## 2022-10-31 RX ORDER — METHOCARBAMOL 500 MG/1
500 TABLET, FILM COATED ORAL ONCE
Status: COMPLETED | OUTPATIENT
Start: 2022-10-31 | End: 2022-10-31

## 2022-10-31 RX ORDER — ONDANSETRON 4 MG/1
4 TABLET, ORALLY DISINTEGRATING ORAL EVERY 6 HOURS PRN
Status: CANCELLED | OUTPATIENT
Start: 2022-10-31

## 2022-10-31 RX ORDER — LIDOCAINE 40 MG/G
CREAM TOPICAL
Status: CANCELLED | OUTPATIENT
Start: 2022-10-31

## 2022-10-31 RX ORDER — LIDOCAINE HYDROCHLORIDE 10 MG/ML
INJECTION, SOLUTION INFILTRATION; PERINEURAL PRN
Status: DISCONTINUED | OUTPATIENT
Start: 2022-10-31 | End: 2022-10-31

## 2022-10-31 RX ORDER — GLYCOPYRROLATE 0.2 MG/ML
INJECTION, SOLUTION INTRAMUSCULAR; INTRAVENOUS PRN
Status: DISCONTINUED | OUTPATIENT
Start: 2022-10-31 | End: 2022-10-31

## 2022-10-31 RX ORDER — SODIUM CHLORIDE, SODIUM LACTATE, POTASSIUM CHLORIDE, CALCIUM CHLORIDE 600; 310; 30; 20 MG/100ML; MG/100ML; MG/100ML; MG/100ML
INJECTION, SOLUTION INTRAVENOUS CONTINUOUS
Status: DISCONTINUED | OUTPATIENT
Start: 2022-10-31 | End: 2022-10-31 | Stop reason: HOSPADM

## 2022-10-31 RX ORDER — ONDANSETRON 2 MG/ML
4 INJECTION INTRAMUSCULAR; INTRAVENOUS EVERY 6 HOURS PRN
Status: CANCELLED | OUTPATIENT
Start: 2022-10-31

## 2022-10-31 RX ORDER — MORPHINE SULFATE 4 MG/ML
4 INJECTION, SOLUTION INTRAMUSCULAR; INTRAVENOUS ONCE
Status: COMPLETED | OUTPATIENT
Start: 2022-10-31 | End: 2022-10-31

## 2022-10-31 RX ORDER — PROPOFOL 10 MG/ML
INJECTION, EMULSION INTRAVENOUS PRN
Status: DISCONTINUED | OUTPATIENT
Start: 2022-10-31 | End: 2022-10-31

## 2022-10-31 RX ORDER — TRAMADOL HYDROCHLORIDE 50 MG/1
50 TABLET ORAL EVERY 6 HOURS PRN
Qty: 10 TABLET | Refills: 0 | Status: SHIPPED | OUTPATIENT
Start: 2022-10-31 | End: 2022-11-03

## 2022-10-31 RX ORDER — SODIUM CHLORIDE, SODIUM LACTATE, POTASSIUM CHLORIDE, CALCIUM CHLORIDE 600; 310; 30; 20 MG/100ML; MG/100ML; MG/100ML; MG/100ML
INJECTION, SOLUTION INTRAVENOUS CONTINUOUS PRN
Status: DISCONTINUED | OUTPATIENT
Start: 2022-10-31 | End: 2022-10-31

## 2022-10-31 RX ORDER — FENTANYL CITRATE 50 UG/ML
INJECTION, SOLUTION INTRAMUSCULAR; INTRAVENOUS PRN
Status: DISCONTINUED | OUTPATIENT
Start: 2022-10-31 | End: 2022-10-31

## 2022-10-31 RX ORDER — NALOXONE HYDROCHLORIDE 0.4 MG/ML
0.2 INJECTION, SOLUTION INTRAMUSCULAR; INTRAVENOUS; SUBCUTANEOUS
Status: DISCONTINUED | OUTPATIENT
Start: 2022-10-31 | End: 2022-10-31 | Stop reason: HOSPADM

## 2022-10-31 RX ORDER — NALOXONE HYDROCHLORIDE 0.4 MG/ML
0.4 INJECTION, SOLUTION INTRAMUSCULAR; INTRAVENOUS; SUBCUTANEOUS
Status: DISCONTINUED | OUTPATIENT
Start: 2022-10-31 | End: 2022-10-31 | Stop reason: HOSPADM

## 2022-10-31 RX ORDER — OXYCODONE HYDROCHLORIDE 5 MG/1
5 TABLET ORAL EVERY 4 HOURS PRN
Status: CANCELLED | OUTPATIENT
Start: 2022-10-31

## 2022-10-31 RX ORDER — PROPOFOL 10 MG/ML
100 INJECTION, EMULSION INTRAVENOUS ONCE
Status: COMPLETED | OUTPATIENT
Start: 2022-10-31 | End: 2022-10-31

## 2022-10-31 RX ORDER — ONDANSETRON 2 MG/ML
INJECTION INTRAMUSCULAR; INTRAVENOUS PRN
Status: DISCONTINUED | OUTPATIENT
Start: 2022-10-31 | End: 2022-10-31

## 2022-10-31 RX ORDER — ACETAMINOPHEN 325 MG/1
650 TABLET ORAL
Status: DISCONTINUED | OUTPATIENT
Start: 2022-10-31 | End: 2022-10-31 | Stop reason: HOSPADM

## 2022-10-31 RX ORDER — KETOROLAC TROMETHAMINE 30 MG/ML
30 INJECTION, SOLUTION INTRAMUSCULAR; INTRAVENOUS EVERY 12 HOURS PRN
Status: CANCELLED | OUTPATIENT
Start: 2022-10-31

## 2022-10-31 RX ORDER — DEXTROSE MONOHYDRATE 25 G/50ML
25-50 INJECTION, SOLUTION INTRAVENOUS
Status: CANCELLED | OUTPATIENT
Start: 2022-10-31

## 2022-10-31 RX ORDER — OXYCODONE HYDROCHLORIDE 5 MG/1
5 TABLET ORAL
Status: COMPLETED | OUTPATIENT
Start: 2022-10-31 | End: 2022-10-31

## 2022-10-31 RX ORDER — HYDROMORPHONE HYDROCHLORIDE 1 MG/ML
.3-.5 INJECTION, SOLUTION INTRAMUSCULAR; INTRAVENOUS; SUBCUTANEOUS EVERY 4 HOURS PRN
Status: DISCONTINUED | OUTPATIENT
Start: 2022-10-31 | End: 2022-10-31 | Stop reason: HOSPADM

## 2022-10-31 RX ORDER — HYDROMORPHONE HYDROCHLORIDE 1 MG/ML
0.5 INJECTION, SOLUTION INTRAMUSCULAR; INTRAVENOUS; SUBCUTANEOUS ONCE
Status: COMPLETED | OUTPATIENT
Start: 2022-10-31 | End: 2022-10-31

## 2022-10-31 RX ORDER — LIDOCAINE 40 MG/G
CREAM TOPICAL
Status: DISCONTINUED | OUTPATIENT
Start: 2022-10-31 | End: 2022-10-31 | Stop reason: HOSPADM

## 2022-10-31 RX ORDER — FENTANYL CITRATE 50 UG/ML
INJECTION, SOLUTION INTRAMUSCULAR; INTRAVENOUS
Status: COMPLETED
Start: 2022-10-31 | End: 2022-10-31

## 2022-10-31 RX ORDER — ACETAMINOPHEN 325 MG/1
975 TABLET ORAL EVERY 8 HOURS
Status: CANCELLED | OUTPATIENT
Start: 2022-10-31

## 2022-10-31 RX ORDER — FLUMAZENIL 0.1 MG/ML
0.2 INJECTION, SOLUTION INTRAVENOUS
Status: DISCONTINUED | OUTPATIENT
Start: 2022-10-31 | End: 2022-10-31 | Stop reason: HOSPADM

## 2022-10-31 RX ORDER — ONDANSETRON 4 MG/1
4 TABLET, ORALLY DISINTEGRATING ORAL
Status: DISCONTINUED | OUTPATIENT
Start: 2022-10-31 | End: 2022-10-31 | Stop reason: HOSPADM

## 2022-10-31 RX ORDER — FENTANYL CITRATE 50 UG/ML
50 INJECTION, SOLUTION INTRAMUSCULAR; INTRAVENOUS ONCE
Status: COMPLETED | OUTPATIENT
Start: 2022-10-31 | End: 2022-10-31

## 2022-10-31 RX ORDER — FENTANYL CITRATE 50 UG/ML
50 INJECTION, SOLUTION INTRAMUSCULAR; INTRAVENOUS
Status: COMPLETED | OUTPATIENT
Start: 2022-10-31 | End: 2022-10-31

## 2022-10-31 RX ADMIN — FENTANYL CITRATE 50 MCG: 50 INJECTION, SOLUTION INTRAMUSCULAR; INTRAVENOUS at 14:09

## 2022-10-31 RX ADMIN — OXYCODONE HYDROCHLORIDE 5 MG: 5 TABLET ORAL at 18:51

## 2022-10-31 RX ADMIN — ROCURONIUM BROMIDE 5 MG: 50 INJECTION, SOLUTION INTRAVENOUS at 17:18

## 2022-10-31 RX ADMIN — LIDOCAINE HYDROCHLORIDE 50 MG: 10 INJECTION, SOLUTION INFILTRATION; PERINEURAL at 17:18

## 2022-10-31 RX ADMIN — HYDROMORPHONE HYDROCHLORIDE 0.5 MG: 1 INJECTION, SOLUTION INTRAMUSCULAR; INTRAVENOUS; SUBCUTANEOUS at 13:38

## 2022-10-31 RX ADMIN — HYDROMORPHONE HYDROCHLORIDE 0.5 MG: 1 INJECTION, SOLUTION INTRAMUSCULAR; INTRAVENOUS; SUBCUTANEOUS at 12:43

## 2022-10-31 RX ADMIN — FENTANYL CITRATE 50 MCG: 50 INJECTION, SOLUTION INTRAMUSCULAR; INTRAVENOUS at 14:35

## 2022-10-31 RX ADMIN — ONDANSETRON HYDROCHLORIDE 4 MG: 2 INJECTION, SOLUTION INTRAVENOUS at 17:21

## 2022-10-31 RX ADMIN — METHOCARBAMOL 500 MG: 500 TABLET ORAL at 12:35

## 2022-10-31 RX ADMIN — MIDAZOLAM 2 MG: 1 INJECTION INTRAMUSCULAR; INTRAVENOUS at 17:15

## 2022-10-31 RX ADMIN — FENTANYL CITRATE 100 MCG: 50 INJECTION, SOLUTION INTRAMUSCULAR; INTRAVENOUS at 17:18

## 2022-10-31 RX ADMIN — DEXAMETHASONE SODIUM PHOSPHATE 4 MG: 4 INJECTION, SOLUTION INTRA-ARTICULAR; INTRALESIONAL; INTRAMUSCULAR; INTRAVENOUS; SOFT TISSUE at 17:21

## 2022-10-31 RX ADMIN — PROPOFOL 200 MG: 10 INJECTION, EMULSION INTRAVENOUS at 17:18

## 2022-10-31 RX ADMIN — SODIUM CHLORIDE, POTASSIUM CHLORIDE, SODIUM LACTATE AND CALCIUM CHLORIDE: 600; 310; 30; 20 INJECTION, SOLUTION INTRAVENOUS at 17:08

## 2022-10-31 RX ADMIN — SODIUM CHLORIDE, POTASSIUM CHLORIDE, SODIUM LACTATE AND CALCIUM CHLORIDE: 600; 310; 30; 20 INJECTION, SOLUTION INTRAVENOUS at 17:05

## 2022-10-31 RX ADMIN — MORPHINE SULFATE 4 MG: 4 INJECTION INTRAVENOUS at 12:11

## 2022-10-31 RX ADMIN — GLYCOPYRROLATE 0.2 MCG: 0.2 INJECTION, SOLUTION INTRAMUSCULAR; INTRAVENOUS at 17:18

## 2022-10-31 RX ADMIN — PROPOFOL 300 MG: 10 INJECTION, EMULSION INTRAVENOUS at 14:35

## 2022-10-31 RX ADMIN — FENTANYL CITRATE 50 MCG: 50 INJECTION, SOLUTION INTRAMUSCULAR; INTRAVENOUS at 14:17

## 2022-10-31 ASSESSMENT — ENCOUNTER SYMPTOMS
SHORTNESS OF BREATH: 0
ARTHRALGIAS: 1

## 2022-10-31 ASSESSMENT — ACTIVITIES OF DAILY LIVING (ADL)
ADLS_ACUITY_SCORE: 35
ADLS_ACUITY_SCORE: 35
ADLS_ACUITY_SCORE: 36
ADLS_ACUITY_SCORE: 35

## 2022-10-31 NOTE — ANESTHESIA POSTPROCEDURE EVALUATION
Patient: Fer Myles    Procedure: Procedure(s):  CLOSED REDUCTION LEFT SHOULDER       Anesthesia Type:  General    Note:  Disposition: Outpatient   Postop Pain Control: Uneventful            Sign Out: Well controlled pain   PONV: No   Neuro/Psych: Uneventful            Sign Out: Acceptable/Baseline neuro status   Airway/Respiratory: Uneventful            Sign Out: Acceptable/Baseline resp. status   CV/Hemodynamics: Uneventful            Sign Out: Acceptable CV status; No obvious hypovolemia; No obvious fluid overload   Other NRE: NONE   DID A NON-ROUTINE EVENT OCCUR? No           Last vitals:  Vitals Value Taken Time   /55 10/31/22 1750   Temp 97.4  F (36.3  C) 10/31/22 1745   Pulse 97 10/31/22 1755   Resp 14 10/31/22 1755   SpO2 94 % 10/31/22 1757   Vitals shown include unvalidated device data.    Electronically Signed By: Mukesh Hare MD  October 31, 2022  5:58 PM

## 2022-10-31 NOTE — ED PROVIDER NOTES
History   Chief Complaint:  Shoulder Injury       The history is provided by the patient.      Fer Myles is a 62 year old male on Eliquis for Afib  with history of hypertension, and diabetes who presents with left shoulder injury. Patient reports that he tripped over his dog last night around 2030 and heard a pop with associated, immediate left shoulder pain. This morning, the patient reports left hand numbness. He denies head injury or loss of consciousness.     Review of Systems   Respiratory: Negative for shortness of breath.    Musculoskeletal: Positive for arthralgias.   Neurological: Negative for syncope.   All other systems reviewed and are negative.    Allergies:  Amoxicillin    Medications:  Actos   Eliquis  Balsalazide   Lisinopril   Pantoprazole   Potassium chloride   Sodium bicarbonate- citric acid  Torsemide  Zoloft     Past Medical History:     Paroxysmal atrial fib   Alcohol abuse  Hypertension   Morbid obesity   Melena   Gastrointestinal hemorrhage  Diabetes mellitus type 2    Past Surgical History:    Gastric bypass   Subdural hematoma     Social History:  Presents via   PCP: Gonzalez Mancuso     Physical Exam     Patient Vitals for the past 24 hrs:   BP Temp Temp src Pulse Resp SpO2 Weight   10/31/22 1440 116/87 -- -- 90 20 96 % --   10/31/22 1430 -- -- -- -- -- 97 % --   10/31/22 1430 -- -- -- 91 -- -- --   10/31/22 1415 -- -- -- -- -- 99 % --   10/31/22 1404 113/67 98.1  F (36.7  C) Oral 88 18 97 % --   10/31/22 1400 113/67 -- -- -- -- -- --   10/31/22 1230 120/83 -- -- 80 -- -- --   10/31/22 1215 131/75 -- -- 76 -- 97 % --   10/31/22 1213 -- -- -- 74 18 99 % --   10/31/22 1015 -- -- -- -- -- -- 122.5 kg (270 lb)   10/31/22 1013 123/79 97  F (36.1  C) Temporal 74 18 100 % --       Physical Exam  Constitutional: Vital signs reviewed as above.   Head: No external signs of trauma noted.  Eyes: Pupils are equal, round, and reactive to light.   Neck: No JVD noted  Cardiovascular: normal rate,  Regular rhythm and normal heart sounds.  No murmur heard. Equal B/L peripheral pulses.  Pulmonary/Chest: Effort normal and breath sounds normal. No respiratory distress. Patient has no wheezes. Patient has no rales.   Gastrointestinal: Soft. There is no tenderness.   Musculoskeletal/Extremities: No pitting edema noted.  The left shoulder appears slightly inferior compared with the right.  There is some degree of a gap I can palpate underneath the acromion.  The patient has decreased range of motion and notable tenderness with shoulder movement.  Neurological: Patient is alert and oriented to person, place, and time.  The patient has decreased sensation on the left fifth finger and the ulnar aspect of the left fourth finger.  Skin: Skin is warm and dry. There is no diaphoresis noted.   Psychiatric: The patient appears calm.      Emergency Department Course   Imaging:  XR Shoulder Left G/E 3 Views   Final Result   IMPRESSION:   1.  Anteroinferior dislocation of the left glenohumeral joint.   2.  No definitive acute fracture is evident.   3.  Hypertrophic degenerative changes in the glenohumeral joint.   4.  Old healed fractures of multiple left ribs.      MARVA CASTREJON MD            SYSTEM ID:  [id number]      XR Shoulder Left G/E 3 Views    (Results Pending)     Report per radiology     [facility]    -Dislocation - Upper Extremity    Date/Time: 10/31/2022 1:54 PM  Performed by: Abdulaziz Conrad DO  Authorized by: Abdulaziz Conrad DO     Emergent condition/consent implied    ED EVALUATION:      Assessment Time: 10/31/2022 11:55 AM      I have performed an Emergency Department Evaluation including taking a history and physical examination, this evaluation will be documented in the electronic medical record for this ED encounter.     Indication: left shoulder dislocation    ASA Class: Class 3- Severe systemic disease, definite functional limitations    Mallampati: Grade 2- soft palate,  base of uvula, tonsillar pillars, and portion of posterior pharyngeal wall visible    NPO Status: appropriately NPO for procedure    UNIVERSAL PROTOCOL   Site Marked: NA  Prior Images Obtained and Reviewed:  NA  Required items: Required blood products, implants, devices and special equipment available    Patient identity confirmed:  Verbally with patient, arm band, provided demographic data and hospital-assigned identification number  Patient was reevaluated immediately before administering moderate or deep sedation or anesthesia  Confirmation Checklist:  Patient's identity using two indicators, relevant allergies, procedure was appropriate and matched the consent or emergent situation and correct equipment/implants were available  Time out: Immediately prior to the procedure a time out was called    Universal Protocol: the Joint Commission Universal Protocol was followed      LOCATION     Location:  Shoulder    Shoulder location:  L shoulder    Shoulder dislocation type: anterior      Chronicity:  New    PRE PROCEDURE ASSESSMENT      Pre-procedure imaging:  X-ray    Imaging findings: dislocation present      Imaging findings: no fracture      Fracture of greater humeral tuberosity: no      Hill-Sachs deformity: no      Distal perfusion: normal      SEDATION  Patient Sedated: Yes    Sedation Type:  Moderate (conscious) sedation  Sedation:  Fentanyl and propofol  Vital signs: Vital signs monitored during sedation      ANESTHESIA (see MAR for exact dosages)      Anesthesia method:  None    PROCEDURE DETAILS      Manipulation performed: yes      Reduction successful: no      Immobilization:  Sling (shoulder immobilizer)    Supplies used:  Sling    POST PROCEDURE DETAILS     Neurological function: diminished      Neurological function comment:  Decreased sensation in left 4th and 5th fingers    Distal perfusion: normal      Range of motion: improved        PROCEDURE    Patient Tolerance:  Patient tolerated the procedure  well with no immediate complications  Length of time physician/provider present for 1:1 monitoring during sedation: 20      Emergency Department Course:     Reviewed:  I reviewed nursing notes, vitals and past medical history    Assessments/ Consults:  ED Course as of 10/31/22 1623   Mon Oct 31, 2022   1219 I obtained history and examined the patient    1331 I performed dislocation reduction    1358 I performed sedated dislocation reduction    1510 TERENCE Hernandez. Ok for Obs to Dr. Howard.   1615 D/W Ifrah from ortho. Patient may go to the OR tonight.   1622 Updated Ana Maria Kunz.     Interventions:  1211 Morphine 4mg IV  1235 Methocarbamol 500mg PO  1243 Hydromorphone 0.5mg IV  1338 Hydromorphone 0.5mg IV  1409 Fentanyl 50mcg IV   1417 Fentanyl 50mcg IV  1435 Fentanyl 50mcg IV   1435 Propofol 300mg IV    Disposition:  The patient was placed in observation under the care of Dr. Howard.    Impression & Plan     CMS Diagnoses: None    Medical Decision Making:  This 62-year-old male patient presents to the ED after a fall.  Please see the HPI and exam for specifics.  Unfortunately, even after a total of 300 mg of propofol given in incremental boluses, we were unable to reduce the patient's dislocated left shoulder.  I discussed this with orthopedics and they believe the patient will likely be taken to the operating room for procedure tomorrow.  I then discussed the case with the hospitalist service who will accept the patient for observation.  Laboratory studies and repeat XR post reduction attempt are pending at the time of this dictation.      Diagnosis:    ICD-10-CM    1. Anterior shoulder dislocation, left, initial encounter  S43.015A Case Request: CLOSED REDUCTION LEFT SHOULDER     Case Request: CLOSED REDUCTION LEFT SHOULDER          Discharge Medications:  New Prescriptions    No medications on file       Scribe Disclosure:  Elisabeth STEVENS, am serving as a scribe at 11:55 AM on 10/31/2022 to document  services personally performed by Abdulaziz Conrad DO based on my observations and the provider's statements to me.          Abdulaziz Conrad DO  10/31/22 1513       Abdulaziz Conrad DO  10/31/22 [id number]

## 2022-10-31 NOTE — PROGRESS NOTES
RT note: assisted with conscious sedation, no intervention needed. EtCO2 monitored throughout procedure.

## 2022-10-31 NOTE — OP NOTE
PREOPERATIVE DIAGNOSIS: Left shoulder dislocation.    POSTOPERATIVE DIAGNOSIS: Left shoulder dislocation.    PROCEDURE(S): Closed reduction left shoulder dislocation.    ATTENDING SURGEON: Heath Romo MD.    ASSISTANT SURGEON: None.    ANESTHESIA: General.    EBL: None.    COMPLICATIONS: None apparent.    INDICATIONS: Matias is a pleasant 62 year-old right-hand-dominant gentleman who sustained a fall onto his outstretched left arm yesterday evening at home.  The patient noted significant pain and difficulty utilizing his left shoulder and presented to the emergency department today where radiographs demonstrated a left anteroinferior shoulder dislocation.  The patient underwent a failed closed reduction attempt under conscious sedation in the emergency department.  Given the persistent shoulder dislocation, closed reduction in the operating room with a paralytic anesthetic was recommended.  After full discussion of the benefits and risks of surgery, the patient provided informed consent to proceed.    The patient was identified in the pre-operative holding area on the date of surgery.  The operative site was marked with indelible marker and the patient was brought back to the operating room.  General anesthesia was induced without complication.  A pre-operative timeout was performed identifying the correct patient, procedure, operative site, and equipment necessary for the procedure.    While applying countertraction with a draw sheet under the left axilla, gentle traction and abduction of the left shoulder was applied, with a palpable clunk and obvious reduction of the glenohumeral joint achieved.  There was no crepitus throughout passive range of motion of the left shoulder.  There was no sense of significant recurrent instability.  A flat plate obtained in the operating room confirmed concentric reduction of the glenohumeral joint.  The patient was brought to the PACU in stable condition for further  postoperative care.    Postoperatively, the patient will utilize a sling with upright activity for immobilization.  The patient should limit any lifting activity with the left shoulder for the next few days.  The patient was provided a prescription for tramadol and may otherwise utilize over-the-counter medication if needed for relief of discomfort.  The patient may utilize ice to limit soft tissue swelling and pain.  Follow-up recommendation within the next week with a shoulder specialist would be recommended.

## 2022-10-31 NOTE — ANESTHESIA CARE TRANSFER NOTE
Patient: Fer Myles    Procedure: Procedure(s):  CLOSED REDUCTION LEFT SHOULDER       Diagnosis: Anterior shoulder dislocation, left, initial encounter [S43.015A]  Diagnosis Additional Information: No value filed.    Anesthesia Type:   General     Note:    Oropharynx: oropharynx clear of all foreign objects  Level of Consciousness: awake  Oxygen Supplementation: face mask  Level of Supplemental Oxygen (L/min / FiO2): 6  Independent Airway: airway patency satisfactory and stable  Dentition: dentition unchanged  Vital Signs Stable: post-procedure vital signs reviewed and stable  Report to RN Given: handoff report given  Patient transferred to: PACU    Handoff Report: Identifed the Patient, Identified the Reponsible Provider, Reviewed the pertinent medical history, Discussed the surgical course, Reviewed Intra-OP anesthesia mangement and issues during anesthesia, Set expectations for post-procedure period and Allowed opportunity for questions and acknowledgement of understanding      Vitals:  Vitals Value Taken Time   /71 10/31/22 1731   Temp     Pulse 100 10/31/22 1736   Resp 18 10/31/22 1736   SpO2 100 % 10/31/22 1736   Vitals shown include unvalidated device data.    Electronically Signed By: OLGA Jason CRNA  October 31, 2022  5:37 PM
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
## 2022-10-31 NOTE — SEDATION DOCUMENTATION
Assisted with reduction of L shoulder. Reduction was unsuccessful. Pt was given 300 mg IV propofol and 150 mg of IV fentanyl. Pt tolerated well, but ortho team will be consulted for possible surgical intervention. PT is alert and oriented, VSS and ABC intact.

## 2022-10-31 NOTE — ANESTHESIA PROCEDURE NOTES
Airway       Patient location during procedure: OR  Staff -        CRNA: Charbel Trevino APRN CRNA       Performed By: anesthesiologist  Consent for Airway        Urgency: elective  Indications and Patient Condition       Indications for airway management: roger-procedural       Induction type:RSI       Mask difficulty assessment: 1 - vent by mask    Final Airway Details       Final airway type: endotracheal airway       Successful airway: ETT - single  Endotracheal Airway Details        ETT size (mm): 8.0       Cuffed: yes       Successful intubation technique: video laryngoscopy       VL Blade Size: Glidescope 4       Grade View of Cords: 1       Adjucts: stylet       Position: Right       Measured from: lips       Secured at (cm): 24       Bite block used: None    Post intubation assessment        Placement verified by: capnometry, equal breath sounds and chest rise        Number of attempts at approach: 1       Number of other approaches attempted: 0       Secured with: plastic tape       Ease of procedure: easy       Dentition: Intact and Unchanged
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
## 2022-10-31 NOTE — ED TRIAGE NOTES
Pt tripped over dog at 2030 last night. C/o left shoulder pain. Concerned for dislocation. CMS intact. Did not hit head, no LOC.
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
## 2022-11-01 ENCOUNTER — PATIENT OUTREACH (OUTPATIENT)
Dept: CARE COORDINATION | Facility: CLINIC | Age: 62
End: 2022-11-01

## 2022-11-01 NOTE — PROGRESS NOTES
Clinic Care Coordination Contact    Situation: Patient chart reviewed by care coordinator.    Background: pt sustained a fall onto his outstretched left arm yesterday evening at home.  The patient noted significant pain and difficulty utilizing his left shoulder and presented to the emergency department today where radiographs demonstrated a left anteroinferior shoulder dislocation.    Assessment: Per Epic review, The patient underwent a failed closed reduction attempt under conscious sedation in the emergency department.  Given the persistent shoulder dislocation, closed reduction in the operating room with a paralytic anesthetic was recommended.        Plan/Recommendations: pt to follow up with shoulder specialist, SW[facility] alerted pcp to update. Will not open up CC at this time.      KESHA Saenz  , Care Coordination  [facility]  [phone]  [email or web address]

## 2022-11-04 ENCOUNTER — TELEPHONE (OUTPATIENT)
Dept: CARDIOLOGY | Facility: CLINIC | Age: 62
End: 2022-11-04

## 2022-11-04 DIAGNOSIS — I48.0 PAROXYSMAL ATRIAL FIBRILLATION (H): Primary | ICD-10-CM

## 2022-11-04 NOTE — TELEPHONE ENCOUNTER
"11/4/22 Spoke w pt who said he has started back on Eliquis \" for some time\" without any trouble. Most recent Hgb on 10/31/22 was 11.1 Nuria         "

## 2022-11-04 NOTE — TELEPHONE ENCOUNTER
1/4/22 Refill request recd for Eliquis. Pt w hx of hospitalization in 2/2022 for GIB. Eliquis held x 4 weeks. Called pt who stated he has resumed Eliquis per PCP and has had no further bleeding issues. Last Hgb 11.1 on 10/31/22.  Will check w Dr Rg on whether he would like repeat Hgb check w annual OV for 12/2022.  Nuria 412 pm

## 2022-11-07 NOTE — TELEPHONE ENCOUNTER
11/7/22 Per Dr Rg, he would like to recheck Hgb prior to OV in 12/2022. Pt last had CBC 10/31/22. Per NOAC/DOAC protocol will add Cr as well ( last one checked 2/2022)   Message sent to scheduling to contact pt  Nuria 1215 pm

## 2022-11-21 ENCOUNTER — TELEPHONE (OUTPATIENT)
Dept: CARDIOLOGY | Facility: CLINIC | Age: 62
End: 2022-11-21

## 2022-11-21 NOTE — TELEPHONE ENCOUNTER
----- Message from Ace Arreguin sent at 11/21/2022 11:53 AM CST -----  Regarding: Please fax orders  Please fax Dr. Rg's lab orders to [facility]. Pt will do labs there.    Nicole

## 2022-12-05 DIAGNOSIS — I48.0 PAROXYSMAL ATRIAL FIBRILLATION (H): ICD-10-CM

## 2023-01-01 ENCOUNTER — OFFICE VISIT (OUTPATIENT)
Dept: PODIATRY | Facility: CLINIC | Age: 63
End: 2023-01-01
Payer: COMMERCIAL

## 2023-01-01 ENCOUNTER — PATIENT OUTREACH (OUTPATIENT)
Dept: CARE COORDINATION | Facility: CLINIC | Age: 63
End: 2023-01-01
Payer: COMMERCIAL

## 2023-01-01 ENCOUNTER — ANESTHESIA EVENT (OUTPATIENT)
Dept: SURGERY | Facility: CLINIC | Age: 63
End: 2023-01-01
Payer: COMMERCIAL

## 2023-01-01 ENCOUNTER — APPOINTMENT (OUTPATIENT)
Dept: MRI IMAGING | Facility: CLINIC | Age: 63
End: 2023-01-01
Attending: STUDENT IN AN ORGANIZED HEALTH CARE EDUCATION/TRAINING PROGRAM
Payer: COMMERCIAL

## 2023-01-01 ENCOUNTER — ANCILLARY PROCEDURE (OUTPATIENT)
Dept: GENERAL RADIOLOGY | Facility: CLINIC | Age: 63
End: 2023-01-01
Attending: PODIATRIST
Payer: COMMERCIAL

## 2023-01-01 ENCOUNTER — ANESTHESIA (OUTPATIENT)
Dept: SURGERY | Facility: CLINIC | Age: 63
End: 2023-01-01
Payer: COMMERCIAL

## 2023-01-01 ENCOUNTER — HOME INFUSION (PRE-WILLOW HOME INFUSION) (OUTPATIENT)
Dept: PHARMACY | Facility: CLINIC | Age: 63
End: 2023-01-01
Payer: COMMERCIAL

## 2023-01-01 ENCOUNTER — TELEPHONE (OUTPATIENT)
Dept: PODIATRY | Facility: CLINIC | Age: 63
End: 2023-01-01
Payer: COMMERCIAL

## 2023-01-01 ENCOUNTER — NURSE TRIAGE (OUTPATIENT)
Dept: NURSING | Facility: CLINIC | Age: 63
End: 2023-01-01
Payer: COMMERCIAL

## 2023-01-01 ENCOUNTER — HEALTH MAINTENANCE LETTER (OUTPATIENT)
Age: 63
End: 2023-01-01

## 2023-01-01 ENCOUNTER — TELEPHONE (OUTPATIENT)
Dept: WOUND CARE | Facility: CLINIC | Age: 63
End: 2023-01-01

## 2023-01-01 ENCOUNTER — APPOINTMENT (OUTPATIENT)
Dept: GENERAL RADIOLOGY | Facility: CLINIC | Age: 63
End: 2023-01-01
Attending: EMERGENCY MEDICINE
Payer: COMMERCIAL

## 2023-01-01 ENCOUNTER — HOSPITAL ENCOUNTER (INPATIENT)
Facility: CLINIC | Age: 63
LOS: 4 days | Discharge: HOME | End: 2023-10-23
Attending: EMERGENCY MEDICINE | Admitting: STUDENT IN AN ORGANIZED HEALTH CARE EDUCATION/TRAINING PROGRAM
Payer: COMMERCIAL

## 2023-01-01 ENCOUNTER — TRANSFERRED RECORDS (OUTPATIENT)
Dept: HEALTH INFORMATION MANAGEMENT | Facility: CLINIC | Age: 63
End: 2023-01-01
Payer: COMMERCIAL

## 2023-01-01 ENCOUNTER — APPOINTMENT (OUTPATIENT)
Dept: PHYSICAL THERAPY | Facility: CLINIC | Age: 63
End: 2023-01-01
Attending: HOSPITALIST
Payer: COMMERCIAL

## 2023-01-01 ENCOUNTER — MEDICAL CORRESPONDENCE (OUTPATIENT)
Dept: HEALTH INFORMATION MANAGEMENT | Facility: CLINIC | Age: 63
End: 2023-01-01
Payer: COMMERCIAL

## 2023-01-01 ENCOUNTER — INFUSION THERAPY VISIT (OUTPATIENT)
Dept: INFUSION THERAPY | Facility: CLINIC | Age: 63
End: 2023-01-01
Attending: FAMILY MEDICINE
Payer: COMMERCIAL

## 2023-01-01 VITALS
OXYGEN SATURATION: 99 % | HEIGHT: 71 IN | TEMPERATURE: 97.9 F | RESPIRATION RATE: 16 BRPM | SYSTOLIC BLOOD PRESSURE: 134 MMHG | BODY MASS INDEX: 34.3 KG/M2 | WEIGHT: 245 LBS | HEART RATE: 98 BPM | DIASTOLIC BLOOD PRESSURE: 82 MMHG

## 2023-01-01 VITALS — SYSTOLIC BLOOD PRESSURE: 142 MMHG | DIASTOLIC BLOOD PRESSURE: 78 MMHG

## 2023-01-01 VITALS — SYSTOLIC BLOOD PRESSURE: 128 MMHG | DIASTOLIC BLOOD PRESSURE: 82 MMHG | BODY MASS INDEX: 34.17 KG/M2 | WEIGHT: 245 LBS

## 2023-01-01 VITALS — OXYGEN SATURATION: 100 % | DIASTOLIC BLOOD PRESSURE: 78 MMHG | HEART RATE: 57 BPM | SYSTOLIC BLOOD PRESSURE: 139 MMHG

## 2023-01-01 VITALS — HEIGHT: 71 IN | BODY MASS INDEX: 34.3 KG/M2 | WEIGHT: 245 LBS

## 2023-01-01 DIAGNOSIS — Z09 SURGERY FOLLOW-UP EXAMINATION: ICD-10-CM

## 2023-01-01 DIAGNOSIS — D62 ACUTE POSTHEMORRHAGIC ANEMIA: Primary | ICD-10-CM

## 2023-01-01 DIAGNOSIS — M86.9 OSTEOMYELITIS OF GREAT TOE OF RIGHT FOOT (H): ICD-10-CM

## 2023-01-01 DIAGNOSIS — T81.30XA WOUND DEHISCENCE: ICD-10-CM

## 2023-01-01 DIAGNOSIS — L03.115 CELLULITIS OF RIGHT FOOT: ICD-10-CM

## 2023-01-01 DIAGNOSIS — Z09 SURGERY FOLLOW-UP EXAMINATION: Primary | ICD-10-CM

## 2023-01-01 DIAGNOSIS — L97.511 ULCER OF RIGHT SECOND TOE, LIMITED TO BREAKDOWN OF SKIN (H): ICD-10-CM

## 2023-01-01 DIAGNOSIS — M86.9 OSTEOMYELITIS OF GREAT TOE OF RIGHT FOOT (H): Primary | ICD-10-CM

## 2023-01-01 DIAGNOSIS — T81.30XA WOUND DEHISCENCE: Primary | ICD-10-CM

## 2023-01-01 LAB
ALBUMIN SERPL BCG-MCNC: 3.2 G/DL (ref 3.5–5.2)
ALP SERPL-CCNC: 118 U/L (ref 40–129)
ALT SERPL W P-5'-P-CCNC: 14 U/L (ref 0–70)
ANION GAP SERPL CALCULATED.3IONS-SCNC: 10 MMOL/L (ref 7–15)
ANION GAP SERPL CALCULATED.3IONS-SCNC: 10 MMOL/L (ref 7–15)
ANION GAP SERPL CALCULATED.3IONS-SCNC: 9 MMOL/L (ref 7–15)
ANION GAP SERPL CALCULATED.3IONS-SCNC: 9 MMOL/L (ref 7–15)
AST SERPL W P-5'-P-CCNC: 30 U/L (ref 0–45)
BACTERIA BLD CULT: NO GROWTH
BACTERIA BLD CULT: NO GROWTH
BACTERIA TISS BX CULT: ABNORMAL
BASO+EOS+MONOS # BLD AUTO: ABNORMAL 10*3/UL
BASO+EOS+MONOS NFR BLD AUTO: ABNORMAL %
BASOPHILS # BLD AUTO: 0.1 10E3/UL (ref 0–0.2)
BASOPHILS NFR BLD AUTO: 1 %
BILIRUB SERPL-MCNC: 0.3 MG/DL
BUN SERPL-MCNC: 6.7 MG/DL (ref 8–23)
BUN SERPL-MCNC: 7.7 MG/DL (ref 8–23)
BUN SERPL-MCNC: 8.4 MG/DL (ref 8–23)
BUN SERPL-MCNC: 9.2 MG/DL (ref 8–23)
CALCIUM SERPL-MCNC: 8.6 MG/DL (ref 8.8–10.2)
CALCIUM SERPL-MCNC: 8.8 MG/DL (ref 8.8–10.2)
CALCIUM SERPL-MCNC: 8.8 MG/DL (ref 8.8–10.2)
CALCIUM SERPL-MCNC: 9 MG/DL (ref 8.8–10.2)
CHLORIDE SERPL-SCNC: 100 MMOL/L (ref 98–107)
CHLORIDE SERPL-SCNC: 101 MMOL/L (ref 98–107)
CHLORIDE SERPL-SCNC: 102 MMOL/L (ref 98–107)
CHLORIDE SERPL-SCNC: 103 MMOL/L (ref 98–107)
CREAT SERPL-MCNC: 0.93 MG/DL (ref 0.67–1.17)
CREAT SERPL-MCNC: 0.97 MG/DL (ref 0.67–1.17)
CREAT SERPL-MCNC: 1 MG/DL (ref 0.67–1.17)
CRP SERPL-MCNC: 28.46 MG/L
DEPRECATED HCO3 PLAS-SCNC: 24 MMOL/L (ref 22–29)
DEPRECATED HCO3 PLAS-SCNC: 25 MMOL/L (ref 22–29)
DEPRECATED HCO3 PLAS-SCNC: 26 MMOL/L (ref 22–29)
DEPRECATED HCO3 PLAS-SCNC: 27 MMOL/L (ref 22–29)
EGFRCR SERPLBLD CKD-EPI 2021: 84 ML/MIN/1.73M2
EGFRCR SERPLBLD CKD-EPI 2021: 87 ML/MIN/1.73M2
EGFRCR SERPLBLD CKD-EPI 2021: >90 ML/MIN/1.73M2
EOSINOPHIL # BLD AUTO: 0.2 10E3/UL (ref 0–0.7)
EOSINOPHIL NFR BLD AUTO: 3 %
ERYTHROCYTE [DISTWIDTH] IN BLOOD BY AUTOMATED COUNT: 17.9 % (ref 10–15)
ERYTHROCYTE [DISTWIDTH] IN BLOOD BY AUTOMATED COUNT: 18.1 % (ref 10–15)
ERYTHROCYTE [SEDIMENTATION RATE] IN BLOOD BY WESTERGREN METHOD: 38 MM/HR (ref 0–20)
FERRITIN SERPL-MCNC: 37 NG/ML (ref 31–409)
GLUCOSE BLDC GLUCOMTR-MCNC: 100 MG/DL (ref 70–99)
GLUCOSE BLDC GLUCOMTR-MCNC: 101 MG/DL (ref 70–99)
GLUCOSE BLDC GLUCOMTR-MCNC: 102 MG/DL (ref 70–99)
GLUCOSE BLDC GLUCOMTR-MCNC: 105 MG/DL (ref 70–99)
GLUCOSE BLDC GLUCOMTR-MCNC: 105 MG/DL (ref 70–99)
GLUCOSE BLDC GLUCOMTR-MCNC: 106 MG/DL (ref 70–99)
GLUCOSE BLDC GLUCOMTR-MCNC: 109 MG/DL (ref 70–99)
GLUCOSE BLDC GLUCOMTR-MCNC: 109 MG/DL (ref 70–99)
GLUCOSE BLDC GLUCOMTR-MCNC: 113 MG/DL (ref 70–99)
GLUCOSE BLDC GLUCOMTR-MCNC: 114 MG/DL (ref 70–99)
GLUCOSE BLDC GLUCOMTR-MCNC: 117 MG/DL (ref 70–99)
GLUCOSE BLDC GLUCOMTR-MCNC: 118 MG/DL (ref 70–99)
GLUCOSE BLDC GLUCOMTR-MCNC: 120 MG/DL (ref 70–99)
GLUCOSE BLDC GLUCOMTR-MCNC: 125 MG/DL (ref 70–99)
GLUCOSE BLDC GLUCOMTR-MCNC: 127 MG/DL (ref 70–99)
GLUCOSE BLDC GLUCOMTR-MCNC: 127 MG/DL (ref 70–99)
GLUCOSE BLDC GLUCOMTR-MCNC: 130 MG/DL (ref 70–99)
GLUCOSE BLDC GLUCOMTR-MCNC: 140 MG/DL (ref 70–99)
GLUCOSE BLDC GLUCOMTR-MCNC: 147 MG/DL (ref 70–99)
GLUCOSE BLDC GLUCOMTR-MCNC: 97 MG/DL (ref 70–99)
GLUCOSE SERPL-MCNC: 116 MG/DL (ref 70–99)
GLUCOSE SERPL-MCNC: 125 MG/DL (ref 70–99)
GLUCOSE SERPL-MCNC: 127 MG/DL (ref 70–99)
GLUCOSE SERPL-MCNC: 139 MG/DL (ref 70–99)
HBA1C MFR BLD: 5.6 %
HCT VFR BLD AUTO: 31.5 % (ref 40–53)
HCT VFR BLD AUTO: 34.6 % (ref 40–53)
HGB BLD-MCNC: 10.1 G/DL (ref 13.3–17.7)
HGB BLD-MCNC: 11 G/DL (ref 13.3–17.7)
HGB BLD-MCNC: 9.6 G/DL (ref 13.3–17.7)
HOLD SPECIMEN: NORMAL
IMM GRANULOCYTES # BLD: 0 10E3/UL
IMM GRANULOCYTES NFR BLD: 0 %
INR PPP: 1.03 (ref 0.85–1.15)
IRON BINDING CAPACITY (ROCHE): 220 UG/DL (ref 240–430)
IRON SATN MFR SERPL: 11 % (ref 15–46)
IRON SERPL-MCNC: 25 UG/DL (ref 61–157)
LYMPHOCYTES # BLD AUTO: 1.5 10E3/UL (ref 0.8–5.3)
LYMPHOCYTES NFR BLD AUTO: 19 %
MCH RBC QN AUTO: 33.2 PG (ref 26.5–33)
MCH RBC QN AUTO: 33.3 PG (ref 26.5–33)
MCHC RBC AUTO-ENTMCNC: 31.8 G/DL (ref 31.5–36.5)
MCHC RBC AUTO-ENTMCNC: 32.1 G/DL (ref 31.5–36.5)
MCV RBC AUTO: 104 FL (ref 78–100)
MCV RBC AUTO: 105 FL (ref 78–100)
MONOCYTES # BLD AUTO: 0.8 10E3/UL (ref 0–1.3)
MONOCYTES NFR BLD AUTO: 9 %
NEUTROPHILS # BLD AUTO: 5.5 10E3/UL (ref 1.6–8.3)
NEUTROPHILS NFR BLD AUTO: 68 %
NRBC # BLD AUTO: 0 10E3/UL
NRBC BLD AUTO-RTO: 0 /100
PATH REPORT.COMMENTS IMP SPEC: NORMAL
PATH REPORT.COMMENTS IMP SPEC: NORMAL
PATH REPORT.FINAL DX SPEC: NORMAL
PATH REPORT.GROSS SPEC: NORMAL
PATH REPORT.MICROSCOPIC SPEC OTHER STN: NORMAL
PATH REPORT.RELEVANT HX SPEC: NORMAL
PHOTO IMAGE: NORMAL
PLATELET # BLD AUTO: 352 10E3/UL (ref 150–450)
PLATELET # BLD AUTO: 451 10E3/UL (ref 150–450)
POTASSIUM SERPL-SCNC: 3.8 MMOL/L (ref 3.4–5.3)
POTASSIUM SERPL-SCNC: 3.8 MMOL/L (ref 3.4–5.3)
POTASSIUM SERPL-SCNC: 4.3 MMOL/L (ref 3.4–5.3)
POTASSIUM SERPL-SCNC: 5.3 MMOL/L (ref 3.4–5.3)
POTASSIUM SERPL-SCNC: 5.4 MMOL/L (ref 3.4–5.3)
PROT SERPL-MCNC: 7.2 G/DL (ref 6.4–8.3)
RBC # BLD AUTO: 3.04 10E6/UL (ref 4.4–5.9)
RBC # BLD AUTO: 3.3 10E6/UL (ref 4.4–5.9)
SODIUM SERPL-SCNC: 136 MMOL/L (ref 135–145)
SODIUM SERPL-SCNC: 136 MMOL/L (ref 135–145)
SODIUM SERPL-SCNC: 137 MMOL/L (ref 135–145)
SODIUM SERPL-SCNC: 137 MMOL/L (ref 135–145)
VANCOMYCIN SERPL-MCNC: 20.2 UG/ML
VANCOMYCIN SERPL-MCNC: 32 UG/ML
WBC # BLD AUTO: 6.8 10E3/UL (ref 4–11)
WBC # BLD AUTO: 8.1 10E3/UL (ref 4–11)

## 2023-01-01 PROCEDURE — 5A09357 ASSISTANCE WITH RESPIRATORY VENTILATION, LESS THAN 24 CONSECUTIVE HOURS, CONTINUOUS POSITIVE AIRWAY PRESSURE: ICD-10-PCS | Performed by: INTERNAL MEDICINE

## 2023-01-01 PROCEDURE — 80048 BASIC METABOLIC PNL TOTAL CA: CPT | Performed by: INTERNAL MEDICINE

## 2023-01-01 PROCEDURE — 99232 SBSQ HOSP IP/OBS MODERATE 35: CPT | Performed by: INTERNAL MEDICINE

## 2023-01-01 PROCEDURE — 97161 PT EVAL LOW COMPLEX 20 MIN: CPT | Mod: GP | Performed by: PHYSICAL THERAPIST

## 2023-01-01 PROCEDURE — 87040 BLOOD CULTURE FOR BACTERIA: CPT | Performed by: STUDENT IN AN ORGANIZED HEALTH CARE EDUCATION/TRAINING PROGRAM

## 2023-01-01 PROCEDURE — 36415 COLL VENOUS BLD VENIPUNCTURE: CPT | Performed by: STUDENT IN AN ORGANIZED HEALTH CARE EDUCATION/TRAINING PROGRAM

## 2023-01-01 PROCEDURE — 250N000011 HC RX IP 250 OP 636: Performed by: INTERNAL MEDICINE

## 2023-01-01 PROCEDURE — 11042 DBRDMT SUBQ TIS 1ST 20SQCM/<: CPT | Mod: 58 | Performed by: PODIATRIST

## 2023-01-01 PROCEDURE — 999N000111 HC STATISTIC OT IP EVAL DEFER: Performed by: OCCUPATIONAL THERAPIST

## 2023-01-01 PROCEDURE — 120N000001 HC R&B MED SURG/OB

## 2023-01-01 PROCEDURE — 99213 OFFICE O/P EST LOW 20 MIN: CPT | Mod: 24 | Performed by: PODIATRIST

## 2023-01-01 PROCEDURE — 250N000013 HC RX MED GY IP 250 OP 250 PS 637: Performed by: STUDENT IN AN ORGANIZED HEALTH CARE EDUCATION/TRAINING PROGRAM

## 2023-01-01 PROCEDURE — 96374 THER/PROPH/DIAG INJ IV PUSH: CPT

## 2023-01-01 PROCEDURE — 370N000017 HC ANESTHESIA TECHNICAL FEE, PER MIN: Performed by: PODIATRIST

## 2023-01-01 PROCEDURE — 36415 COLL VENOUS BLD VENIPUNCTURE: CPT | Performed by: NURSE PRACTITIONER

## 2023-01-01 PROCEDURE — 250N000011 HC RX IP 250 OP 636: Performed by: STUDENT IN AN ORGANIZED HEALTH CARE EDUCATION/TRAINING PROGRAM

## 2023-01-01 PROCEDURE — L3260 AMBULATORY SURGICAL BOOT EAC: HCPCS

## 2023-01-01 PROCEDURE — 258N000003 HC RX IP 258 OP 636: Performed by: STUDENT IN AN ORGANIZED HEALTH CARE EDUCATION/TRAINING PROGRAM

## 2023-01-01 PROCEDURE — 88311 DECALCIFY TISSUE: CPT | Mod: 26 | Performed by: PATHOLOGY

## 2023-01-01 PROCEDURE — 80202 ASSAY OF VANCOMYCIN: CPT | Performed by: STUDENT IN AN ORGANIZED HEALTH CARE EDUCATION/TRAINING PROGRAM

## 2023-01-01 PROCEDURE — 83550 IRON BINDING TEST: CPT | Performed by: INTERNAL MEDICINE

## 2023-01-01 PROCEDURE — 82728 ASSAY OF FERRITIN: CPT | Performed by: INTERNAL MEDICINE

## 2023-01-01 PROCEDURE — 36415 COLL VENOUS BLD VENIPUNCTURE: CPT | Performed by: INTERNAL MEDICINE

## 2023-01-01 PROCEDURE — 85610 PROTHROMBIN TIME: CPT | Performed by: EMERGENCY MEDICINE

## 2023-01-01 PROCEDURE — 85652 RBC SED RATE AUTOMATED: CPT | Performed by: EMERGENCY MEDICINE

## 2023-01-01 PROCEDURE — 250N000013 HC RX MED GY IP 250 OP 250 PS 637: Performed by: SPECIALIST

## 2023-01-01 PROCEDURE — 99239 HOSP IP/OBS DSCHRG MGMT >30: CPT | Performed by: HOSPITALIST

## 2023-01-01 PROCEDURE — 250N000011 HC RX IP 250 OP 636: Performed by: PODIATRIST

## 2023-01-01 PROCEDURE — 99024 POSTOP FOLLOW-UP VISIT: CPT | Performed by: PODIATRIST

## 2023-01-01 PROCEDURE — 85027 COMPLETE CBC AUTOMATED: CPT | Performed by: INTERNAL MEDICINE

## 2023-01-01 PROCEDURE — 88305 TISSUE EXAM BY PATHOLOGIST: CPT | Mod: 26 | Performed by: PATHOLOGY

## 2023-01-01 PROCEDURE — 87077 CULTURE AEROBIC IDENTIFY: CPT | Performed by: PODIATRIST

## 2023-01-01 PROCEDURE — 97530 THERAPEUTIC ACTIVITIES: CPT | Mod: GP | Performed by: PHYSICAL THERAPIST

## 2023-01-01 PROCEDURE — 36415 COLL VENOUS BLD VENIPUNCTURE: CPT | Performed by: HOSPITALIST

## 2023-01-01 PROCEDURE — 83036 HEMOGLOBIN GLYCOSYLATED A1C: CPT | Performed by: SPECIALIST

## 2023-01-01 PROCEDURE — 999N000157 HC STATISTIC RCP TIME EA 10 MIN

## 2023-01-01 PROCEDURE — 85025 COMPLETE CBC W/AUTO DIFF WBC: CPT | Performed by: STUDENT IN AN ORGANIZED HEALTH CARE EDUCATION/TRAINING PROGRAM

## 2023-01-01 PROCEDURE — 99254 IP/OBS CNSLTJ NEW/EST MOD 60: CPT | Performed by: SPECIALIST

## 2023-01-01 PROCEDURE — 99232 SBSQ HOSP IP/OBS MODERATE 35: CPT | Performed by: HOSPITALIST

## 2023-01-01 PROCEDURE — 99222 1ST HOSP IP/OBS MODERATE 55: CPT | Performed by: STUDENT IN AN ORGANIZED HEALTH CARE EDUCATION/TRAINING PROGRAM

## 2023-01-01 PROCEDURE — 73660 X-RAY EXAM OF TOE(S): CPT | Mod: RT

## 2023-01-01 PROCEDURE — 99222 1ST HOSP IP/OBS MODERATE 55: CPT | Mod: 57 | Performed by: PODIATRIST

## 2023-01-01 PROCEDURE — 73720 MRI LWR EXTREMITY W/O&W/DYE: CPT | Mod: RT

## 2023-01-01 PROCEDURE — 28825 PARTIAL AMPUTATION OF TOE: CPT | Mod: 51 | Performed by: PODIATRIST

## 2023-01-01 PROCEDURE — 73630 X-RAY EXAM OF FOOT: CPT | Mod: TC | Performed by: RADIOLOGY

## 2023-01-01 PROCEDURE — 250N000013 HC RX MED GY IP 250 OP 250 PS 637: Performed by: PODIATRIST

## 2023-01-01 PROCEDURE — 99207 PR NO BILLABLE SERVICE THIS VISIT: CPT | Performed by: HOSPITALIST

## 2023-01-01 PROCEDURE — 97116 GAIT TRAINING THERAPY: CPT | Mod: GP | Performed by: PHYSICAL THERAPIST

## 2023-01-01 PROCEDURE — 86140 C-REACTIVE PROTEIN: CPT | Performed by: EMERGENCY MEDICINE

## 2023-01-01 PROCEDURE — 84132 ASSAY OF SERUM POTASSIUM: CPT | Performed by: NURSE PRACTITIONER

## 2023-01-01 PROCEDURE — 85018 HEMOGLOBIN: CPT | Performed by: INTERNAL MEDICINE

## 2023-01-01 PROCEDURE — 94660 CPAP INITIATION&MGMT: CPT

## 2023-01-01 PROCEDURE — A9585 GADOBUTROL INJECTION: HCPCS | Performed by: STUDENT IN AN ORGANIZED HEALTH CARE EDUCATION/TRAINING PROGRAM

## 2023-01-01 PROCEDURE — 82565 ASSAY OF CREATININE: CPT | Performed by: STUDENT IN AN ORGANIZED HEALTH CARE EDUCATION/TRAINING PROGRAM

## 2023-01-01 PROCEDURE — 14040 TIS TRNFR F/C/C/M/N/A/G/H/F: CPT | Mod: RT | Performed by: PODIATRIST

## 2023-01-01 PROCEDURE — 258N000003 HC RX IP 258 OP 636: Performed by: ANESTHESIOLOGY

## 2023-01-01 PROCEDURE — 0Y6P0Z2 DETACHMENT AT RIGHT 1ST TOE, MID, OPEN APPROACH: ICD-10-PCS | Performed by: PODIATRIST

## 2023-01-01 PROCEDURE — 999N000063 XR FOOT PORT RIGHT 3 VIEWS: Mod: RT

## 2023-01-01 PROCEDURE — 999N000141 HC STATISTIC PRE-PROCEDURE NURSING ASSESSMENT: Performed by: PODIATRIST

## 2023-01-01 PROCEDURE — 710N000009 HC RECOVERY PHASE 1, LEVEL 1, PER MIN: Performed by: PODIATRIST

## 2023-01-01 PROCEDURE — 272N000001 HC OR GENERAL SUPPLY STERILE: Performed by: PODIATRIST

## 2023-01-01 PROCEDURE — 80048 BASIC METABOLIC PNL TOTAL CA: CPT | Performed by: HOSPITALIST

## 2023-01-01 PROCEDURE — 255N000002 HC RX 255 OP 636: Performed by: STUDENT IN AN ORGANIZED HEALTH CARE EDUCATION/TRAINING PROGRAM

## 2023-01-01 PROCEDURE — 85025 COMPLETE CBC W/AUTO DIFF WBC: CPT | Performed by: EMERGENCY MEDICINE

## 2023-01-01 PROCEDURE — 258N000003 HC RX IP 258 OP 636: Performed by: NURSE ANESTHETIST, CERTIFIED REGISTERED

## 2023-01-01 PROCEDURE — 258N000003 HC RX IP 258 OP 636

## 2023-01-01 PROCEDURE — 250N000011 HC RX IP 250 OP 636: Mod: JZ

## 2023-01-01 PROCEDURE — 271N000001 HC OR GENERAL SUPPLY NON-STERILE: Performed by: PODIATRIST

## 2023-01-01 PROCEDURE — 99285 EMERGENCY DEPT VISIT HI MDM: CPT | Mod: 25

## 2023-01-01 PROCEDURE — 250N000012 HC RX MED GY IP 250 OP 636 PS 637: Performed by: STUDENT IN AN ORGANIZED HEALTH CARE EDUCATION/TRAINING PROGRAM

## 2023-01-01 PROCEDURE — 250N000011 HC RX IP 250 OP 636: Performed by: NURSE ANESTHETIST, CERTIFIED REGISTERED

## 2023-01-01 PROCEDURE — 250N000011 HC RX IP 250 OP 636: Mod: JZ | Performed by: EMERGENCY MEDICINE

## 2023-01-01 PROCEDURE — 88311 DECALCIFY TISSUE: CPT | Mod: TC | Performed by: PODIATRIST

## 2023-01-01 PROCEDURE — 80053 COMPREHEN METABOLIC PANEL: CPT | Performed by: STUDENT IN AN ORGANIZED HEALTH CARE EDUCATION/TRAINING PROGRAM

## 2023-01-01 PROCEDURE — 360N000082 HC SURGERY LEVEL 2 W/ FLUORO, PER MIN: Performed by: PODIATRIST

## 2023-01-01 PROCEDURE — 250N000013 HC RX MED GY IP 250 OP 250 PS 637: Performed by: HOSPITALIST

## 2023-01-01 PROCEDURE — 250N000009 HC RX 250: Performed by: NURSE ANESTHETIST, CERTIFIED REGISTERED

## 2023-01-01 PROCEDURE — 80053 COMPREHEN METABOLIC PANEL: CPT | Performed by: EMERGENCY MEDICINE

## 2023-01-01 PROCEDURE — 250N000009 HC RX 250: Performed by: PODIATRIST

## 2023-01-01 RX ORDER — LIDOCAINE HYDROCHLORIDE 20 MG/ML
INJECTION, SOLUTION INFILTRATION; PERINEURAL PRN
Status: DISCONTINUED | OUTPATIENT
Start: 2023-01-01 | End: 2023-01-01

## 2023-01-01 RX ORDER — GADOBUTROL 604.72 MG/ML
11 INJECTION INTRAVENOUS ONCE
Status: COMPLETED | OUTPATIENT
Start: 2023-01-01 | End: 2023-01-01

## 2023-01-01 RX ORDER — ONDANSETRON 2 MG/ML
4 INJECTION INTRAMUSCULAR; INTRAVENOUS EVERY 30 MIN PRN
Status: DISCONTINUED | OUTPATIENT
Start: 2023-01-01 | End: 2023-01-01

## 2023-01-01 RX ORDER — METOPROLOL SUCCINATE 25 MG/1
25 TABLET, EXTENDED RELEASE ORAL DAILY
Status: DISCONTINUED | OUTPATIENT
Start: 2023-01-01 | End: 2023-01-01 | Stop reason: HOSPADM

## 2023-01-01 RX ORDER — HEPARIN SODIUM (PORCINE) LOCK FLUSH IV SOLN 100 UNIT/ML 100 UNIT/ML
5 SOLUTION INTRAVENOUS
Status: CANCELLED | OUTPATIENT
Start: 2024-01-01

## 2023-01-01 RX ORDER — SULFAMETHOXAZOLE/TRIMETHOPRIM 800-160 MG
1 TABLET ORAL 2 TIMES DAILY
Qty: 20 TABLET | Refills: 0 | Status: SHIPPED | OUTPATIENT
Start: 2023-01-01 | End: 2024-01-01

## 2023-01-01 RX ORDER — ALLOPURINOL 100 MG/1
200 TABLET ORAL DAILY
Status: DISCONTINUED | OUTPATIENT
Start: 2023-01-01 | End: 2023-01-01 | Stop reason: HOSPADM

## 2023-01-01 RX ORDER — MEPERIDINE HYDROCHLORIDE 25 MG/ML
25 INJECTION INTRAMUSCULAR; INTRAVENOUS; SUBCUTANEOUS EVERY 30 MIN PRN
Status: CANCELLED | OUTPATIENT
Start: 2024-01-01

## 2023-01-01 RX ORDER — ALBUTEROL SULFATE 0.83 MG/ML
2.5 SOLUTION RESPIRATORY (INHALATION)
Status: CANCELLED | OUTPATIENT
Start: 2023-01-01

## 2023-01-01 RX ORDER — FUROSEMIDE 20 MG
20 TABLET ORAL DAILY
COMMUNITY

## 2023-01-01 RX ORDER — ALBUTEROL SULFATE 0.83 MG/ML
2.5 SOLUTION RESPIRATORY (INHALATION)
Status: CANCELLED | OUTPATIENT
Start: 2024-01-01

## 2023-01-01 RX ORDER — HYDROMORPHONE HCL IN WATER/PF 6 MG/30 ML
0.2 PATIENT CONTROLLED ANALGESIA SYRINGE INTRAVENOUS EVERY 5 MIN PRN
Status: DISCONTINUED | OUTPATIENT
Start: 2023-01-01 | End: 2023-01-01

## 2023-01-01 RX ORDER — ONDANSETRON 4 MG/1
4 TABLET, ORALLY DISINTEGRATING ORAL EVERY 6 HOURS PRN
Status: DISCONTINUED | OUTPATIENT
Start: 2023-01-01 | End: 2023-01-01 | Stop reason: HOSPADM

## 2023-01-01 RX ORDER — BALSALAZIDE DISODIUM 750 MG/1
4500 CAPSULE ORAL DAILY
Status: DISCONTINUED | OUTPATIENT
Start: 2023-01-01 | End: 2023-01-01 | Stop reason: HOSPADM

## 2023-01-01 RX ORDER — HEPARIN SODIUM,PORCINE 10 UNIT/ML
5-20 VIAL (ML) INTRAVENOUS DAILY PRN
Status: CANCELLED | OUTPATIENT
Start: 2023-01-01

## 2023-01-01 RX ORDER — HEPARIN SODIUM,PORCINE 10 UNIT/ML
5-20 VIAL (ML) INTRAVENOUS DAILY PRN
Status: CANCELLED | OUTPATIENT
Start: 2024-01-01

## 2023-01-01 RX ORDER — FUROSEMIDE 20 MG
20 TABLET ORAL 2 TIMES DAILY
Status: DISCONTINUED | OUTPATIENT
Start: 2023-01-01 | End: 2023-01-01 | Stop reason: HOSPADM

## 2023-01-01 RX ORDER — METOPROLOL SUCCINATE 25 MG/1
25 TABLET, EXTENDED RELEASE ORAL DAILY
COMMUNITY
Start: 2023-01-01

## 2023-01-01 RX ORDER — DOXYCYCLINE 100 MG/1
100 CAPSULE ORAL EVERY 12 HOURS
Qty: 14 CAPSULE | Refills: 0 | Status: SHIPPED | OUTPATIENT
Start: 2023-01-01 | End: 2023-01-01

## 2023-01-01 RX ORDER — CEFTRIAXONE 2 G/1
2 INJECTION, POWDER, FOR SOLUTION INTRAMUSCULAR; INTRAVENOUS ONCE
Status: COMPLETED | OUTPATIENT
Start: 2023-01-01 | End: 2023-01-01

## 2023-01-01 RX ORDER — EPINEPHRINE 1 MG/ML
0.3 INJECTION, SOLUTION INTRAMUSCULAR; SUBCUTANEOUS EVERY 5 MIN PRN
Status: CANCELLED | OUTPATIENT
Start: 2024-01-01

## 2023-01-01 RX ORDER — CEFEPIME HYDROCHLORIDE 2 G/1
2 INJECTION, POWDER, FOR SOLUTION INTRAVENOUS EVERY 12 HOURS
Status: DISCONTINUED | OUTPATIENT
Start: 2023-01-01 | End: 2023-01-01

## 2023-01-01 RX ORDER — CEFDINIR 300 MG/1
300 CAPSULE ORAL EVERY 12 HOURS
Qty: 14 CAPSULE | Refills: 0 | Status: SHIPPED | OUTPATIENT
Start: 2023-01-01 | End: 2023-01-01

## 2023-01-01 RX ORDER — PANTOPRAZOLE SODIUM 40 MG/1
40 TABLET, DELAYED RELEASE ORAL DAILY
COMMUNITY
End: 2024-01-01

## 2023-01-01 RX ORDER — DIPHENHYDRAMINE HYDROCHLORIDE 50 MG/ML
50 INJECTION INTRAMUSCULAR; INTRAVENOUS
Status: CANCELLED
Start: 2023-01-01

## 2023-01-01 RX ORDER — HYDROMORPHONE HCL IN WATER/PF 6 MG/30 ML
0.4 PATIENT CONTROLLED ANALGESIA SYRINGE INTRAVENOUS EVERY 5 MIN PRN
Status: DISCONTINUED | OUTPATIENT
Start: 2023-01-01 | End: 2023-01-01

## 2023-01-01 RX ORDER — LABETALOL HYDROCHLORIDE 5 MG/ML
10 INJECTION, SOLUTION INTRAVENOUS
Status: DISCONTINUED | OUTPATIENT
Start: 2023-01-01 | End: 2023-01-01

## 2023-01-01 RX ORDER — ONDANSETRON 4 MG/1
4 TABLET, ORALLY DISINTEGRATING ORAL EVERY 30 MIN PRN
Status: DISCONTINUED | OUTPATIENT
Start: 2023-01-01 | End: 2023-01-01

## 2023-01-01 RX ORDER — SODIUM CHLORIDE, SODIUM LACTATE, POTASSIUM CHLORIDE, CALCIUM CHLORIDE 600; 310; 30; 20 MG/100ML; MG/100ML; MG/100ML; MG/100ML
INJECTION, SOLUTION INTRAVENOUS CONTINUOUS
Status: DISCONTINUED | OUTPATIENT
Start: 2023-01-01 | End: 2023-01-01 | Stop reason: HOSPADM

## 2023-01-01 RX ORDER — EMPAGLIFLOZIN 25 MG/1
25 TABLET, FILM COATED ORAL DAILY
COMMUNITY
Start: 2023-01-01 | End: 2024-01-01

## 2023-01-01 RX ORDER — PROPOFOL 10 MG/ML
INJECTION, EMULSION INTRAVENOUS CONTINUOUS PRN
Status: DISCONTINUED | OUTPATIENT
Start: 2023-01-01 | End: 2023-01-01

## 2023-01-01 RX ORDER — SODIUM CHLORIDE, SODIUM LACTATE, POTASSIUM CHLORIDE, CALCIUM CHLORIDE 600; 310; 30; 20 MG/100ML; MG/100ML; MG/100ML; MG/100ML
INJECTION, SOLUTION INTRAVENOUS CONTINUOUS
Status: DISCONTINUED | OUTPATIENT
Start: 2023-01-01 | End: 2023-01-01

## 2023-01-01 RX ORDER — FENTANYL CITRATE 50 UG/ML
50 INJECTION, SOLUTION INTRAMUSCULAR; INTRAVENOUS EVERY 5 MIN PRN
Status: DISCONTINUED | OUTPATIENT
Start: 2023-01-01 | End: 2023-01-01

## 2023-01-01 RX ORDER — DOXYCYCLINE 100 MG/1
100 CAPSULE ORAL EVERY 12 HOURS SCHEDULED
Status: DISCONTINUED | OUTPATIENT
Start: 2023-01-01 | End: 2023-01-01 | Stop reason: HOSPADM

## 2023-01-01 RX ORDER — ONDANSETRON 2 MG/ML
INJECTION INTRAMUSCULAR; INTRAVENOUS PRN
Status: DISCONTINUED | OUTPATIENT
Start: 2023-01-01 | End: 2023-01-01

## 2023-01-01 RX ORDER — LISINOPRIL 20 MG/1
20 TABLET ORAL DAILY
Status: DISCONTINUED | OUTPATIENT
Start: 2023-01-01 | End: 2023-01-01

## 2023-01-01 RX ORDER — BUPIVACAINE HYDROCHLORIDE 5 MG/ML
INJECTION, SOLUTION EPIDURAL; INTRACAUDAL PRN
Status: DISCONTINUED | OUTPATIENT
Start: 2023-01-01 | End: 2023-01-01

## 2023-01-01 RX ORDER — EPINEPHRINE 1 MG/ML
0.3 INJECTION, SOLUTION INTRAMUSCULAR; SUBCUTANEOUS EVERY 5 MIN PRN
Status: CANCELLED | OUTPATIENT
Start: 2023-01-01

## 2023-01-01 RX ORDER — LISINOPRIL 10 MG/1
10 TABLET ORAL DAILY
Status: DISCONTINUED | OUTPATIENT
Start: 2023-01-01 | End: 2023-01-01 | Stop reason: HOSPADM

## 2023-01-01 RX ORDER — DEXTROSE MONOHYDRATE 25 G/50ML
25-50 INJECTION, SOLUTION INTRAVENOUS
Status: DISCONTINUED | OUTPATIENT
Start: 2023-01-01 | End: 2023-01-01 | Stop reason: HOSPADM

## 2023-01-01 RX ORDER — NICOTINE POLACRILEX 4 MG
15-30 LOZENGE BUCCAL
Status: DISCONTINUED | OUTPATIENT
Start: 2023-01-01 | End: 2023-01-01 | Stop reason: HOSPADM

## 2023-01-01 RX ORDER — HEPARIN SODIUM (PORCINE) LOCK FLUSH IV SOLN 100 UNIT/ML 100 UNIT/ML
5 SOLUTION INTRAVENOUS
Status: CANCELLED | OUTPATIENT
Start: 2023-01-01

## 2023-01-01 RX ORDER — PANTOPRAZOLE SODIUM 40 MG/1
40 TABLET, DELAYED RELEASE ORAL DAILY
Status: DISCONTINUED | OUTPATIENT
Start: 2023-01-01 | End: 2023-01-01 | Stop reason: HOSPADM

## 2023-01-01 RX ORDER — ALBUTEROL SULFATE 90 UG/1
1-2 AEROSOL, METERED RESPIRATORY (INHALATION)
Status: CANCELLED
Start: 2023-01-01

## 2023-01-01 RX ORDER — ALBUTEROL SULFATE 90 UG/1
1-2 AEROSOL, METERED RESPIRATORY (INHALATION)
Status: CANCELLED
Start: 2024-01-01

## 2023-01-01 RX ORDER — METHYLPREDNISOLONE SODIUM SUCCINATE 125 MG/2ML
125 INJECTION, POWDER, LYOPHILIZED, FOR SOLUTION INTRAMUSCULAR; INTRAVENOUS
Status: CANCELLED
Start: 2023-01-01

## 2023-01-01 RX ORDER — METHYLPREDNISOLONE SODIUM SUCCINATE 125 MG/2ML
125 INJECTION, POWDER, LYOPHILIZED, FOR SOLUTION INTRAMUSCULAR; INTRAVENOUS
Status: CANCELLED
Start: 2024-01-01

## 2023-01-01 RX ORDER — DIPHENHYDRAMINE HYDROCHLORIDE 50 MG/ML
50 INJECTION INTRAMUSCULAR; INTRAVENOUS
Status: CANCELLED
Start: 2024-01-01

## 2023-01-01 RX ORDER — ONDANSETRON 2 MG/ML
4 INJECTION INTRAMUSCULAR; INTRAVENOUS EVERY 6 HOURS PRN
Status: DISCONTINUED | OUTPATIENT
Start: 2023-01-01 | End: 2023-01-01 | Stop reason: HOSPADM

## 2023-01-01 RX ORDER — ALLOPURINOL 100 MG/1
200 TABLET ORAL DAILY
COMMUNITY
Start: 2023-01-01

## 2023-01-01 RX ORDER — EPHEDRINE SULFATE 50 MG/ML
INJECTION, SOLUTION INTRAMUSCULAR; INTRAVENOUS; SUBCUTANEOUS PRN
Status: DISCONTINUED | OUTPATIENT
Start: 2023-01-01 | End: 2023-01-01

## 2023-01-01 RX ORDER — FENTANYL CITRATE 50 UG/ML
25 INJECTION, SOLUTION INTRAMUSCULAR; INTRAVENOUS EVERY 5 MIN PRN
Status: DISCONTINUED | OUTPATIENT
Start: 2023-01-01 | End: 2023-01-01

## 2023-01-01 RX ORDER — OMEGA-3 FATTY ACIDS/FISH OIL 300-1000MG
1000 CAPSULE ORAL DAILY
COMMUNITY
End: 2024-01-01

## 2023-01-01 RX ORDER — MEPERIDINE HYDROCHLORIDE 25 MG/ML
25 INJECTION INTRAMUSCULAR; INTRAVENOUS; SUBCUTANEOUS EVERY 30 MIN PRN
Status: CANCELLED | OUTPATIENT
Start: 2023-01-01

## 2023-01-01 RX ORDER — FENTANYL CITRATE 50 UG/ML
INJECTION, SOLUTION INTRAMUSCULAR; INTRAVENOUS PRN
Status: DISCONTINUED | OUTPATIENT
Start: 2023-01-01 | End: 2023-01-01

## 2023-01-01 RX ORDER — CEFDINIR 300 MG/1
300 CAPSULE ORAL EVERY 12 HOURS SCHEDULED
Status: DISCONTINUED | OUTPATIENT
Start: 2023-01-01 | End: 2023-01-01 | Stop reason: HOSPADM

## 2023-01-01 RX ADMIN — LISINOPRIL 20 MG: 20 TABLET ORAL at 09:15

## 2023-01-01 RX ADMIN — SODIUM CHLORIDE, POTASSIUM CHLORIDE, SODIUM LACTATE AND CALCIUM CHLORIDE: 600; 310; 30; 20 INJECTION, SOLUTION INTRAVENOUS at 09:58

## 2023-01-01 RX ADMIN — CEFEPIME HYDROCHLORIDE 2 G: 2 INJECTION, POWDER, FOR SOLUTION INTRAVENOUS at 06:56

## 2023-01-01 RX ADMIN — FUROSEMIDE 20 MG: 20 TABLET ORAL at 20:55

## 2023-01-01 RX ADMIN — VANCOMYCIN HYDROCHLORIDE 750 MG: 1 INJECTION, POWDER, LYOPHILIZED, FOR SOLUTION INTRAVENOUS at 21:34

## 2023-01-01 RX ADMIN — METOPROLOL SUCCINATE 25 MG: 25 TABLET, EXTENDED RELEASE ORAL at 09:59

## 2023-01-01 RX ADMIN — FUROSEMIDE 20 MG: 20 TABLET ORAL at 20:42

## 2023-01-01 RX ADMIN — ALLOPURINOL 200 MG: 100 TABLET ORAL at 09:15

## 2023-01-01 RX ADMIN — VANCOMYCIN HYDROCHLORIDE 750 MG: 1 INJECTION, POWDER, LYOPHILIZED, FOR SOLUTION INTRAVENOUS at 22:31

## 2023-01-01 RX ADMIN — METOPROLOL SUCCINATE 25 MG: 25 TABLET, EXTENDED RELEASE ORAL at 09:13

## 2023-01-01 RX ADMIN — CEFTRIAXONE SODIUM 2 G: 2 INJECTION, POWDER, FOR SOLUTION INTRAMUSCULAR; INTRAVENOUS at 16:09

## 2023-01-01 RX ADMIN — PROPOFOL 100 MCG/KG/MIN: 10 INJECTION, EMULSION INTRAVENOUS at 11:09

## 2023-01-01 RX ADMIN — FUROSEMIDE 20 MG: 20 TABLET ORAL at 09:13

## 2023-01-01 RX ADMIN — CEFEPIME HYDROCHLORIDE 2 G: 2 INJECTION, POWDER, FOR SOLUTION INTRAVENOUS at 18:20

## 2023-01-01 RX ADMIN — PANTOPRAZOLE SODIUM 40 MG: 40 TABLET, DELAYED RELEASE ORAL at 06:13

## 2023-01-01 RX ADMIN — BALSALAZIDE DISODIUM 4500 MG: 750 CAPSULE ORAL at 09:13

## 2023-01-01 RX ADMIN — SERTRALINE HYDROCHLORIDE 150 MG: 100 TABLET ORAL at 09:13

## 2023-01-01 RX ADMIN — LISINOPRIL 10 MG: 10 TABLET ORAL at 09:29

## 2023-01-01 RX ADMIN — PHENYLEPHRINE HYDROCHLORIDE 100 MCG: 10 INJECTION INTRAVENOUS at 11:45

## 2023-01-01 RX ADMIN — VANCOMYCIN HYDROCHLORIDE 750 MG: 500 INJECTION, POWDER, LYOPHILIZED, FOR SOLUTION INTRAVENOUS at 05:36

## 2023-01-01 RX ADMIN — PHENYLEPHRINE HYDROCHLORIDE 100 MCG: 10 INJECTION INTRAVENOUS at 11:24

## 2023-01-01 RX ADMIN — CEFEPIME HYDROCHLORIDE 2 G: 2 INJECTION, POWDER, FOR SOLUTION INTRAVENOUS at 18:51

## 2023-01-01 RX ADMIN — PHENYLEPHRINE HYDROCHLORIDE 100 MCG: 10 INJECTION INTRAVENOUS at 11:34

## 2023-01-01 RX ADMIN — CEFEPIME HYDROCHLORIDE 2 G: 2 INJECTION, POWDER, FOR SOLUTION INTRAVENOUS at 06:13

## 2023-01-01 RX ADMIN — BALSALAZIDE DISODIUM 4500 MG: 750 CAPSULE ORAL at 09:15

## 2023-01-01 RX ADMIN — MIDAZOLAM 2 MG: 1 INJECTION INTRAMUSCULAR; INTRAVENOUS at 11:05

## 2023-01-01 RX ADMIN — CEFDINIR 300 MG: 300 CAPSULE ORAL at 14:23

## 2023-01-01 RX ADMIN — FUROSEMIDE 20 MG: 20 TABLET ORAL at 09:59

## 2023-01-01 RX ADMIN — ONDANSETRON 4 MG: 2 INJECTION INTRAMUSCULAR; INTRAVENOUS at 11:21

## 2023-01-01 RX ADMIN — INSULIN ASPART 1 UNITS: 100 INJECTION, SOLUTION INTRAVENOUS; SUBCUTANEOUS at 09:23

## 2023-01-01 RX ADMIN — VANCOMYCIN HYDROCHLORIDE 750 MG: 500 INJECTION, POWDER, LYOPHILIZED, FOR SOLUTION INTRAVENOUS at 20:37

## 2023-01-01 RX ADMIN — CEFEPIME HYDROCHLORIDE 2 G: 2 INJECTION, POWDER, FOR SOLUTION INTRAVENOUS at 19:07

## 2023-01-01 RX ADMIN — ALLOPURINOL 200 MG: 100 TABLET ORAL at 09:13

## 2023-01-01 RX ADMIN — METOPROLOL SUCCINATE 25 MG: 25 TABLET, EXTENDED RELEASE ORAL at 09:28

## 2023-01-01 RX ADMIN — PANTOPRAZOLE SODIUM 40 MG: 40 TABLET, DELAYED RELEASE ORAL at 07:02

## 2023-01-01 RX ADMIN — LIDOCAINE HYDROCHLORIDE 40 MG: 20 INJECTION, SOLUTION INFILTRATION; PERINEURAL at 11:09

## 2023-01-01 RX ADMIN — DEXTROSE AND SODIUM CHLORIDE: 5; 450 INJECTION, SOLUTION INTRAVENOUS at 01:25

## 2023-01-01 RX ADMIN — GADOBUTROL 11 ML: 604.72 INJECTION INTRAVENOUS at 20:45

## 2023-01-01 RX ADMIN — DOXYCYCLINE HYCLATE 100 MG: 100 CAPSULE ORAL at 14:23

## 2023-01-01 RX ADMIN — SERTRALINE HYDROCHLORIDE 150 MG: 100 TABLET ORAL at 09:58

## 2023-01-01 RX ADMIN — PANTOPRAZOLE SODIUM 40 MG: 40 TABLET, DELAYED RELEASE ORAL at 07:35

## 2023-01-01 RX ADMIN — METOPROLOL SUCCINATE 25 MG: 25 TABLET, EXTENDED RELEASE ORAL at 09:15

## 2023-01-01 RX ADMIN — FUROSEMIDE 20 MG: 20 TABLET ORAL at 09:27

## 2023-01-01 RX ADMIN — PHENYLEPHRINE HYDROCHLORIDE 100 MCG: 10 INJECTION INTRAVENOUS at 11:20

## 2023-01-01 RX ADMIN — SERTRALINE HYDROCHLORIDE 150 MG: 100 TABLET ORAL at 09:27

## 2023-01-01 RX ADMIN — ALLOPURINOL 200 MG: 100 TABLET ORAL at 09:26

## 2023-01-01 RX ADMIN — LISINOPRIL 20 MG: 20 TABLET ORAL at 09:59

## 2023-01-01 RX ADMIN — CEFEPIME HYDROCHLORIDE 2 G: 2 INJECTION, POWDER, FOR SOLUTION INTRAVENOUS at 07:37

## 2023-01-01 RX ADMIN — BALSALAZIDE DISODIUM 4500 MG: 750 CAPSULE ORAL at 09:57

## 2023-01-01 RX ADMIN — PHENYLEPHRINE HYDROCHLORIDE 100 MCG: 10 INJECTION INTRAVENOUS at 12:00

## 2023-01-01 RX ADMIN — Medication 5 MG: at 11:40

## 2023-01-01 RX ADMIN — FUROSEMIDE 20 MG: 20 TABLET ORAL at 22:30

## 2023-01-01 RX ADMIN — FUROSEMIDE 20 MG: 20 TABLET ORAL at 21:04

## 2023-01-01 RX ADMIN — PANTOPRAZOLE SODIUM 40 MG: 40 TABLET, DELAYED RELEASE ORAL at 06:51

## 2023-01-01 RX ADMIN — ALLOPURINOL 200 MG: 100 TABLET ORAL at 09:58

## 2023-01-01 RX ADMIN — PHENYLEPHRINE HYDROCHLORIDE 100 MCG: 10 INJECTION INTRAVENOUS at 11:48

## 2023-01-01 RX ADMIN — PHENYLEPHRINE HYDROCHLORIDE 100 MCG: 10 INJECTION INTRAVENOUS at 11:26

## 2023-01-01 RX ADMIN — CEFEPIME HYDROCHLORIDE 2 G: 2 INJECTION, POWDER, FOR SOLUTION INTRAVENOUS at 06:52

## 2023-01-01 RX ADMIN — PHENYLEPHRINE HYDROCHLORIDE 100 MCG: 10 INJECTION INTRAVENOUS at 11:53

## 2023-01-01 RX ADMIN — Medication 10 MG: at 11:44

## 2023-01-01 RX ADMIN — FERRIC CARBOXYMALTOSE INJECTION 750 MG: 50 INJECTION, SOLUTION INTRAVENOUS at 08:26

## 2023-01-01 RX ADMIN — VANCOMYCIN HYDROCHLORIDE 750 MG: 1 INJECTION, POWDER, LYOPHILIZED, FOR SOLUTION INTRAVENOUS at 10:53

## 2023-01-01 RX ADMIN — CEFEPIME HYDROCHLORIDE 2 G: 2 INJECTION, POWDER, FOR SOLUTION INTRAVENOUS at 18:36

## 2023-01-01 RX ADMIN — VANCOMYCIN HYDROCHLORIDE 750 MG: 500 INJECTION, POWDER, LYOPHILIZED, FOR SOLUTION INTRAVENOUS at 12:40

## 2023-01-01 RX ADMIN — VANCOMYCIN HYDROCHLORIDE 2500 MG: 10 INJECTION, POWDER, LYOPHILIZED, FOR SOLUTION INTRAVENOUS at 21:06

## 2023-01-01 RX ADMIN — Medication 10 MG: at 11:36

## 2023-01-01 RX ADMIN — SODIUM CHLORIDE 250 ML: 9 INJECTION, SOLUTION INTRAVENOUS at 08:22

## 2023-01-01 RX ADMIN — PHENYLEPHRINE HYDROCHLORIDE 100 MCG: 10 INJECTION INTRAVENOUS at 11:31

## 2023-01-01 RX ADMIN — BALSALAZIDE DISODIUM 4500 MG: 750 CAPSULE ORAL at 09:24

## 2023-01-01 RX ADMIN — FENTANYL CITRATE 50 MCG: 50 INJECTION INTRAMUSCULAR; INTRAVENOUS at 11:09

## 2023-01-01 RX ADMIN — DEXTROSE AND SODIUM CHLORIDE: 5; 450 INJECTION, SOLUTION INTRAVENOUS at 01:12

## 2023-01-01 RX ADMIN — VANCOMYCIN HYDROCHLORIDE 750 MG: 500 INJECTION, POWDER, LYOPHILIZED, FOR SOLUTION INTRAVENOUS at 04:25

## 2023-01-01 RX ADMIN — FUROSEMIDE 20 MG: 20 TABLET ORAL at 09:15

## 2023-01-01 RX ADMIN — SERTRALINE HYDROCHLORIDE 150 MG: 100 TABLET ORAL at 09:15

## 2023-01-01 ASSESSMENT — ACTIVITIES OF DAILY LIVING (ADL)
ADLS_ACUITY_SCORE: 36
ADLS_ACUITY_SCORE: 35
ADLS_ACUITY_SCORE: 36
ADLS_ACUITY_SCORE: 35
ADLS_ACUITY_SCORE: 36
ADLS_ACUITY_SCORE: 35
ADLS_ACUITY_SCORE: 36
ADLS_ACUITY_SCORE: 35
ADLS_ACUITY_SCORE: 36
ADLS_ACUITY_SCORE: 35
ADLS_ACUITY_SCORE: 36
ADLS_ACUITY_SCORE: 35

## 2023-01-01 ASSESSMENT — ENCOUNTER SYMPTOMS: DYSRHYTHMIAS: 1

## 2023-01-03 ENCOUNTER — OFFICE VISIT (OUTPATIENT)
Dept: CARDIOLOGY | Facility: CLINIC | Age: 63
End: 2023-01-03
Attending: INTERNAL MEDICINE
Payer: COMMERCIAL

## 2023-01-03 VITALS
HEART RATE: 91 BPM | WEIGHT: 273 LBS | DIASTOLIC BLOOD PRESSURE: 80 MMHG | HEIGHT: 71 IN | BODY MASS INDEX: 38.22 KG/M2 | SYSTOLIC BLOOD PRESSURE: 120 MMHG

## 2023-01-03 DIAGNOSIS — I48.0 PAROXYSMAL ATRIAL FIBRILLATION (H): Primary | ICD-10-CM

## 2023-01-03 PROCEDURE — 99214 OFFICE O/P EST MOD 30 MIN: CPT | Performed by: INTERNAL MEDICINE

## 2023-01-03 PROCEDURE — 93000 ELECTROCARDIOGRAM COMPLETE: CPT | Performed by: INTERNAL MEDICINE
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
## 2023-01-03 NOTE — PATIENT INSTRUCTIONS
Your blood pressure was elevated at your appointment today.  Elevated blood pressure can increase your risk of a heart attack, stroke and heart failure.  For this reason, we feel it is important to monitor your blood pressure closely.  If you have access to a home blood pressure monitor or are able to check your blood pressure at a local pharmacy in the next week we would like you to do so and call our clinic with those readings. Please call [phone] ([place]) and leave a message with your name, date of birth, blood pressure reading, date and location it was completed. If your blood pressure remains elevated your care team will be notified.  We appreciate being a part of your healthcare team and look forward to hearing from you soon.
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
## 2023-04-01 ENCOUNTER — HEALTH MAINTENANCE LETTER (OUTPATIENT)
Age: 63
End: 2023-04-01

## 2023-06-17 ENCOUNTER — HEALTH MAINTENANCE LETTER (OUTPATIENT)
Age: 63
End: 2023-06-17

## 2023-10-19 PROBLEM — M86.9 OSTEOMYELITIS OF GREAT TOE OF RIGHT FOOT (H): Status: ACTIVE | Noted: 2023-01-01

## 2023-10-19 NOTE — PHARMACY-ADMISSION MEDICATION HISTORY
Pharmacist Admission Medication History    Admission medication history is complete. The information provided in this note is only as accurate as the sources available at the time of the update.    Information Source(s): Patient, Clinic records, and [facility]    Pertinent Information:   -Matisa notes that he is on Eliquis, I appreciate in surescripts it was last filled 3/21/23 for 30 days supply. Cardiology notes suggest he should be on anticoagulation indefinitely.   -Matias was unsure if he is taking metoprolol. It has been filled consistently for the last year, last filled 8/25 x 90 days supply.     Changes made to PTA medication list:  Added: metoprolol, furosemide, Jardiance, allopurinol  Deleted: None  Changed: balsalazide - patient notes he takes all six tabs at once. Actos from 45 mg to 30 mg.     Allergies reviewed with patient and updates made in EHR: yes    Medication History Completed By: Joseline Dozier [facility] 10/19/2023 4:22 PM    PTA Med List   Medication Sig Last Dose    allopurinol (ZYLOPRIM) 100 MG tablet Take 200 mg by mouth daily 10/19/2023    apixaban ANTICOAGULANT (ELIQUIS) 5 MG tablet Take 1 tablet (5 mg) by mouth 2 times daily 10/19/2023 at am    balsalazide (COLAZAL) 750 MG capsule Take 4,500 mg by mouth daily 10/19/2023 at 1100    furosemide (LASIX) 20 MG tablet Take 20 mg by mouth 2 times daily 10/19/2023 at x2    JARDIANCE 25 MG TABS tablet Take 25 mg by mouth daily 10/19/2023 at am    lisinopril (ZESTRIL) 20 MG tablet Take 20 mg by mouth daily 10/19/2023    metoprolol succinate ER (TOPROL XL) 25 MG 24 hr tablet Take 25 mg by mouth daily Unknown    pantoprazole (PROTONIX) 40 MG EC tablet Take 40 mg by mouth daily 10/19/2023 at am    pioglitazone (ACTOS) 30 MG tablet Take 30 mg by mouth daily 10/19/2023    potassium chloride haja er (K-DUR) Take 10 mEq by mouth daily 10/19/2023    sertraline (ZOLOFT) 100 MG tablet Take 150 mg by mouth daily 10/19/2023

## 2023-10-19 NOTE — PHARMACY-VANCOMYCIN DOSING SERVICE
Pharmacy Vancomycin Initial Note  Date of Service 2023  Patient's  1960  63 year old, male    Indication: Skin and Soft Tissue Infection    Current estimated CrCl = Estimated Creatinine Clearance: 94.6 mL/min (based on SCr of 1 mg/dL).    Creatinine for last 3 days  10/19/2023: 11:46 AM Creatinine 1.00 mg/dL    Recent Vancomycin Level(s) for last 3 days  No results found for requested labs within last 3 days.      Vancomycin IV Administrations (past 72 hours)        No vancomycin orders with administrations in past 72 hours.                    Nephrotoxins and other renal medications (From now, onward)      Start     Dose/Rate Route Frequency Ordered Stop    10/20/23 0900  lisinopril (ZESTRIL) tablet 20 mg        Note to Pharmacy: PTA Sig:Take 20 mg by mouth daily      20 mg Oral DAILY 10/19/23 1801      10/20/23 0400  vancomycin (VANCOCIN) 750 mg in sodium chloride 0.9 % 250 mL intermittent infusion         750 mg  over 90 Minutes Intravenous EVERY 8 HOURS 10/19/23 1847      10/19/23 2100  furosemide (LASIX) tablet 20 mg        Note to Pharmacy: PTA Sig:Take 20 mg by mouth 2 times daily      20 mg Oral 2 TIMES DAILY 10/19/23 1801      10/19/23 2000  vancomycin (VANCOCIN) 2,500 mg in 0.9% NaCl 500 mL intermittent infusion         2,500 mg  over 120 Minutes Intravenous ONCE 10/19/23 1843              Contrast Orders - past 72 hours (72h ago, onward)      None            [employer] Prediction of Planned Initial Vancomycin Regimen  Loading dose: 2500 mg at 20:00 10/19/2023.  Regimen: 750 mg IV every 8 hours.  Start time: 18:48 on 10/19/2023  Exposure target: AUC24 (range)400-600 mg/L.hr   AUC24,ss: 504 mg/L.hr  Probability of AUC24 > 400: 73 %  Ctrough,ss: 17.9 mg/L  Probability of Ctrough,ss > 20: 40 %  Probability of nephrotoxicity (Lodise NALLELY ): 14 %        Plan:  Start vancomycin  2500 mg IV once, then 750 mg IV q8h.   Vancomycin monitoring method: AUC  Vancomycin therapeutic monitoring goal:  400-600 mg*h/L  Pharmacy will check vancomycin levels as appropriate in 1-3 Days.    Serum creatinine levels will be ordered daily for the first week of therapy and at least twice weekly for subsequent weeks.      Catarina Colon RPH

## 2023-10-19 NOTE — ED TRIAGE NOTES
Patient comes from podiatry with infected right big toe, has been causing issues for several weeks. Patient in need of IV antibiotics, possibly needs amputation. Hx of afib, DM2, neuropathy and takes ELiquis.

## 2023-10-19 NOTE — ED PROVIDER NOTES
"  History   Chief Complaint:  Toe Pain and Cellulitis     The history is provided by the patient.      Fer Myles is a 63 year old male with history of type 2 diabetes mellitus, hypertension, hyperlipidemia, and atrial fibrillation presenting for evaluation of toe pain and cellulitis. Fer reports a wound on his right big toe for which he was seen at a podiatry appointment earlier today and recommended to the ED. He states that he has been dressing the wound and applying ointment but it is not improving. He also notes feeling \"lousy\" for the past two months. He reports a history of low hemoglobin but denies a history of blood transfusion. He denies a history of MRSA.    Independent Historian:   None - Patient Only    Medications:    Colazal  Zestril  Protonix  Zoloft  Actos  Metformin  Zyloprim  Indocin    Past Medical History:    Alcohol abuse  Depression  GERD  Gout  Hypertension  HAKEEM  Paroxysmal atrial fibrillation  Subdural hematoma  Type 2 diabetes mellitus  Ulcerative colitis    Past Surgical History:    Closed reduction shoulder, left  EGD  Gastric bypass  Head and neck surgery  Colonoscopy    Physical Exam   Patient Vitals for the past 24 hrs:   BP Temp Temp src Pulse Resp SpO2 Height Weight   10/19/23 1937 127/82 97.5  F (36.4  C) Oral 67 18 99 % -- --   10/19/23 1803 132/86 97.5  F (36.4  C) Oral 61 18 -- -- --   10/19/23 1142 -- -- -- -- -- -- -- 111.1 kg (245 lb)   10/19/23 1140 128/77 97.8  F (36.6  C) Oral 75 24 99 % 1.803 m (5' 11\") --      Physical Exam  General: Alert, appears well-developed and well-nourished. Cooperative.     In no acute distress  HEENT:  Head:  Atraumatic  Ears:  External ears are normal  Mouth/Throat:  Oropharynx is without erythema or exudate and mucous membranes are moist.   Eyes:   Conjunctivae normal and EOM are normal. No scleral icterus.  CV:  Normal rate, normal heart sounds and radial pulses are 2+ and symmetric.    Resp:  Breath sounds are clear " bilaterally    Non-labored, no retractions or accessory muscle use  GI:  Abdomen is soft, no distension, no tenderness. No rebound or guarding.  No CVA tenderness bilaterally  MS:  Normal range of motion. No edema.    Normal strength in all 4 extremities.     Back atraumatic.    No midline cervical, thoracic, or lumbar tenderness  Skin:  Warm and dry.  There is a chronic wound concerning for cellulitis and potential underlying osteomyelitis of the great toe of the right foot.  See pictures below.  Neuro:   Alert. Normal strength.  Peripheral neuropathy with no sensation to the feet bilaterally.    Psych: Normal mood and affect.              Emergency Department Course   Imaging:  MR Foot Right w/o & w Contrast   Final Result   IMPRESSION:   1.  Ulceration involving the great toe with associated cellulitis.   2.  Osteomyelitis of the distal phalanx great toe.      XR Toe Right G/E 2 Views   Final Result   IMPRESSION: Lytic destructive changes involving the tuft of the distal phalanx of the great toe compatible with osteomyelitis. Moderate hallux valgus. Multiple hammertoe deformities. Moderate degenerative changes throughout the midfoot.         Laboratory:  Labs Ordered and Resulted from Time of ED Arrival to Time of ED Departure   COMPREHENSIVE METABOLIC PANEL - Abnormal       Result Value    Sodium 136      Potassium 5.3      Carbon Dioxide (CO2) 26      Anion Gap 10      Urea Nitrogen 7.7 (*)     Creatinine 1.00      GFR Estimate 84      Calcium 8.8      Chloride 100      Glucose 139 (*)     Alkaline Phosphatase 118      AST 30      ALT 14      Protein Total 7.2      Albumin 3.2 (*)     Bilirubin Total 0.3     CBC WITH PLATELETS AND DIFFERENTIAL - Abnormal    WBC Count 8.1      RBC Count 3.30 (*)     Hemoglobin 11.0 (*)     Hematocrit 34.6 (*)      (*)     MCH 33.3 (*)     MCHC 31.8      RDW 18.1 (*)     Platelet Count 451 (*)     % Neutrophils 68      % Lymphocytes 19      % Monocytes 9      Mids %  (Monos, Eos, Basos)        % Eosinophils 3      % Basophils 1      % Immature Granulocytes 0      NRBCs per 100 WBC 0      Absolute Neutrophils 5.5      Absolute Lymphocytes 1.5      Absolute Monocytes 0.8      Mids Abs (Monos, Eos, Basos)        Absolute Eosinophils 0.2      Absolute Basophils 0.1      Absolute Immature Granulocytes 0.0      Absolute NRBCs 0.0     CRP INFLAMMATION - Abnormal    CRP Inflammation 28.46 (*)    ERYTHROCYTE SEDIMENTATION RATE AUTO - Abnormal    Erythrocyte Sedimentation Rate 38 (*)    INR - Normal    INR 1.03     BLOOD CULTURE   BLOOD CULTURE     Emergency Department Course & Assessments:       Interventions:  Medications   allopurinol (ZYLOPRIM) tablet 200 mg (has no administration in time range)   furosemide (LASIX) tablet 20 mg (20 mg Oral $Given 10/19/23 2104)   lisinopril (ZESTRIL) tablet 20 mg (has no administration in time range)   metoprolol succinate ER (TOPROL XL) 24 hr tablet 25 mg (has no administration in time range)   balsalazide (COLAZAL) capsule 4,500 mg (has no administration in time range)   pantoprazole (PROTONIX) EC tablet 40 mg (has no administration in time range)   sertraline (ZOLOFT) tablet 150 mg (has no administration in time range)   melatonin tablet 1 mg (has no administration in time range)   ondansetron (ZOFRAN ODT) ODT tab 4 mg (has no administration in time range)     Or   ondansetron (ZOFRAN) injection 4 mg (has no administration in time range)   ceFEPIme (MAXIPIME) 2 g vial to attach to  mL bag for ADULTS or 50 mL bag for PEDS (2 g Intravenous $New Bag 10/19/23 1851)   glucose gel 15-30 g (has no administration in time range)     Or   dextrose 50 % injection 25-50 mL (has no administration in time range)     Or   glucagon injection 1 mg (has no administration in time range)   insulin aspart (NovoLOG) injection (RAPID ACTING) ( Subcutaneous Not Given 10/19/23 1851)   insulin aspart (NovoLOG) injection (RAPID ACTING) ( Subcutaneous Not Given  10/19/23 2210)   vancomycin (VANCOCIN) 2,500 mg in 0.9% NaCl 500 mL intermittent infusion (2,500 mg Intravenous $New Bag 10/19/23 2106)   vancomycin (VANCOCIN) 750 mg in sodium chloride 0.9 % 250 mL intermittent infusion (has no administration in time range)   cefTRIAXone (ROCEPHIN) 2 g vial to attach to  ml bag for ADULTS or NS 50 ml bag for PEDS (0 g Intravenous Stopped 10/19/23 1728)   gadobutrol (GADAVIST) injection 11 mL (11 mLs Intravenous $Given 10/19/23 2045)   sodium chloride (PF) 0.9% PF flush 10 mL (10 mLs Intravenous $Given 10/19/23 2045)      Assessments:  1534 I obtained history and examined the patient as noted above. I discussed findings and admission with the patient. All questions answered.     Independent Interpretation (X-rays, CTs, rhythm strip):  I independently reviewed x-ray imaging of the right great toe which shows no fracture.    Consultations/Discussion of Management or Tests:  ED Course as of 10/19/23 2225   Thu Oct 19, 2023   5928 I spoke with Dr. Goodrich of the hospitalist team regarding the patient, who accepted the patient for admission.     Social Determinants of Health affecting care:   None    Disposition:  The patient was admitted to the hospital under the care of Dr. Goodrich.     Impression & Plan    Medical Decision Making:  Patient is a 63-year-old male with a history of diabetes, hypertension, hyperlipidemia and atrial fibrillation on anticoagulation who presents with toe swelling redness and a wound.  Symptoms seem concerning for developing osteomyelitis of the right great toe.  Inflammatory markers are elevated.  Patient is high risk with history of diabetes.  Plan for IV antibiotics and admission to the hospital service for further evaluation of suspected osteomyelitis of the great toe of the right foot.  Podiatry to be consulted upon admission.    Diagnosis:    ICD-10-CM    1. Osteomyelitis of great toe of right foot (H)  M86.9            Nuha  Disclosure:  I, Dora Araujo, am serving as a scribe at 3:41 PM on 10/19/2023 to document services personally performed by Car Matthews MD based on my observations and the provider's statements to me.   10/19/2023   Car Matthews MD White, Scott, MD  10/19/23 8873

## 2023-10-19 NOTE — PROGRESS NOTES
RECEIVING UNIT ED HANDOFF REVIEW    ED Nurse Handoff Report was reviewed by: Kristine Springer RN on October 19, 2023 at 5:49 PM

## 2023-10-19 NOTE — PLAN OF CARE
Discharge Instructions for General Surgery   Activity  - No activity restrictions    Incisions  - Leave incision open to air.  Cover with gauze if needed for comfort or to protect clothing from drainage    Medications  - Do not take any additional Tylenol (acetaminophen) while using a narcotic pain medication which includes acetaminophen  - Do not take more than 4,000mg of acetaminophen in any 24 hour period, as this can cause liver damage  - Take stool softeners such as Senna or Miralax while you are using narcotics, but stop if you develop diarrhea  - Wean yourself off of narcotic pain medications    Follow-Up:  - Call your primary care provider to touch base regarding your recent admission.    - Call or return sooner than your regularly scheduled visit if you develop any of the following: fever >101.5, uncontrolled pain, uncontrolled nausea or vomiting, as well as increased redness, swelling, or drainage from your wound.   -  A nurse from the General Surgery Clinic will contact you 24 hours, or next business day, after your discharge from the hospital.  If you have questions or to schedule a follow up appointment please call the General Surgery Clinic at [phone].  Call [phone] and ask to speak with the Surgery resident on call if you are having troubles in the evenings, at night, or on the weekend.  -  If you are receiving home care please inform your home care nurse of our contact number.         Right buttock wound(s): Daily - if wound starts draining  While wound not open ok to leave open to air but continue to wash with antibacterial soap  If opens also apply small amount of medihoney to waterproof bandage and place over wound    BMT CLINIC FOLLOW UP  8/3 915a lab, [address], [address] [facility], Micafungin infusion  8/4 12p [facility], Micafungin infusion  Daily Micafungin infusion time TBD.    BMT Contact Information  For issues including fevers 100.5 or more:  Please call during the week: Monday through  Goal Outcome Evaluation:    Pt arrived to the unit around 1600. A&O x4. VSS on RA. Denies pain/sob/n/v/cp. Settled. Oriented to the unit. MRI checklist done. Abx given. BG check. Report off to on-coming RN.                   Friday between hours of 8:00 am and 4:30 pm- Call BMT office- [phone]  After hours/Weekends: Please call [facility]  and ask for BMT physician on call and the  will have the BMT Fellow Physician call you back: [phone]    [facility] phone #[phone]

## 2023-10-19 NOTE — ED NOTES
[facility]  ED Nurse Handoff Report    ED Chief complaint: Toe Pain and Cellulitis      ED Diagnosis:   Final diagnoses:   Osteomyelitis of great toe of right foot (H)       Code Status: Full Code    Allergies:   Allergies   Allergen Reactions    Amoxicillin Rash       Patient Story: Patient comes from podiatry with infected right big toe, has been causing issues for several weeks. Patient in need of IV antibiotics, possibly needs amputation. Hx of afib, DM2, neuropathy and takes ELiquis.   Focused Assessment:    Labs Ordered and Resulted from Time of ED Arrival to Time of ED Departure   COMPREHENSIVE METABOLIC PANEL - Abnormal       Result Value    Sodium 136      Potassium 5.3      Carbon Dioxide (CO2) 26      Anion Gap 10      Urea Nitrogen 7.7 (*)     Creatinine 1.00      GFR Estimate 84      Calcium 8.8      Chloride 100      Glucose 139 (*)     Alkaline Phosphatase 118      AST 30      ALT 14      Protein Total 7.2      Albumin 3.2 (*)     Bilirubin Total 0.3     CBC WITH PLATELETS AND DIFFERENTIAL - Abnormal    WBC Count 8.1      RBC Count 3.30 (*)     Hemoglobin 11.0 (*)     Hematocrit 34.6 (*)      (*)     MCH 33.3 (*)     MCHC 31.8      RDW 18.1 (*)     Platelet Count 451 (*)     % Neutrophils 68      % Lymphocytes 19      % Monocytes 9      Mids % (Monos, Eos, Basos)        % Eosinophils 3      % Basophils 1      % Immature Granulocytes 0      NRBCs per 100 WBC 0      Absolute Neutrophils 5.5      Absolute Lymphocytes 1.5      Absolute Monocytes 0.8      Mids Abs (Monos, Eos, Basos)        Absolute Eosinophils 0.2      Absolute Basophils 0.1      Absolute Immature Granulocytes 0.0      Absolute NRBCs 0.0     CRP INFLAMMATION - Abnormal    CRP Inflammation 28.46 (*)    ERYTHROCYTE SEDIMENTATION RATE AUTO - Abnormal    Erythrocyte Sedimentation Rate 38 (*)    INR - Normal    INR 1.03     BLOOD CULTURE   BLOOD CULTURE     XR Toe Right G/E 2 Views   Final Result   IMPRESSION: Lytic  "destructive changes involving the tuft of the distal phalanx of the great toe compatible with osteomyelitis. Moderate hallux valgus. Multiple hammertoe deformities. Moderate degenerative changes throughout the midfoot.        Medications   cefTRIAXone (ROCEPHIN) 2 g vial to attach to  ml bag for ADULTS or NS 50 ml bag for PEDS (0 g Intravenous Stopped 10/19/23 1728)             Does this patient have any cognitive concerns?:  NONE    Activity level - Baseline/Home:  Independent  Activity Level - Current:   Independent    Patient's Preferred language: English   Needed?: No    Isolation: None  Infection: Not Applicable  Patient tested for COVID 19 prior to admission: NO  Bariatric?: No    Vital Signs:   Vitals:    10/19/23 1140 10/19/23 1142   BP: 128/77    Pulse: 75    Resp: 24    Temp: 97.8  F (36.6  C)    TempSrc: Oral    SpO2: 99%    Weight:  111.1 kg (245 lb)   Height: 1.803 m (5' 11\")        Cardiac Rhythm:     Was the PSS-3 completed:   Yes  What interventions are required if any?               Family Comments: NONE      For the majority of the shift this patient's behavior was Green.   Behavioral interventions performed were NONE.    ED NURSE PHONE NUMBER: Lis *[id number]         "
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
## 2023-10-20 NOTE — PROGRESS NOTES
Patient is A&Ox4, Denies pain or discomfort. VSS on RA, on CPAP while asleep. Walks to the bathroom independently. IV abx was given as scheduled tolerating well. BG check with parameters for insulin. Went MRI around 8pm. On NPO since MN.

## 2023-10-20 NOTE — PROGRESS NOTES
Therapy: IV ABX  Insurance: [employer]   Ded: Does not apply    Co-Insurance: 100/0  Max Out of Pocket: Does not apply    Misc: Pt may have an additional copay upon dispense    In reference to referral from ARAM Fishman on pt admitted 10/19/23 to check for IV ABX coverage.    Please contact Intake with any questions, [phone] or In Basket pool,  Home [facility]fusion ([phone]).
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
## 2023-10-20 NOTE — PROGRESS NOTES
[facility]    Received request for benefit check should pt require home IV abx. Pt Fer Myles has coverage for IV ABX through  their [facility] plan. Pt has no deductible or out of pocket that applies and should be covered at 100%. They may however have an additional copay that applies.    Thank you    Nia Larsen RN  [facility] Liaison  [phone] (Mon thru Fri 8am - 5pm)  [phone] Office

## 2023-10-21 NOTE — PROGRESS NOTES
[facility]    Hospitalist Progress Note    Date of Service (when I saw the patient): 10/21/2023    Assessment & Plan   Fer Myles is a pleasant 63 year old gentleman with history of GERD, A-fib on Eliquis, T2DM, HAKEEM on CPAP, HTN, hx of gastric bypass, depression and ulcerative colitis - who presented to the ED on 10/19/23 from his Podiatry clinic for further evaluation of toe pain and cellulitis.   Current problems include:     Osteomyelitis and Cellulitis of the Right great toe   Right hallux ulcer  Patient is a diabetic with peripheral neuropathy. Wound has been present on his toe for 4-5 months and it has gotten worse in the past month. Went to his podiatrist who recommend further evaluation. Right toe xray is showing lytic destructive changes involving the tuft of the distal phalanx of the great toe compatible with osteomyelitis. CRP and ESR are both elevated. Wound is draining serosanguinous fluid.   - MRI Right Foot: 1.  Ulceration involving the great toe with associated cellulitis;  2.  Osteomyelitis of the distal phalanx great toe.  - follow up Blood cx  - appreciate eval. By Podiatry today   - continue Cefepime and Vancomycin   - resume NPO at midnight for surgery with Dr. Max (Podiatry) 10/21 a.m.:  - planned: partial vs full amputation of hallux.   - WBAT to RLE      Chronic Medical Problems:      T2DM  - holding home Jardiance and Pioglitazone   - continue LDSSI   - begin D5 0.45 NS overnight, while NPO.     HTN  - continue home Lisinopril 20mg daily and Lasix 20mg BID      A-fib on Eliquis  - holding home Eliquis; last dose 10/19 morning   - continue home Metoprolol      Depression  - continue home Zoloft      GERD  - continue home Protonix      Ulcerative colitis  - continue home Colazal      Gout  - continue home Allopurinol      HAKEEM  - continue home CPAP      Clinically Significant Risk Factors Present on Admission     Hypoalbuminemia: Lowest albumin = 3.2 g/dL at  "10/19/2023 11:46 AM, will monitor as appropriate  Drug Induced Coagulation Defect: home medication list includes an anticoagulant medication       Obesity: Estimated body mass index is 34.17 kg/m  as calculated from the following:    Height as of this encounter: 1.803 m (5' 11\").    Weight as of this encounter: 111.1 kg (245 lb).              Diet: NPO per Anesthesia Guidelines for Procedure/Surgery Except for: Meds  - earlier today was on: Moderate Consistent Carb (60 g CHO per Meal) DietCarb consistent   DVT Prophylaxis: Pneumatic Compression Devices  Beltran Catheter: Not present  Lines: None     Cardiac Monitoring: None  Code Status: Full Code      Disposition Plan     Expected Discharge Date: TBD post-surgery.     Disposition: Expected discharge in 1-2 days.      SWAPNA Dockery MD, [facility] Hospitalist  Text Page (7am - 6pm)    Interval History   No reports of pain today.  No f/c/SOB.  Up walking independently in his room.    Data reviewed today: I reviewed all new labs and imaging results over the last 24 hours.     Physical Exam   Temp: 99.2  F (37.3  C) Temp src: Oral BP: 107/71 Pulse: 78   Resp: 16 SpO2: 99 % O2 Device: None (Room air)    Vitals:    10/19/23 1142   Weight: 111.1 kg (245 lb)     Vital Signs with Ranges  Temp:  [98.3  F (36.8  C)-99.2  F (37.3  C)] 99.2  F (37.3  C)  Pulse:  [65-78] 78  Resp:  [16-18] 16  BP: (107-127)/(70-77) 107/71  SpO2:  [97 %-99 %] 99 %  I/O last 3 completed shifts:  In: 480 [P.O.:480]  Out: -     Constitutional: awake, no apparent distress; lying in bed  HEENT: sclerae clear; MM's moist  Respiratory: good a/e bilaterally, no wheezing or rhonchi  Cardiovascular: Regular rate and rhythm, S1, S2 noted; no m/r/g  GI: abdomen flat, + bowel sounds; soft, non-tender, non-distended  Skin/Integumen: no rashes, no cyanosis, no jaundice  Musculoskeletal: no edema  Neurologic: follows directions well; no focal deficits      Medications      allopurinol  200 mg Oral " Daily    balsalazide  4,500 mg Oral Daily    ceFEPIme  2 g Intravenous Q12H    furosemide  20 mg Oral BID    insulin aspart  1-3 Units Subcutaneous TID AC    insulin aspart  1-3 Units Subcutaneous At Bedtime    lisinopril  20 mg Oral Daily    metoprolol succinate ER  25 mg Oral Daily    pantoprazole  40 mg Oral Daily    sertraline  150 mg Oral Daily    vancomycin  750 mg Intravenous Q8H       Data   Recent Labs   Lab 10/20/23  2123 10/20/23  1703 10/20/23  1107 10/20/23  0801 10/20/23  0646 10/19/23  1821 10/19/23  1146   WBC  --   --   --   --   --   --  8.1   HGB  --   --   --   --   --   --  11.0*   MCV  --   --   --   --   --   --  105*   PLT  --   --   --   --   --   --  451*   INR  --   --   --   --   --   --  1.03   NA  --   --   --   --   --   --  136   POTASSIUM  --   --   --   --  4.3  --  5.3   CHLORIDE  --   --   --   --   --   --  100   CO2  --   --   --   --   --   --  26   BUN  --   --   --   --   --   --  7.7*   CR  --   --   --   --  1.00  --  1.00   ANIONGAP  --   --   --   --   --   --  10   RENATE  --   --   --   --   --   --  8.8   * 113* 117*   < >  --    < > 139*   ALBUMIN  --   --   --   --   --   --  3.2*   PROTTOTAL  --   --   --   --   --   --  7.2   BILITOTAL  --   --   --   --   --   --  0.3   ALKPHOS  --   --   --   --   --   --  118   ALT  --   --   --   --   --   --  14   AST  --   --   --   --   --   --  30    < > = values in this interval not displayed.

## 2023-10-21 NOTE — PLAN OF CARE
Goal Outcome Evaluation:  Alert and oriented X4, VSS on RA. Independent in the room.CPAP during the night. NPO after midnight. Dextrose 5 % and .45 NaCl infusing 75 ml/hr.

## 2023-10-21 NOTE — PHARMACY-VANCOMYCIN DOSING SERVICE
Pharmacy Vancomycin Note  Date of Service 2023  Patient's  1960   63 year old, male    Indication: Osteomyelitis and Skin and Soft Tissue Infection  Day of Therapy: 3  Current vancomycin regimen:  750 mg IV q8h  Current vancomycin monitoring method: AUC  Current vancomycin therapeutic monitoring goal: 400-600 mg*h/L      Creatinine for last 3 days  10/19/2023: 11:46 AM Creatinine 1.00 mg/dL  10/20/2023:  6:46 AM Creatinine 1.00 mg/dL  10/21/2023:  8:25 AM Creatinine 1.00 mg/dL;  8:25 AM Creatinine 1.00 mg/dL    Recent Vancomycin Levels (past 3 days)  10/21/2023:  8:25 AM Vancomycin 32.0 ug/mL    Vancomycin IV Administrations (past 72 hours)                     vancomycin (VANCOCIN) 750 mg in sodium chloride 0.9 % 250 mL intermittent infusion (mg) 750 mg New Bag 10/21/23 0536     750 mg New Bag 10/20/23 2037     750 mg New Bag  1240     750 mg New Bag  0425    vancomycin (VANCOCIN) 2,500 mg in 0.9% NaCl 500 mL intermittent infusion (mg) 2,500 mg New Bag 10/19/23 210                    Nephrotoxins and other renal medications (From now, onward)      Start     Dose/Rate Route Frequency Ordered Stop    10/20/23 0900  lisinopril (ZESTRIL) tablet 20 mg        Note to Pharmacy: PTA Sig:Take 20 mg by mouth daily      20 mg Oral DAILY 10/19/23 1801      10/20/23 0400  vancomycin (VANCOCIN) 750 mg in sodium chloride 0.9 % 250 mL intermittent infusion         750 mg  over 90 Minutes Intravenous EVERY 8 HOURS 10/19/23 1847      10/19/23 2100  furosemide (LASIX) tablet 20 mg        Note to Pharmacy: PTA Sig:Take 20 mg by mouth 2 times daily      20 mg Oral 2 TIMES DAILY 10/19/23 1801                 Contrast Orders - past 72 hours (72h ago, onward)      Start     Dose/Rate Route Frequency Stop    10/19/23 2000  gadobutrol (GADAVIST) injection 11 mL         11 mL Intravenous ONCE 10/19/23 204            Interpretation of levels and current regimen:  Vancomycin level is reflective of  level was drawn at  incorrect time as the last dose was hung at 0536, so this level is not a trough level and it was drawn post infusion phase.    Has serum creatinine changed greater than 50% in last 72 hours: No    Urine output:  unable to determine    Renal Function: Stable    IPlan:  Will get level 1100 today to determine correct dose  Vancomycin monitoring method: AUC  Vancomycin therapeutic monitoring goal: 400-600 mg*h/L  Pharmacy will check vancomycin levels as appropriate today.  Serum creatinine levels will be ordered a minimum of twice weekly.    Conner James RPH

## 2023-10-21 NOTE — ANESTHESIA CARE TRANSFER NOTE
Patient: Fer Myles    Procedure: Procedure(s):  Right foot hallux amputation       Diagnosis: Osteomyelitis of great toe of right foot (H) [M86.9]  Diagnosis Additional Information: No value filed.    Anesthesia Type:   MAC     Note:    Oropharynx: oropharynx clear of all foreign objects and spontaneously breathing  Level of Consciousness: awake  Oxygen Supplementation: face mask  Level of Supplemental Oxygen (L/min / FiO2): 6  Independent Airway: airway patency satisfactory and stable  Dentition: dentition unchanged  Vital Signs Stable: post-procedure vital signs reviewed and stable  Report to RN Given: handoff report given  Patient transferred to: PACU    Handoff Report: Identifed the Patient, Identified the Reponsible Provider, Reviewed the pertinent medical history, Discussed the surgical course, Reviewed Intra-OP anesthesia mangement and issues during anesthesia, Set expectations for post-procedure period and Allowed opportunity for questions and acknowledgement of understanding      Vitals:  Vitals Value Taken Time   BP     Temp     Pulse 70 10/21/23 1205   Resp 9 10/21/23 1205   SpO2 100 % 10/21/23 1205   Vitals shown include unfiled device data.    Electronically Signed By: OLGA Johnson CRNA  October 21, 2023  12:07 PM

## 2023-10-21 NOTE — ANESTHESIA POSTPROCEDURE EVALUATION
Patient: Fer Myles    Procedure: Procedure(s):  Right foot hallux amputation       Anesthesia Type:  MAC    Note:  Disposition: Inpatient   Postop Pain Control: Uneventful            Sign Out: Well controlled pain   PONV: No   Neuro/Psych: Uneventful            Sign Out: Acceptable/Baseline neuro status   Airway/Respiratory: Uneventful            Sign Out: Acceptable/Baseline resp. status   CV/Hemodynamics: Uneventful            Sign Out: Acceptable CV status; No obvious hypovolemia; No obvious fluid overload   Other NRE: NONE   DID A NON-ROUTINE EVENT OCCUR? No           Last vitals:  Vitals Value Taken Time   /78 10/21/23 1230   Temp 36.3  C (97.4  F) 10/21/23 1224   Pulse 58 10/21/23 1235   Resp 21 10/21/23 1236   SpO2 97 % 10/21/23 1237   Vitals shown include unfiled device data.    Electronically Signed By: MORENA TORRES MD  October 21, 2023  2:14 PM
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
## 2023-10-21 NOTE — OP NOTE
Operative Report    October 21, 2023        SURGEON:  Petros Max, DUNG, FACFAS, MS    PREOPERATIVE DIAGNOSIS:   1) osteomyelitis distal phalanx, right hallux  2) type 2 diabetes with peripheral neuropathy     POSTOPERATIVE DIAGNOSIS: Same    PROCEDURE(S):  1) partial amputation of the right hallux at the level just behind the interphalangeal joint  2) complex closure with skin flap creation    ANESTHESIA: MAC with local    HEMOSTASIS: Electrocautery    ESTIMATED BLOOD LOSS: 10 mL    MATERIALS absorbable suture material    INJECTABLES:  0.5% Marcaine injected pre-operatively    COMPLICATIONS:   None apparent    INTRAOPERATIVE FINDINGS: Tissues at the level of amputation, just proximal to the interphalangeal joint, appear grossly viable and free of infection.    INDICATIONS FOR SURGERY:  Fer Myles is a 63-year-old male with type 2 diabetes, peripheral neuropathy, GERD, A-fib on Eliquis, HAKEEM, hypertension, ulcerative colitis, history of gastric bypass, depression, who presented to the emergency department on 10/19/2023, per the direction of his podiatrist, due to deterioration of a right great toe wound and concern for infection.  Clinical exam, x-ray and MRI supporting osteomyelitis of the distal phalanx, right hallux.  Amputation at some level, right hallux, was recommended.  This was discussed in detail by my partner Dr. Diallo and reviewed again today preoperatively.  No guarantees were given.  I explained that the goal of surgery is to not only remove all infected tissue, but to also achieve primary closure. Infection and available viable skin dictates the level of amputation.  Closure is complicated by the large plantar medial wound on this toe.  Matias stated understanding.    PROCEDURE: The patient was transferred to the operating room and placed supine on the operating table.  IV sedation was initiated.  A timeout was called and local anesthetic injected into the right foot.  The right foot was then  prepped and draped in the normal aseptic fashion.  A second timeout for the procedure was called.    A modified fishmouth type incision was made around the right great toe proximal to the interphalangeal joint.  This was extended as far distally on the dorsal and lateral surface, to preserve viable skin, as the majority of the plantar medial toe was ulcerative.  Incision was taken through full-thickness to the level of bone.  The toe was then disarticulated at the interphalangeal joint.  A sagittal saw was used to resect  at level of metaphyseal flares and remove part of the proximal phalangeal head.  Bleeding vessels were electrocauterized.  The wound was irrigated with a copious amount of normal sterile saline.    Skin flap that was created from the dorsal lateral skin measured approximately 2 cm x 2 cm.  It was rotated dorsal and medial and complete closure was achievable without tension.  Closure was achieved with 4-0 Prolene.    A well-padded compressive dressing was placed.  The patient tolerated anesthesia procedure well.    Bone harvested from deep to the ulceration and sent for aerobic and anaerobic culture.  The remainder of the toe was sent to pathology for histological analysis.    Petros Max DPM, FACFAS, MS  [facility] Department of Podiatry/Foot & Ankle Surgery  [phone]

## 2023-10-21 NOTE — BRIEF OP NOTE
[facility]    Brief Operative Note    Pre-operative diagnosis: Osteomyelitis of great toe of right foot (H) [M86.9]  Post-operative diagnosis Same as pre-operative diagnosis    Procedure: Right foot hallux amputation, Right - Toe    Surgeon: Surgeon(s) and Role:     * Petros Max DPM - Primary  Anesthesia: MAC   Estimated Blood Loss: 10 mL from 10/21/2023 11:05 AM to 10/21/2023 12:04 PM      Drains: None  Specimens:   ID Type Source Tests Collected by Time Destination   1 : Right great toe Tissue Toe, Right SURGICAL PATHOLOGY EXAM Petros Max DPM 10/21/2023 11:56 AM    A : Right great toe Tissue Toe, Right ANAEROBIC BACTERIAL CULTURE ROUTINE, AEROBIC BACTERIAL CULTURE ROUTINE Petros Max DPM 10/21/2023 11:55 AM      Findings:   All tissues at the level of amputation appear grossly viable and free of infection.  Complications: None.  Implants: * No implants in log *    Plan:  Continue IV vancomycin and cefepime   Aerobic and anaerobic bone cultures pending  All infection is considered resected, as the entire distal phalanx was removed, as well as the head of the proximal phalanx.  Podiatry will follow up tomorrow for a dressing change and check of the surgical site.    Petros Max DPM, CAMILO, MS  [facility] Department of Podiatry/Foot & Ankle Surgery  [phone]

## 2023-10-21 NOTE — PROGRESS NOTES
Pt A&Ox4.Vitals stable. Denies pain.Las BS checked was 113, no insulin during this shift.uses BR independently.IV antibiotic intermittent.Uses CPAP while sleep.Procedure schedule for tomorrow.More about pt plan of care see the notes.

## 2023-10-22 NOTE — PROGRESS NOTES
[facility]    Hospitalist Progress Note    Date of Service (when I saw the patient): 10/22/2023    Assessment & Plan   Fer Myles is a pleasant 63 year old gentleman with history of GERD, A-fib on Eliquis, DM type II, HAKEEM on CPAP, HTN, hx of gastric bypass, depression and ulcerative colitis - who presented to the ED on 10/19/23 from his Podiatry clinic for further evaluation of toe pain and cellulitis.   Current problems include:     Osteomyelitis and Cellulitis of Right great toe   Right hallux ulcer  Right foot hallux amputation today by Dr. REDD Max, Podiatry  Per admit H & P: is a diabetic with peripheral neuropathy. Wound has been present on his toe for 4-5 months and it has gotten worse in the past month. Went to his podiatrist who recommend further evaluation. Right toe xray is showing lytic destructive changes involving the tuft of the distal phalanx of the great toe compatible with osteomyelitis. CRP and ESR are both elevated. Wound is draining serosanguinous fluid.   MRI Right Foot: 1.  Ulceration involving the great toe with associated cellulitis;  2.  Osteomyelitis of the distal phalanx great toe.  Blood cx: NGTD  - Gollow tissue cultures.  - continue Cefepime and Vancomycin   - Further plan per pidiatry.        T2DM  - holding home Jardiance and Pioglitazone   - continue LDSSI   - decrease IVF to TKO after surgery, if PO intake adequate     HTN  - Resume lisinopril at a lower dose.   - continue Lasix 20 mg BID     Hyperkalemia, mild. Resolved. 3.8 this am.  - may be related to sample collection  - repeat BMP in am.     A-fib on Eliquis  - holding home Eliquis; last dose 10/19 morning. Resume when ok with podiatry.  - continue home Metoprolol      Depression  - continue home Zoloft      GERD  - continue home Protonix      Ulcerative colitis  - continue home Colazal      Gout  - continue home Allopurinol      HAKEEM  - continue home CPAP        Diet: Consistent Carb (60 g CHO  per Meal) DietCarb consistent   DVT Prophylaxis: Pneumatic Compression Devices.  Resume eliquis when ok with podiatry.  Beltran Catheter: Not present  Lines: None     Cardiac Monitoring: None  Code Status: Full Code      Disposition Plan     Expected Discharge Date: TBD post-surgery.     Disposition: Expected discharge in 1-2 days. Pending tissue cultures and podiatry clearance.      Parker Mcmullen MD, [place] Hospitalist      Interval History   Pain is well controlled. Denies chest pain and sob. Oriented x3/    Data reviewed today: I reviewed all new labs and imaging results over the last 24 hours.     Physical Exam   Temp: 98  F (36.7  C) Temp src: Oral BP: 129/75 Pulse: 58   Resp: 16 SpO2: 96 % O2 Device: BiPAP/CPAP Oxygen Delivery: 6 LPM  Vitals:    10/19/23 1142   Weight: 111.1 kg (245 lb)     Vital Signs with Ranges  Temp:  [97.3  F (36.3  C)-98  F (36.7  C)] 98  F (36.7  C)  Pulse:  [56-70] 58  Resp:  [11-20] 16  BP: (101-129)/(62-80) 129/75  SpO2:  [95 %-100 %] 96 %  I/O last 3 completed shifts:  In: 980 [P.O.:480; I.V.:500]  Out: 10 [Blood:10]    Constitutional: awake, no apparent distress; lying in bed  HEENT: sclerae clear; MM's moist  Respiratory: good a/e bilaterally, no wheezing or rhonchi  Cardiovascular: Regular rate and rhythm, S1, S2 noted; no m/r/g  GI: abdomen flat, + bowel sounds; soft, non-tender, non-distended  Skin/Integumen: Right foot in protective dressing; no rashes, no cyanosis, no jaundice  Musculoskeletal: no edema  Neurologic: follows directions well; no focal deficits      Medications    dextrose 5% and 0.45% NaCl 75 mL/hr at 10/22/23 0125      allopurinol  200 mg Oral Daily    balsalazide  4,500 mg Oral Daily    ceFEPIme  2 g Intravenous Q12H    furosemide  20 mg Oral BID    insulin aspart  1-3 Units Subcutaneous TID AC    insulin aspart  1-3 Units Subcutaneous At Bedtime    [Held by provider] lisinopril  20 mg Oral Daily    metoprolol succinate ER  25 mg Oral Daily     pantoprazole  40 mg Oral Daily    sertraline  150 mg Oral Daily    vancomycin  750 mg Intravenous Q12H       Data   Recent Labs   Lab 10/22/23  0755 10/22/23  0712 10/22/23  0221 10/21/23  1215 10/21/23  0825 10/20/23  0801 10/20/23  0646 10/19/23  1821 10/19/23  1146   WBC  --   --   --   --  6.8  --   --   --  8.1   HGB  --  9.6*  --   --  10.1*  --   --   --  11.0*   MCV  --   --   --   --  104*  --   --   --  105*   PLT  --   --   --   --  352  --   --   --  451*   INR  --   --   --   --   --   --   --   --  1.03   NA  --  137  --   --  137  --   --   --  136   POTASSIUM  --  3.8  --   --  5.4*  --  4.3  --  5.3   CHLORIDE  --  103  --   --  101  --   --   --  100   CO2  --  24  --   --  27  --   --   --  26   BUN  --  9.2  --   --  8.4  --   --   --  7.7*   CR  --  0.97  --   --  1.00  1.00  --  1.00  --  1.00   ANIONGAP  --  10  --   --  9  --   --   --  10   RENATE  --  8.6*  --   --  9.0  --   --   --  8.8   * 127* 127*   < > 125*   < >  --    < > 139*   ALBUMIN  --   --   --   --   --   --   --   --  3.2*   PROTTOTAL  --   --   --   --   --   --   --   --  7.2   BILITOTAL  --   --   --   --   --   --   --   --  0.3   ALKPHOS  --   --   --   --   --   --   --   --  118   ALT  --   --   --   --   --   --   --   --  14   AST  --   --   --   --   --   --   --   --  30    < > = values in this interval not displayed.

## 2023-10-22 NOTE — PROGRESS NOTES
[facility]      ASSESSMENT:  63-year-old male with type 2 diabetes, peripheral neuropathy, GERD, A-fib on Eliquis, HAKEEM, hypertension, ulcerative colitis, history of gastric bypass, depression, who presented to the emergency department on 10/19/2023, per the direction of his podiatrist, due to deterioration of a right great toe wound and concern for infection.     Osteomyelitis, distal phalanx, right hallux status post partial amputation and flap closure 10/21/23.     MEDICAL DECISION MAKING:  Surgical site stable - no clinical signs of infection  I personally reviewed the post op XR.   Sterile re-dress of right foot  Await bone culture results  Continue IV cefepime and vancomycin  All bone infection is considered removed  Activity: weight bearing in surgical shoe  Dr. Diallo will resume inpatient care tomorrow.    Petros Max DPM, FACFAS, MS  [facility] Department of Podiatry/Foot & Ankle Surgery  [phone]      _______________________________________________________________________      SUBJECTIVE:  Matias reports severe diarrhea this morning    EXAM:    B/P: 129/75, T: 98, P: 58, R: 16    Dermatological: the right hallux amputation site is stable  Incision coapted  Sutures intact  [place] of skin flap viable  No erythema.  Serosanguinous drainage    Surgical pathology and bone cultures pending    EXAM: XR FOOT PORT RIGHT 3 VIEWS  LOCATION: [facility]  DATE: 10/21/2023     INDICATION: post op   partial right hallux amputation  COMPARISON: None.                                                                  IMPRESSION: No fracture or acute osteomyelitis. Partial amputation of the great toe at the level of the distal portion of the proximal phalanx. Moderate degenerative changes throughout the midfoot. Hammertoe deformities. Small plantar and Achilles' heel   spurs.    Labs:     Lab Results   Component Value Date    INR 1.03 10/19/2023    INR 1.17  "12/08/2009     No components found for: \"ESR\"  @BREIFLAB(crp)@    Lab Results   Component Value Date    WBC 6.8 10/21/2023    WBC 7.9 03/04/2021     Lab Results   Component Value Date    RBC 3.04 10/21/2023    RBC 3.89 03/04/2021     Lab Results   Component Value Date    HGB 9.6 10/22/2023    HGB 12.7 03/04/2021     Lab Results   Component Value Date    HCT 31.5 10/21/2023    HCT 39.3 03/04/2021     No components found for: \"MCT\"  Lab Results   Component Value Date     10/21/2023     03/04/2021     Lab Results   Component Value Date    MCH 33.2 10/21/2023    MCH 32.6 03/04/2021     Lab Results   Component Value Date    MCHC 32.1 10/21/2023    MCHC 32.3 03/04/2021     Lab Results   Component Value Date    RDW 17.9 10/21/2023    RDW 15.6 03/04/2021     Lab Results   Component Value Date     10/21/2023     03/04/2021       All cultures:  No results for input(s): \"CULT\" in the last 168 hours.    Hemoglobin   Date Value Ref Range Status   10/22/2023 9.6 (L) 13.3 - 17.7 g/dL Final   03/04/2021 12.7 (L) 13.3 - 17.7 g/dL Final         "

## 2023-10-22 NOTE — PLAN OF CARE
Goal Outcome Evaluation:    A&Ox4. VSS on RA. CPAP on at bedtime. CMS intact ex baseline numbness/tingling on BLE. Ace wrap dressing on R foot CDI. R foot elevated on pillows. Denies pain. Up independently in room. Voids adequately. PIV infusing. BG monitored. Will continue to monitor.

## 2023-10-22 NOTE — PROGRESS NOTES
[facility]    Hospitalist Progress Note    Date of Service (when I saw the patient): 10/22/2023    Assessment & Plan   Fer Myles is a pleasant 63 year old gentleman with history of GERD, A-fib on Eliquis, DM type II, HAKEEM on CPAP, HTN, hx of gastric bypass, depression and ulcerative colitis - who presented to the ED on 10/19/23 from his Podiatry clinic for further evaluation of toe pain and cellulitis.   Current problems include:     Osteomyelitis and Cellulitis of Right great toe   Right hallux ulcer  Right foot hallux amputation today by Dr. REDD Max, Podiatry  Per admit H & P: is a diabetic with peripheral neuropathy. Wound has been present on his toe for 4-5 months and it has gotten worse in the past month. Went to his podiatrist who recommend further evaluation. Right toe xray is showing lytic destructive changes involving the tuft of the distal phalanx of the great toe compatible with osteomyelitis. CRP and ESR are both elevated. Wound is draining serosanguinous fluid.   - MRI Right Foot: 1.  Ulceration involving the great toe with associated cellulitis;  2.  Osteomyelitis of the distal phalanx great toe.  - 10/19 Blood cx: NGTD  - await follow up by Podiatry 10/22  - continue Cefepime and Vancomycin   - weight bearing per Podiatry post-op      Chronic Medical Problems:      T2DM  - holding home Jardiance and Pioglitazone   - continue LDSSI   - decrease IVF to TKO after surgery, if PO intake adequate     HTN  - hold Lisinopril (20 mg daily) 10/22 a.m. until repeat K reviewed;  - continue Lasix 20 mg BID     Hyperkalemia, mild.  - may be related to sample collection  - repeat BMP; hold lisinopril temporarily     A-fib on Eliquis  - holding home Eliquis; last dose 10/19 morning   - continue home Metoprolol      Depression  - continue home Zoloft      GERD  - continue home Protonix      Ulcerative colitis  - continue home Colazal      Gout  - continue home Allopurinol      HAKEEM  -  "continue home CPAP      Clinically Significant Risk Factors Present on Admission     Hypoalbuminemia: Lowest albumin = 3.2 g/dL at 10/19/2023 11:46 AM, will monitor as appropriate  Drug Induced Coagulation Defect: home medication list includes an anticoagulant medication       Obesity: Estimated body mass index is 34.17 kg/m  as calculated from the following:    Height as of this encounter: 1.803 m (5' 11\").    Weight as of this encounter: 111.1 kg (245 lb).              Diet: NPO earlier today, except for: Meds  - now back on Moderate Consistent Carb (60 g CHO per Meal) DietCarb consistent   DVT Prophylaxis: Pneumatic Compression Devices  Beltran Catheter: Not present  Lines: None     Cardiac Monitoring: None  Code Status: Full Code      Disposition Plan     Expected Discharge Date: TBD post-surgery.     Disposition: Expected discharge in 1-2 days.      SWAPNA Dockery MD, [facility] Hospitalist  Text Page (7am - 6pm)    Interval History   No reports of pain today.  Tolerated surgery well; No f/c/SOB.  Up walking w/ assist to bathroom after surgery.    Data reviewed today: I reviewed all new labs and imaging results over the last 24 hours.     Physical Exam   Temp: 97.6  F (36.4  C) Temp src: Oral BP: 101/66 Pulse: 67   Resp: 16 SpO2: 98 % O2 Device: None (Room air) Oxygen Delivery: 6 LPM  Vitals:    10/19/23 1142   Weight: 111.1 kg (245 lb)     Vital Signs with Ranges  Temp:  [97.3  F (36.3  C)-98.5  F (36.9  C)] 97.6  F (36.4  C)  Pulse:  [56-70] 67  Resp:  [11-20] 16  BP: (101-138)/(66-80) 101/66  SpO2:  [97 %-100 %] 98 %  I/O last 3 completed shifts:  In: 980 [P.O.:480; I.V.:500]  Out: 10 [Blood:10]    Constitutional: awake, no apparent distress; lying in bed  HEENT: sclerae clear; MM's moist  Respiratory: good a/e bilaterally, no wheezing or rhonchi  Cardiovascular: Regular rate and rhythm, S1, S2 noted; no m/r/g  GI: abdomen flat, + bowel sounds; soft, non-tender, non-distended  Skin/Integumen: " Right foot in protective dressing; no rashes, no cyanosis, no jaundice  Musculoskeletal: no edema  Neurologic: follows directions well; no focal deficits      Medications    dextrose 5% and 0.45% NaCl 75 mL/hr at 10/21/23 0112      allopurinol  200 mg Oral Daily    balsalazide  4,500 mg Oral Daily    ceFEPIme  2 g Intravenous Q12H    furosemide  20 mg Oral BID    insulin aspart  1-3 Units Subcutaneous TID AC    insulin aspart  1-3 Units Subcutaneous At Bedtime    lisinopril  20 mg Oral Daily    metoprolol succinate ER  25 mg Oral Daily    pantoprazole  40 mg Oral Daily    sertraline  150 mg Oral Daily    vancomycin  750 mg Intravenous Q12H       Data   Recent Labs   Lab 10/21/23  2202 10/21/23  1810 10/21/23  1215 10/21/23  0825 10/20/23  0801 10/20/23  0646 10/19/23  1821 10/19/23  1146   WBC  --   --   --  6.8  --   --   --  8.1   HGB  --   --   --  10.1*  --   --   --  11.0*   MCV  --   --   --  104*  --   --   --  105*   PLT  --   --   --  352  --   --   --  451*   INR  --   --   --   --   --   --   --  1.03   NA  --   --   --  137  --   --   --  136   POTASSIUM  --   --   --  5.4*  --  4.3  --  5.3   CHLORIDE  --   --   --  101  --   --   --  100   CO2  --   --   --  27  --   --   --  26   BUN  --   --   --  8.4  --   --   --  7.7*   CR  --   --   --  1.00  1.00  --  1.00  --  1.00   ANIONGAP  --   --   --  9  --   --   --  10   RENATE  --   --   --  9.0  --   --   --  8.8   * 130* 109* 125*   < >  --    < > 139*   ALBUMIN  --   --   --   --   --   --   --  3.2*   PROTTOTAL  --   --   --   --   --   --   --  7.2   BILITOTAL  --   --   --   --   --   --   --  0.3   ALKPHOS  --   --   --   --   --   --   --  118   ALT  --   --   --   --   --   --   --  14   AST  --   --   --   --   --   --   --  30    < > = values in this interval not displayed.

## 2023-10-22 NOTE — PROGRESS NOTES
Pt A&Ox4.Pt had R foot hallux amputation.Vitals stable.Pt last BS checked was 130,no insulin needed.Denies SOB,and pain. Tolerated Walks to the BR. CPAP uses while sleep.IV SL.CMS intact.Dressing CDI.Pt needs met at this time.

## 2023-10-22 NOTE — PHARMACY-VANCOMYCIN DOSING SERVICE
Pharmacy Vancomycin Note  Date of Service 2023  Patient's  1960   63 year old, male    Indication: Osteomyelitis, SSTI  Day of Therapy: 3  Current vancomycin regimen:  750 mg IV q8h  Current vancomycin monitoring method: AUC  Current vancomycin therapeutic monitoring goal: 400-600 mg*h/L    InsightRX Prediction of Current Vancomycin Regimen    Loading dose: N/A  Regimen: 750 mg IV every 8 hours.  Start time: 21:54 on 10/21/2023  Exposure target: AUC24 (range)400-600 mg/L.hr   AUC24,ss: 696 mg/L.hr  Probability of AUC24 > 400: 100 %  Ctrough,ss: 25.2 mg/L  Probability of Ctrough,ss > 20: 92 %  Probability of nephrotoxicity (Lodise NALLELY ): 28 %    Current estimated CrCl = Estimated Creatinine Clearance: 94.6 mL/min (based on SCr of 1 mg/dL).    Creatinine for last 3 days  10/19/2023: 11:46 AM Creatinine 1.00 mg/dL  10/20/2023:  6:46 AM Creatinine 1.00 mg/dL  10/21/2023:  8:25 AM Creatinine 1.00 mg/dL;  8:25 AM Creatinine 1.00 mg/dL    Recent Vancomycin Levels (past 3 days)  10/21/2023:  8:25 AM Vancomycin 32.0 ug/mL;  8:40 PM Vancomycin 20.2 ug/mL    Vancomycin IV Administrations (past 72 hours)                     vancomycin (VANCOCIN) 750 mg in sodium chloride 0.9 % 250 mL intermittent infusion (mg) 750 mg New Bag 10/21/23 0536     750 mg New Bag 10/20/23 2037     750 mg New Bag  1240     750 mg New Bag  0425    vancomycin (VANCOCIN) 2,500 mg in 0.9% NaCl 500 mL intermittent infusion (mg) 2,500 mg New Bag 10/19/23 2106                    Nephrotoxins and other renal medications (From now, onward)      Start     Dose/Rate Route Frequency Ordered Stop    10/21/23 2200  vancomycin (VANCOCIN) 750 mg in sodium chloride 0.9 % 250 mL intermittent infusion         750 mg  over 90 Minutes Intravenous EVERY 12 HOURS 10/21/23 2200      10/20/23 0900  lisinopril (ZESTRIL) tablet 20 mg        Note to Pharmacy: PTA Sig:Take 20 mg by mouth daily      20 mg Oral DAILY 10/19/23 1801      10/19/23 2100   furosemide (LASIX) tablet 20 mg        Note to Pharmacy: PTA Sig:Take 20 mg by mouth 2 times daily      20 mg Oral 2 TIMES DAILY 10/19/23 1801                 Contrast Orders - past 72 hours (72h ago, onward)      Start     Dose/Rate Route Frequency Stop    10/19/23 2000  gadobutrol (GADAVIST) injection 11 mL         11 mL Intravenous ONCE 10/19/23 2045            Interpretation of levels and current regimen:  Vancomycin level is reflective of AUC greater than 600    Has serum creatinine changed greater than 50% in last 72 hours: No    Urine output:  good urine output    Renal Function: Stable    InsightRX Prediction of Planned New Vancomycin Regimen  Loading dose: N/A  Regimen: 750 mg IV every 12 hours.  Start time: 21:54 on 10/21/2023  Exposure target: AUC24 (range)400-600 mg/L.hr   AUC24,ss: 475 mg/L.hr  Probability of AUC24 > 400: 90 %  Ctrough,ss: 15.9 mg/L  Probability of Ctrough,ss > 20: 10 %  Probability of nephrotoxicity (Lodise NALLELY 2009): 11 %      Plan:  Decrease Dose to 750mg q12h  Vancomycin monitoring method: AUC  Vancomycin therapeutic monitoring goal: 400-600 mg*h/L  Pharmacy will check vancomycin levels as appropriate in 1-3 Days.  Serum creatinine levels will be ordered daily for the first week of therapy and at least twice weekly for subsequent weeks.    Farzaneh Hernandez [facility]

## 2023-10-23 NOTE — PLAN OF CARE
Goal Outcome Evaluation:    Alert and oriented X4, VSS on RA. Independent in the room.CPAP during the night. Numbness and tingling present on LE.  R food Dressing CDI. at 2am. Denies pain. Discharge TBD

## 2023-10-23 NOTE — PROGRESS NOTES
"[place]      ASSESSMENT:  63-year-old male with type 2 diabetes, peripheral neuropathy, GERD, A-fib on Eliquis, HAKEEM, hypertension, ulcerative colitis, history of gastric bypass, depression, who presented to the emergency department on 10/19/2023, per the direction of his podiatrist, due to deterioration of a right great toe wound and concern for infection.     Osteomyelitis, distal phalanx, right hallux status post partial amputation and flap closure 10/21/23.     MEDICAL DECISION MAKING:  -Discussed all findings with patient. Chart and imaging reviewed.   -Patient post op for partial hallux amp  on 10/21: doing well. Incision site viable and without cellulitis. Nontender.  -Cultures pending   -No further podiatry plans. Okay to discharge with oral abx per culture results. Likely today or tomorrow.   -All bone infection felt to be resected.   -WBAT to RLE   -Okay to restart eliquis at full dose.   -Discussed post op plan and discharge instructions completed. Will sign off.         Jolanta Diallo DPM   [facility] Department of Podiatry/Foot & Ankle Surgery  [phone]      _______________________________________________________________________      SUBJECTIVE:  Matias is seen in follow up for right foot. Had partial hallux amputation on 10/21. States he feels well and is hoping for discharge soon.     EXAM:    Vital signs:  Temp: 97.9  F (36.6  C) Temp src: Oral BP: 134/82 Pulse: 98   Resp: 16 SpO2: 99 % O2 Device: None (Room air) Oxygen Delivery: 6 LPM Height: 180.3 cm (5' 11\") Weight: 111.1 kg (245 lb)  Estimated body mass index is 34.17 kg/m  as calculated from the following:    Height as of this encounter: 1.803 m (5' 11\").    Weight as of this encounter: 111.1 kg (245 lb).        Dermatological: the right hallux amputation site is stable  Incision coapted  Sutures intact  [place] of skin flap viable  No erythema.  Serosanguinous drainage--> resolved.     EXAM: XR FOOT PORT " "RIGHT 3 VIEWS  LOCATION: [facility]  DATE: 10/21/2023     INDICATION: post op   partial right hallux amputation  COMPARISON: None.                                                                  IMPRESSION: No fracture or acute osteomyelitis. Partial amputation of the great toe at the level of the distal portion of the proximal phalanx. Moderate degenerative changes throughout the midfoot. Hammertoe deformities. Small plantar and Achilles' heel   spurs.    Labs:     Lab Results   Component Value Date    INR 1.03 10/19/2023    INR 1.17 12/08/2009     No components found for: \"ESR\"  @BREIFLAB(crp)@    Lab Results   Component Value Date    WBC 6.8 10/21/2023    WBC 7.9 03/04/2021     Lab Results   Component Value Date    RBC 3.04 10/21/2023    RBC 3.89 03/04/2021     Lab Results   Component Value Date    HGB 9.6 10/22/2023    HGB 12.7 03/04/2021     Lab Results   Component Value Date    HCT 31.5 10/21/2023    HCT 39.3 03/04/2021     No components found for: \"MCT\"  Lab Results   Component Value Date     10/21/2023     03/04/2021     Lab Results   Component Value Date    MCH 33.2 10/21/2023    MCH 32.6 03/04/2021     Lab Results   Component Value Date    MCHC 32.1 10/21/2023    MCHC 32.3 03/04/2021     Lab Results   Component Value Date    RDW 17.9 10/21/2023    RDW 15.6 03/04/2021     Lab Results   Component Value Date     10/21/2023     03/04/2021       All cultures:  No results for input(s): \"CULT\" in the last 168 hours.    Hemoglobin   Date Value Ref Range Status   10/22/2023 9.6 (L) 13.3 - 17.7 g/dL Final   03/04/2021 12.7 (L) 13.3 - 17.7 g/dL Final       Cultures: pending     Pathology: pending      "

## 2023-10-23 NOTE — PROGRESS NOTES
10/23/23 1321   Appointment Info   Signing Clinician's Name / Credentials (PT) Cady Guerrero, MERLYN   Living Environment   People in Home alone   Current Living Arrangements house   Home Accessibility stairs to enter home;stairs within home   Number of Stairs, Main Entrance 2   Stair Railings, Main Entrance none   Number of Stairs, Within Home, Primary eight   Stair Railings, Within Home, Primary railings safe and in good condition;railing on left side (ascending)   Transportation Anticipated family or friend will provide   Self-Care   Usual Activity Tolerance good   Current Activity Tolerance moderate   Regular Exercise No   Equipment Currently Used at Home none   Fall history within last six months no   Activity/Exercise/Self-Care Comment Patient lives alone. Manages own lawnmowing and shoveling, household chores including laundry in basement (laundry shoot to throw closthes down; basket to bring up but has been difficult lately doing steps; fatigues), drives, has  gather his groceries and he picks them up.   General Information   Onset of Illness/Injury or Date of Surgery 10/19/23   Referring Physician Parker Mcmullen MD   Patient/Family Therapy Goals Statement (PT) Feels good about discharging to home today   Pertinent History of Current Problem (include personal factors and/or comorbidities that impact the POC) Admitted with osteomyelitis of R great toe; 10/21 partial R great toe amputation.   Weight-Bearing Status - RLE weight-bearing as tolerated  (last order for WBAT, post op shoe (off-loading toes), prior order for heel weight bearing; post op shoe will provide this.)   General Observations Lying in bed; agreeable to participate.   Cognition   Affect/Mental Status (Cognition) WNL   Orientation Status (Cognition) oriented x 4   Follows Commands (Cognition) WNL   Pain Assessment   Patient Currently in Pain No   Integumentary/Edema   Integumentary/Edema Comments R calf significantly bigger than  left. Patient reports it has been this way for close to 30 years because of his roller skating history. Nsg. aware and physician aware per pt. Appears muscular.   Posture    Posture Forward head position   Range of Motion (ROM)   Range of Motion ROM is WFL   ROM Comment deferred R foot   Strength (Manual Muscle Testing)   Strength (Manual Muscle Testing) strength is WFL   Bed Mobility   Comment, (Bed Mobility) I sit to and from supine   Transfers   Comment, (Transfers) I sit to and from stand   Gait/Stairs (Locomotion)   Comment, (Gait/Stairs) ambulated on level I'lly with no AD; post op shoe on R; antalgic on R   Balance   Balance Comments No LOB without AD   Sensory Examination   Sensory Perception Comments patient reports having PN   Coordination   Coordination no deficits were identified   Muscle Tone   Muscle Tone no deficits were identified   Clinical Impression   Criteria for Skilled Therapeutic Intervention Yes, treatment indicated   PT Diagnosis (PT) R partial great toe amputation   Influenced by the following impairments post op shoe for off loading toe on R, potential pain post op (not currently)   Functional limitations due to impairments increased antalgic gait; still functional   Clinical Presentation (PT Evaluation Complexity) stable   Clinical Presentation Rationale per clinical judgement   Clinical Decision Making (Complexity) low complexity   Planned Therapy Interventions (PT) gait training   Risk & Benefits of therapy have been explained evaluation/treatment results reviewed;care plan/treatment goals reviewed;risks/benefits reviewed;current/potential barriers reviewed;participants voiced agreement with care plan;participants included;patient   PT Total Evaluation Time   PT Eval, Low Complexity Minutes (90184) 10   Physical Therapy Goals   PT Frequency One time eval and treatment only   PT Predicted Duration/Target Date for Goal Attainment 10/23/23   PT: Gait Independent;Modified  independent;Straight cane;100 feet   PT: Stairs Modified independent;4 stairs;Rail on left;Assistive device   Therapeutic Activity   Therapeutic Activities: dynamic activities to improve functional performance Minutes (08826) 10   Symptoms Noted During/After Treatment None   Treatment Detail/Skilled Intervention Patient donned post op shoe on right and tennis shoe on L I'lly. Did adhere velcro incorrectly on R. Returned to sit and therapist assisted and educated in appropriate way to secure post op shoe; patient expressed understanding. Educated on not driving without physician's OK; defer to physician. Discussed how pt. has been using laundry basket to bring clean laundry up steps but having to rest it on lasha two steps. Was getting tired on stairs. Recommend using bag for clothes and to always hold rail. Has plastic chair he states he can place in shower but, per nsg., no showering for 2 weeks. Likely will be able to stand. Recommend relying on brother/sister/neighbors for driving and lawncare at this time.   Gait Training   Gait Training Minutes (60701) 10   Symptoms Noted During/After Treatment (Gait Training) none   Treatment Detail/Skilled Intervention Patient I with ambulation without AD with mildly increased lateral sway and antalgic on R. Does have SEC at home but unsure correct pattern to use. Patient educated and performed ambulation with SEC. Recommend to use if he has increased pain. Ambulated a total of 150'; 100 without AD and 50' with SEC. Safe to amb. without AD but now educated on how to use if needed. Up and down 4 steps x 3 with light hold on rail; reciprocal pattern tolerated well. Initially SBA, progressed to modified I. I.   Distance in Feet 150'   PT Discharge Planning   PT Plan discharge from PT; planned D/C from hospital   PT Discharge Recommendation (DC Rec) home   PT Rationale for DC Rec Patient is ambulating without AD I'lly; instructed in use of SEC which he has if he encounters more  pain. Up and down 4 steps x 3 with light hold on rail and felt he was doing better than prior to surgery. No barriers to returning home are apparent. Recommend pt. get clearance from physician prior to driving.   PT Brief overview of current status I with amb. without AD including stairs by end of session, I transfers   PT Equipment Needed at Discharge   (has SEC)   Total Session Time   Timed Code Treatment Minutes 20   Total Session Time (sum of timed and untimed services) 30

## 2023-10-23 NOTE — DISCHARGE SUMMARY
"Discharge Summary    Fer Myles MRN# [id number]   YOB: 1960 Age: 63 year old     Date of Admission:  10/19/2023  Date of Discharge:  10/23/2023  Admitting Physician:  Shahla Goodrich MD  Discharge Physician:  Parker Mcmullen MD Pager [phone] Office [phone]  Discharging Service:  [facility]     Primary Provider: Gonzalez Mancuso          Discharge Diagnosis:     See problem-oriented hospital course for diagnoses addressed during this hospital stay.             Discharge Disposition:     Discharged to home          Condition on Discharge:     Discharge condition: Stable   Discharge vitals: Blood pressure 134/82, pulse 98, temperature 97.9  F (36.6  C), temperature source Oral, resp. rate 16, height 1.803 m (5' 11\"), weight 111.1 kg (245 lb), SpO2 99%.   Code status on discharge: Full Code          Discharge Medications:        Review of your medicines        START taking        Dose / Directions   cefdinir 300 MG capsule  Commonly known as: OMNICEF  Indication: Infection of the Skin and/or Soft Tissue      Dose: 300 mg  Take 1 capsule (300 mg) by mouth every 12 hours for 7 days  Quantity: 14 capsule  Refills: 0     doxycycline hyclate 100 MG capsule  Commonly known as: VIBRAMYCIN  Indication: Infection of the Skin and/or Soft Tissue      Dose: 100 mg  Take 1 capsule (100 mg) by mouth every 12 hours for 7 days  Quantity: 14 capsule  Refills: 0            CONTINUE these medicines which have NOT CHANGED        Dose / Directions   allopurinol 100 MG tablet  Commonly known as: ZYLOPRIM      Dose: 200 mg  Take 200 mg by mouth daily  Refills: 0     apixaban ANTICOAGULANT 5 MG tablet  Commonly known as: ELIQUIS  Used for: Paroxysmal atrial fibrillation (H)      Dose: 5 mg  Take 1 tablet (5 mg) by mouth 2 times daily  Quantity: 180 tablet  Refills: 0     balsalazide 750 MG capsule  Commonly known as: COLAZAL      Dose: 4,500 mg  Take 4,500 mg by mouth daily  Refills: 0   "   furosemide 20 MG tablet  Commonly known as: LASIX      Dose: 20 mg  Take 20 mg by mouth 2 times daily  Refills: 0     Jardiance 25 MG Tabs tablet  Generic drug: empagliflozin      Dose: 25 mg  Take 25 mg by mouth daily  Refills: 0     lisinopril 20 MG tablet  Commonly known as: ZESTRIL      Dose: 20 mg  Take 20 mg by mouth daily  Refills: 0     metoprolol succinate ER 25 MG 24 hr tablet  Commonly known as: TOPROL XL      Dose: 25 mg  Take 25 mg by mouth daily  Refills: 0     pantoprazole 40 MG EC tablet  Commonly known as: PROTONIX      Dose: 40 mg  Take 40 mg by mouth daily  Refills: 0     pioglitazone 30 MG tablet  Commonly known as: ACTOS      Dose: 30 mg  Take 30 mg by mouth daily  Refills: 0     potassium chloride haja er  Commonly known as: K-DUR      Dose: 10 mEq  Take 10 mEq by mouth daily  Refills: 0     sertraline 100 MG tablet  Commonly known as: ZOLOFT      Dose: 150 mg  Take 150 mg by mouth daily  Refills: 0               Where to get your medicines        These medications were sent to [facility] [address] [address]  [address] [place] [id number]      Phone: [phone]   cefdinir 300 MG capsule  doxycycline hyclate 100 MG capsule           Consultations:     Podiatry.          Brief Hx Hospital Course:     Fer Myles is a pleasant 63 year old gentleman with history of GERD, A-fib on Eliquis, DM type II, HAKEEM on CPAP, HTN, hx of gastric bypass, depression and ulcerative colitis - who presented to the ED on 10/19/23 from his Podiatry clinic for further evaluation of toe pain and cellulitis.   Current problems include:     Osteomyelitis and Cellulitis of Right great toe   Right hallux ulcer  Right foot hallux amputation today by Dr. REDD Max, Podiatry  Per admit H & P: is a diabetic with peripheral neuropathy. Wound has been present on his toe for 4-5 months and it has gotten worse in the past month. Went to his podiatrist who recommend further  evaluation. Right toe xray is showing lytic destructive changes involving the tuft of the distal phalanx of the great toe compatible with osteomyelitis. CRP and ESR are both elevated. Wound is draining serosanguinous fluid.   MRI Right Foot: 1.  Ulceration involving the great toe with associated cellulitis;  2.  Osteomyelitis of the distal phalanx great toe.  Blood cx: NGTD.  Has been on Cefepime and Vancomycin  ID consult regarding abx choice.  - Cefdinir/doxy x 7 days.  - F/Up with Podiatry/pcp.        T2DM  - Resume PTA regimen.  - F/U with PCP.     HTN  - Resume PTA meds.     Hyperkalemia, mild. Resolved.5.4--> 3.8-->3.8 this am.  Likely be related to sample collection       A-fib on Eliquis  - Resumed Eliquis.  - continue home Metoprolol      Depression  - continue home Zoloft      GERD  - continue home Protonix      Ulcerative colitis  - continue home Colazal      Gout  - continue home Allopurinol      HAKEEM  - continue home CPAP     Cleared by PT/OT.    Patient is being discharged home in stable conditions.       DISCHARGE EXAM:   Temp:  [97.9  F (36.6  C)] 97.9  F (36.6  C)  Pulse:  [57-98] 98  Resp:  [16] 16  BP: (122-134)/(76-82) 134/82  SpO2:  [97 %-99 %] 99 %  Wt Readings from Last 5 Encounters:   10/19/23 111.1 kg (245 lb)   01/03/23 123.8 kg (273 lb)   10/31/22 122.5 kg (270 lb)   02/18/22 130 kg (286 lb 9.6 oz)   12/29/21 128.4 kg (283 lb)            Pertinent Tests and Procedures:     Per chart.               Discharge Instructions and Follow-Up:     Discharge diet: Orders Placed This Encounter      Moderate Consistent Carb (60 g CHO per Meal) Diet      Diet     Discharge activity: Activity as tolerated   Discharge follow-up: Follow up with primary care provider, podiatry.   Outpatient therapy: None    Home Care agency: None    Supplies and equipment: None   Lines and drains: None    Wound care: None   Other instructions: None      More than 30 minutes were required for coordination of care for this  discharge.         Procedures / Labs / Imaging:     Results for orders placed or performed during the hospital encounter of 10/19/23   XR Toe Right G/E 2 Views     Status: None    Narrative    EXAM: XR TOE RIGHT G/E 2 VIEWS  LOCATION: [facility]  DATE: 10/19/2023    INDICATION: Concern for cellulitis osteomyelitis of great toe.  Wound  COMPARISON: None.      Impression    IMPRESSION: Lytic destructive changes involving the tuft of the distal phalanx of the great toe compatible with osteomyelitis. Moderate hallux valgus. Multiple hammertoe deformities. Moderate degenerative changes throughout the midfoot.   MR Foot Right w/o & w Contrast     Status: None    Narrative    EXAM: MR FOOT RIGHT W/O and W CONTRAST  LOCATION: [facility]  DATE: 10/19/2023    INDICATION: right big toe wound, Xray showing lytic destructive changes involving the tuft of the distal phalanx of the great toe compatible with osteomyelitis., patient is a diabetic  COMPARISON: X-rays 10/19/2023  TECHNIQUE: Routine. Additional postgadolinium T1 sequences were obtained.  IV CONTRAST: 11mL Gadavist    FINDINGS:   Ulceration involving the distal end of the great toe with narrowing enhancing tissue or sinus tract extending to the distal phalanx, with surrounding cellulitis. There is marrow edema, enhancement, and abnormal T1 marrow signal involving the distal   phalanx with destruction of the distal tuft compatible with osteomyelitis. Minimal synovitis of the IP joint great toe. No fluid collection to suggest an abscess.    Degenerative arthritis of the MCP joint with associated focal sites of marrow edema involving the base of the proximal phalanx, first metatarsal head and adjacent hallux sesamoids. Degenerative arthritis midfoot. Marrow edema involving the distal phalanx   third toe and proximal phalanx fifth toe probably related to contusions in the absence of an associated wound. There may be a  trabecular fracture of the shaft of the proximal phalanx fifth toe.    Generalized muscle atrophy.      Impression    IMPRESSION:  1.  Ulceration involving the great toe with associated cellulitis.  2.  Osteomyelitis of the distal phalanx great toe.   XR Foot Port Right 3 Views     Status: None    Narrative    EXAM: XR FOOT PORT RIGHT 3 VIEWS  LOCATION: [facility]  DATE: 10/21/2023    INDICATION: post op   partial right hallux amputation  COMPARISON: None.      Impression    IMPRESSION: No fracture or acute osteomyelitis. Partial amputation of the great toe at the level of the distal portion of the proximal phalanx. Moderate degenerative changes throughout the midfoot. Hammertoe deformities. Small plantar and Achilles' heel   spurs.   [place] Draw     Status: None    Narrative    The following orders were created for panel order [place] Draw.  Procedure                               Abnormality         Status                     ---------                               -----------         ------                     Extra Blood Culture Bottle[[id number]]                       Final result               Extra Blue Top Tube[[id number]]                              Final result               Extra Red Top Tube[[id number]]                               Final result                 Please view results for these tests on the individual orders.   Comprehensive metabolic panel     Status: Abnormal   Result Value Ref Range    Sodium 136 135 - 145 mmol/L    Potassium 5.3 3.4 - 5.3 mmol/L    Carbon Dioxide (CO2) 26 22 - 29 mmol/L    Anion Gap 10 7 - 15 mmol/L    Urea Nitrogen 7.7 (L) 8.0 - 23.0 mg/dL    Creatinine 1.00 0.67 - 1.17 mg/dL    GFR Estimate 84 >60 mL/min/1.73m2    Calcium 8.8 8.8 - 10.2 mg/dL    Chloride 100 98 - 107 mmol/L    Glucose 139 (H) 70 - 99 mg/dL    Alkaline Phosphatase 118 40 - 129 U/L    AST 30 0 - 45 U/L    ALT 14 0 - 70 U/L    Protein Total 7.2 6.4 - 8.3 g/dL    Albumin 3.2 (L)  3.5 - 5.2 g/dL    Bilirubin Total 0.3 <=1.2 mg/dL   Extra Blood Culture Bottle     Status: None   Result Value Ref Range    Hold Specimen JIC    Extra Blue Top Tube     Status: None   Result Value Ref Range    Hold Specimen JIC    Extra Red Top Tube     Status: None   Result Value Ref Range    Hold Specimen JIC    CBC with platelets and differential     Status: Abnormal   Result Value Ref Range    WBC Count 8.1 4.0 - 11.0 10e3/uL    RBC Count 3.30 (L) 4.40 - 5.90 10e6/uL    Hemoglobin 11.0 (L) 13.3 - 17.7 g/dL    Hematocrit 34.6 (L) 40.0 - 53.0 %     (H) 78 - 100 fL    MCH 33.3 (H) 26.5 - 33.0 pg    MCHC 31.8 31.5 - 36.5 g/dL    RDW 18.1 (H) 10.0 - 15.0 %    Platelet Count 451 (H) 150 - 450 10e3/uL    % Neutrophils 68 %    % Lymphocytes 19 %    % Monocytes 9 %    Mids % (Monos, Eos, Basos)      % Eosinophils 3 %    % Basophils 1 %    % Immature Granulocytes 0 %    NRBCs per 100 WBC 0 <1 /100    Absolute Neutrophils 5.5 1.6 - 8.3 10e3/uL    Absolute Lymphocytes 1.5 0.8 - 5.3 10e3/uL    Absolute Monocytes 0.8 0.0 - 1.3 10e3/uL    Mids Abs (Monos, Eos, Basos)      Absolute Eosinophils 0.2 0.0 - 0.7 10e3/uL    Absolute Basophils 0.1 0.0 - 0.2 10e3/uL    Absolute Immature Granulocytes 0.0 <=0.4 10e3/uL    Absolute NRBCs 0.0 10e3/uL   INR     Status: Normal   Result Value Ref Range    INR 1.03 0.85 - 1.15   CRP inflammation     Status: Abnormal   Result Value Ref Range    CRP Inflammation 28.46 (H) <5.00 mg/L   Erythrocyte sedimentation rate auto     Status: Abnormal   Result Value Ref Range    Erythrocyte Sedimentation Rate 38 (H) 0 - 20 mm/hr   Glucose by meter     Status: Abnormal   Result Value Ref Range    GLUCOSE BY METER POCT 102 (H) 70 - 99 mg/dL   Glucose by meter     Status: Abnormal   Result Value Ref Range    GLUCOSE BY METER POCT 147 (H) 70 - 99 mg/dL   Potassium     Status: Normal   Result Value Ref Range    Potassium 4.3 3.4 - 5.3 mmol/L   Glucose by meter     Status: Abnormal   Result Value Ref  Range    GLUCOSE BY METER POCT 109 (H) 70 - 99 mg/dL   Glucose by meter     Status: Abnormal   Result Value Ref Range    GLUCOSE BY METER POCT 105 (H) 70 - 99 mg/dL   Creatinine     Status: Normal   Result Value Ref Range    Creatinine 1.00 0.67 - 1.17 mg/dL    GFR Estimate 84 >60 mL/min/1.73m2   Glucose by meter     Status: Abnormal   Result Value Ref Range    GLUCOSE BY METER POCT 118 (H) 70 - 99 mg/dL   Glucose by meter     Status: Abnormal   Result Value Ref Range    GLUCOSE BY METER POCT 117 (H) 70 - 99 mg/dL   Glucose by meter     Status: Abnormal   Result Value Ref Range    GLUCOSE BY METER POCT 113 (H) 70 - 99 mg/dL   Glucose by meter     Status: Abnormal   Result Value Ref Range    GLUCOSE BY METER POCT 120 (H) 70 - 99 mg/dL   Basic metabolic panel     Status: Abnormal   Result Value Ref Range    Sodium 137 135 - 145 mmol/L    Potassium 5.4 (H) 3.4 - 5.3 mmol/L    Chloride 101 98 - 107 mmol/L    Carbon Dioxide (CO2) 27 22 - 29 mmol/L    Anion Gap 9 7 - 15 mmol/L    Urea Nitrogen 8.4 8.0 - 23.0 mg/dL    Creatinine 1.00 0.67 - 1.17 mg/dL    GFR Estimate 84 >60 mL/min/1.73m2    Calcium 9.0 8.8 - 10.2 mg/dL    Glucose 125 (H) 70 - 99 mg/dL   CBC with platelets     Status: Abnormal   Result Value Ref Range    WBC Count 6.8 4.0 - 11.0 10e3/uL    RBC Count 3.04 (L) 4.40 - 5.90 10e6/uL    Hemoglobin 10.1 (L) 13.3 - 17.7 g/dL    Hematocrit 31.5 (L) 40.0 - 53.0 %     (H) 78 - 100 fL    MCH 33.2 (H) 26.5 - 33.0 pg    MCHC 32.1 31.5 - 36.5 g/dL    RDW 17.9 (H) 10.0 - 15.0 %    Platelet Count 352 150 - 450 10e3/uL   Vancomycin level     Status: Abnormal   Result Value Ref Range    Vancomycin 32.0 (HH)   ug/mL   Creatinine     Status: Normal   Result Value Ref Range    Creatinine 1.00 0.67 - 1.17 mg/dL    GFR Estimate 84 >60 mL/min/1.73m2   Glucose by meter     Status: Abnormal   Result Value Ref Range    GLUCOSE BY METER POCT 140 (H) 70 - 99 mg/dL   Glucose by meter     Status: Abnormal   Result Value Ref Range     GLUCOSE BY METER POCT 109 (H) 70 - 99 mg/dL   Vancomycin level     Status: Normal   Result Value Ref Range    Vancomycin 20.2   ug/mL   Glucose by meter     Status: Abnormal   Result Value Ref Range    GLUCOSE BY METER POCT 130 (H) 70 - 99 mg/dL   Glucose by meter     Status: Abnormal   Result Value Ref Range    GLUCOSE BY METER POCT 127 (H) 70 - 99 mg/dL   Iron and iron binding capacity     Status: Abnormal   Result Value Ref Range    Iron 25 (L) 61 - 157 ug/dL    Iron Binding Capacity 220 (L) 240 - 430 ug/dL    Iron Sat Index 11 (L) 15 - 46 %   Ferritin     Status: Normal   Result Value Ref Range    Ferritin 37 31 - 409 ng/mL   Hemoglobin     Status: Abnormal   Result Value Ref Range    Hemoglobin 9.6 (L) 13.3 - 17.7 g/dL   Basic metabolic panel     Status: Abnormal   Result Value Ref Range    Sodium 137 135 - 145 mmol/L    Potassium 3.8 3.4 - 5.3 mmol/L    Chloride 103 98 - 107 mmol/L    Carbon Dioxide (CO2) 24 22 - 29 mmol/L    Anion Gap 10 7 - 15 mmol/L    Urea Nitrogen 9.2 8.0 - 23.0 mg/dL    Creatinine 0.97 0.67 - 1.17 mg/dL    GFR Estimate 87 >60 mL/min/1.73m2    Calcium 8.6 (L) 8.8 - 10.2 mg/dL    Glucose 127 (H) 70 - 99 mg/dL   Glucose by meter     Status: Abnormal   Result Value Ref Range    GLUCOSE BY METER POCT 127 (H) 70 - 99 mg/dL   Glucose by meter     Status: Abnormal   Result Value Ref Range    GLUCOSE BY METER POCT 114 (H) 70 - 99 mg/dL   Glucose by meter     Status: Abnormal   Result Value Ref Range    GLUCOSE BY METER POCT 100 (H) 70 - 99 mg/dL   Glucose by meter     Status: Abnormal   Result Value Ref Range    GLUCOSE BY METER POCT 105 (H) 70 - 99 mg/dL   Glucose by meter     Status: Abnormal   Result Value Ref Range    GLUCOSE BY METER POCT 101 (H) 70 - 99 mg/dL   Basic metabolic panel     Status: Abnormal   Result Value Ref Range    Sodium 136 135 - 145 mmol/L    Potassium 3.8 3.4 - 5.3 mmol/L    Chloride 102 98 - 107 mmol/L    Carbon Dioxide (CO2) 25 22 - 29 mmol/L    Anion Gap 9 7 - 15  mmol/L    Urea Nitrogen 6.7 (L) 8.0 - 23.0 mg/dL    Creatinine 0.93 0.67 - 1.17 mg/dL    GFR Estimate >90 >60 mL/min/1.73m2    Calcium 8.8 8.8 - 10.2 mg/dL    Glucose 116 (H) 70 - 99 mg/dL   Glucose by meter     Status: Abnormal   Result Value Ref Range    GLUCOSE BY METER POCT 125 (H) 70 - 99 mg/dL   Glucose by meter     Status: Abnormal   Result Value Ref Range    GLUCOSE BY METER POCT 106 (H) 70 - 99 mg/dL   Hemoglobin A1c     Status: Normal   Result Value Ref Range    Hemoglobin A1C 5.6 <5.7 %   Glucose by meter     Status: Normal   Result Value Ref Range    GLUCOSE BY METER POCT 97 70 - 99 mg/dL   Blood Culture Peripheral Blood     Status: Normal (Preliminary result)    Specimen: Peripheral Blood   Result Value Ref Range    Culture No growth after 3 days    Blood Culture Peripheral Blood     Status: Normal (Preliminary result)    Specimen: Peripheral Blood   Result Value Ref Range    Culture No growth after 3 days    Anaerobic Bacterial Culture Routine     Status: None (Preliminary result)    Specimen: Toe, Right; Tissue   Result Value Ref Range    Culture No anaerobic organisms isolated after 1 day    Tissue Aerobic Bacterial Culture Routine     Status: None (Preliminary result)    Specimen: Toe, Right; Tissue   Result Value Ref Range    Culture Culture in progress    CBC with platelets + differential     Status: Abnormal    Narrative    The following orders were created for panel order CBC with platelets + differential.  Procedure                               Abnormality         Status                     ---------                               -----------         ------                     CBC with platelets and d...[[id number]]  Abnormal            Final result                 Please view results for these tests on the individual orders.

## 2023-10-23 NOTE — PROGRESS NOTES
A&Ox4.Vitals stable.Denies pain and SOB.Pt has diarrhea this morning provider aware of it.Dressing changed by surgeon.Pt tolerated moderate carb diet.IV antibiotic intermittent.The end of the shift pt notified he had another episode of diarrhea passed to in coming nurse.Pt needs completed at this time.

## 2023-10-23 NOTE — PLAN OF CARE
OT: orders received and chart reviewed and discussed with PT. Pt is doing well with mobility following partial toe ambulation. Pt unable to shower for a couple weeks, but understand tub transfer when he is able to. Pt reports friends and family help with IADLs as needed until back to baseline. No acute OT needs at this time.
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
## 2023-10-23 NOTE — PLAN OF CARE
Physical Therapy Discharge Summary    Reason for therapy discharge:    Discharged to home.  All goals and outcomes met, no further needs identified.    Progress towards therapy goal(s). See goals on Care Plan in [facility] electronic health record for goal details.  Goals met    Therapy recommendation(s):    No further therapy is recommended.

## 2023-10-23 NOTE — PLAN OF CARE
Goal Outcome Evaluation:    Patient discharging to home.  Orders received.  Prescriptions sent to patient's pharmacy, pt to  when ready.  Will continue to monitor.

## 2023-10-23 NOTE — PROGRESS NOTES
[facility]    Hospitalist Progress Note    Date of Service (when I saw the patient): 10/23/2023    Assessment & Plan   Fer Myles is a pleasant 63 year old gentleman with history of GERD, A-fib on Eliquis, DM type II, HAKEEM on CPAP, HTN, hx of gastric bypass, depression and ulcerative colitis - who presented to the ED on 10/19/23 from his Podiatry clinic for further evaluation of toe pain and cellulitis.   Current problems include:     Osteomyelitis and Cellulitis of Right great toe   Right hallux ulcer  Right foot hallux amputation today by Dr. REDD Max, Podiatry  Per admit H & P: is a diabetic with peripheral neuropathy. Wound has been present on his toe for 4-5 months and it has gotten worse in the past month. Went to his podiatrist who recommend further evaluation. Right toe xray is showing lytic destructive changes involving the tuft of the distal phalanx of the great toe compatible with osteomyelitis. CRP and ESR are both elevated. Wound is draining serosanguinous fluid.   MRI Right Foot: 1.  Ulceration involving the great toe with associated cellulitis;  2.  Osteomyelitis of the distal phalanx great toe.  Blood cx: NGTD  - Follow tissue cultures.  - ID consult regarding abx.  - continue Cefepime and Vancomycin pening ID input.  - Further plan per pidiatry.        T2DM  - holding home Jardiance and Pioglitazone   - continue LDSSI   - decrease IVF to TKO after surgery, if PO intake adequate     HTN  - Resume lisinopril at a lower dose (increase to home dose as needed).  - continue Lasix 20 mg BID     Hyperkalemia, mild. Resolved. 3.8 this am.  - may be related to sample collection  - Monitor levels.     A-fib on Eliquis  - holding home Eliquis; last dose 10/19 morning. Resume today if ok with podiatry.  - continue home Metoprolol      Depression  - continue home Zoloft      GERD  - continue home Protonix      Ulcerative colitis  - continue home Colazal      Gout  - continue home  Allopurinol      HAKEEM  - continue home CPAP        Diet: Consistent Carb (60 g CHO per Meal) DietCarb consistent   DVT Prophylaxis: Pneumatic Compression Devices.  Resume eliquis when ok with podiatry.  Beltran Catheter: Not present  Lines: None     Cardiac Monitoring: None  Code Status: Full Code      Disposition Plan     Expected Discharge Date: TBD post-surgery.     Disposition: pending ID input, therapies and podiatry clearance.      Parker Mcmullen MD, [facility]     [facility]      Interval History   Pain is well controlled. Denies chest pain and sob. Oriented x3.    Data reviewed today: I reviewed all new labs and imaging results over the last 24 hours.     Physical Exam   Temp: 97.9  F (36.6  C) Temp src: Oral BP: 134/82 Pulse: 98   Resp: 16 SpO2: 99 % O2 Device: None (Room air)    Vitals:    10/19/23 1142   Weight: 111.1 kg (245 lb)     Vital Signs with Ranges  Temp:  [97.9  F (36.6  C)] 97.9  F (36.6  C)  Pulse:  [57-98] 98  Resp:  [16] 16  BP: (106-134)/(68-82) 134/82  SpO2:  [97 %-99 %] 99 %  No intake/output data recorded.    Constitutional: awake, no apparent distress; lying in bed  HEENT: sclerae clear; MM's moist  Respiratory: good a/e bilaterally, no wheezing or rhonchi  Cardiovascular: Regular rate and rhythm, S1, S2 noted; no m/r/g  GI: abdomen flat, + bowel sounds; soft, non-tender, non-distended  Skin/Integumen: Right foot in protective dressing; no rashes, no cyanosis, no jaundice  Musculoskeletal: no edema  Neurologic: follows directions well; no focal deficits      Medications        allopurinol  200 mg Oral Daily    balsalazide  4,500 mg Oral Daily    ceFEPIme  2 g Intravenous Q12H    furosemide  20 mg Oral BID    insulin aspart  1-3 Units Subcutaneous TID AC    insulin aspart  1-3 Units Subcutaneous At Bedtime    lisinopril  10 mg Oral Daily    metoprolol succinate ER  25 mg Oral Daily    pantoprazole  40 mg Oral Daily    sertraline  150 mg Oral Daily    vancomycin  750 mg Intravenous  2       Data   Recent Labs   Lab 10/23/23  0833 10/23/23  0819 10/23/23  0237 10/22/23  0755 10/22/23  0712 10/21/23  1215 10/21/23  0825 10/19/23  1821 10/19/23  1146   WBC  --   --   --   --   --   --  6.8  --  8.1   HGB  --   --   --   --  9.6*  --  10.1*  --  11.0*   MCV  --   --   --   --   --   --  104*  --  105*   PLT  --   --   --   --   --   --  352  --  451*   INR  --   --   --   --   --   --   --   --  1.03   NA  --  136  --   --  137  --  137  --  136   POTASSIUM  --  3.8  --   --  3.8  --  5.4*   < > 5.3   CHLORIDE  --  102  --   --  103  --  101  --  100   CO2  --  25  --   --  24  --  27  --  26   BUN  --  6.7*  --   --  9.2  --  8.4  --  7.7*   CR  --  0.93  --   --  0.97  --  1.00  1.00   < > 1.00   ANIONGAP  --  9  --   --  10  --  9  --  10   RENATE  --  8.8  --   --  8.6*  --  9.0  --  8.8   * 116* 125*   < > 127*   < > 125*   < > 139*   ALBUMIN  --   --   --   --   --   --   --   --  3.2*   PROTTOTAL  --   --   --   --   --   --   --   --  7.2   BILITOTAL  --   --   --   --   --   --   --   --  0.3   ALKPHOS  --   --   --   --   --   --   --   --  118   ALT  --   --   --   --   --   --   --   --  14   AST  --   --   --   --   --   --   --   --  30    < > = values in this interval not displayed.

## 2023-10-24 NOTE — LETTER
[facility]  [address]  [place] [address]   [phone]         October 27, 2023    Fer Myles  [address] [place] [address] [phone]          Dear Fer,    I am a clinic care coordinator who works with Gonzalez Mancuso MD at [facility]. I recently tried to call and was unable to reach you. Below is a description of clinic care coordination and how I can further assist you.     The goal of clinic care coordination is to help you manage your health and improve access to the health care system. Our we work alongside your provider to assist you in determining your health and social needs. We can help you obtain health care and community resources, providing you with necessary information and education. We can work with you through barriers and develop a care plan that helps coordinate and strengthen the communication between you and your care team.  Our services are voluntary and are offered without charge to you personally.    Please feel free to contact me at [phone], with any questions or concerns. We at [facility] are focused on providing you with the highest-quality healthcare experience possible and that all starts with you.      Sincerely,     RHETT HannonSW

## 2023-10-27 NOTE — PROGRESS NOTES
Clinic Care Coordination Contact  [facility]/Voicemail    Clinical Data:  Pt was hospitalized at [facility] from 10/19-10/23 with osteomyelitis of toe. He was discharged home with oral abx.  Care Coordinator Outreach    Outreach Documentation Number of Outreach Attempt   10/24/2023   1:37 PM 1   10/27/2023  10:58 AM 2       Left message on patient's voicemail with call back information and requested return call.    Plan: Care Coordinator will send unable to contact letter with care coordinator contact information via [email or web address]. Care Coordinator will do no further outreaches at this time.    Sarah Allen [facility]  Social Work Care Coordinator  Phone: [phone]

## 2023-10-27 NOTE — TELEPHONE ENCOUNTER
Pt stated he decided to change his POST OP dressing, as it was falling off.     Pt noticed a WHITE PIMPLE near the POST OP suture.    Pt stated:    No pain, due to Neuropathy, pt stated    White pimple near suture    Pt states no red, no fever    POST OP appt made and is on the WAIT LIST.     Pt is planning to send a picture of this concern to this team, by [facility].    Pt's CALL transferred to Triage Nurse line    Please CALL pt, as soon as possible, to discuss his concern.    Are you able to fit this pt in, as soon as possible? Thank you.

## 2023-10-27 NOTE — TELEPHONE ENCOUNTER
Patient had the following surgery on 10/21/23 by Dr. Max.     PROCEDURE(S):  1) partial amputation of the right hallux at the level just behind the interphalangeal joint  2) complex closure with skin flap creation    Attempted to reach patient and had to leave a message. Asked that patient call back as soon as possible.  number was provided: [phone].     2nd message left at 4:45pm. Asked that patient call back on Monday. If the need was more urgent, asked that he call back and ask to speak to a nurse at : [phone].     Patient needs to move his post op appointment up for 1-2 weeks after surgery. It is scheduled too far out on 11/15/23.     VERONICA Rizvi RN

## 2023-10-27 NOTE — TELEPHONE ENCOUNTER
Did dressing change, cleaned area, noticed sutures on toe has a white pimple area on incision line.  R Great toe partial amputation 6 days ago. Afebrile, no drainage from incision, No reddness at site. No pain but patient has peripheral neuropathy so does not feel pain.  is sending message to care team but wanted triage done. Patient is also going to send picture via MY CHART later.     Protocol reviewed, Disposition: See in office within 3 days. Patient asked to call podiatrist office on Monday with update. Call back if any reddness, drainage, fever, pain develops or further questions/concerns.     SALOMON BURTON RN  [facility] nurse advisors  10/27/2023  4:06 PM  Reason for Disposition   Pimple where a stitch comes through the skin    Additional Information   Negative: Major abdominal surgical incision and wound gaping open with visible internal organs   Negative: Sounds like a life-threatening emergency to the triager   Negative: Bleeding from incision and won't stop after 10 minutes of direct pressure   Negative: Bleeding (more than a few drops) from incision and after tracheostomy or blood vessel surgery (e.g., carotid endarterectomy, femoral bypass graft, kidney dialysis fistula)   Negative: Bright red, wide-spread, sunburn-like rash   Negative: SEVERE pain in the incision   Negative: Incision gaping open and < 2 days (48 hours) since wound re-opened   Negative: Incision gaping open and length of opening > 2 inches (5 cm)   Negative: Patient sounds very sick or weak to the triager   Negative: Sounds like a serious complication to the triager   Negative: Fever > 100.4 F (38.0 C)   Negative: Incision looks infected (spreading redness, pain)   Negative: Red streak runs from the incision and longer than 1 inch (2.5 cm)   Negative: Pus or bad-smelling fluid draining from incision   Negative: Dressing soaked with blood or body fluid (e.g., drainage)   Negative: Raised bruise and size > 2 inches (5  cm) and expanding   Negative: Scant bleeding (e.g., few drops) from incision and after tracheostomy or blood vessel surgery (e.g., carotid endarterectomy, femoral bypass graft, kidney dialysis fistula   Negative: Caller has URGENT question and triager unable to answer question   Negative: Incision on the face gaping open and length of opening > 1/4 inch (6 mm), and over 2 days since wound re-opened   Negative: Incision gaping open and length of opening > 1/2 inch (1 cm) and over 2 days since wound re-opened   Negative: Clear or blood-tinged fluid draining from wound and no fever   Negative: Suture or staple removal is overdue   Negative: Patient wants to be seen   Negative: INCREASING pain in incision and over 2 days (48 hours) since surgery   Negative: Suture or staple came out early and caller wants wound checked   Negative: Caller has NON-URGENT question and triager unable to answer question    Protocols used: Post-Op Incision Symptoms and [id number]

## 2023-10-28 NOTE — TELEPHONE ENCOUNTER
Patient returning call, and he was informed that he needs to call the clinic on Monday morning. He states that he will follow through with the call. No triage.     Ana M Reyes RN  [facility]  October 27, 2023, 7:22 PM

## 2023-10-30 NOTE — TELEPHONE ENCOUNTER
[facility] Call Center    Phone Message    May a detailed message be left on voicemail: yes     Reason for Call: Other: Patient is wondering if he needs to be seen sooner than 11/15.  He found a pimple like bump coming through his sutures.  Please call back to advise.  Thank you.     Action Taken: Other: [id number]    Travel Screening: Not Applicable

## 2023-10-30 NOTE — TELEPHONE ENCOUNTER
Please also see duplicate encounter dated 10/27/23. Message was doned and did not get routed back to Podiatry pool.     Left voicemail informing patient that he needs to be scheduled to be seen this week sooner. Dr. Max has limited availability, so patient will need to be scheduled by a nurse. Asked that he call the  number; [phone] and ask to speak to a nurse to schedule.   Will also need to get more information about his incision.       Discharge instructions state the following:  WB to heel of LLE in surgical shoe. Elevate while at rest. Place ice behind knee for 15 minutes at a time.  Leave dressing to foot at all times. Do not get wet in the shower.  No dressing changes needed until follow up. If dressing gets wet or has excessive drainage, please remove  dressing. Cleanse site with saline. Pad dry and recover. Contact the office if you have any concerns.    Need to inquire if he has been removing the dressing.      VERONICA Rizvi RN

## 2023-10-31 NOTE — TELEPHONE ENCOUNTER
Patient returned our call.   He states the bandage became loose and was falling off. He then noticed an area that looked like a small heard pimple on his incision. The rest of the incision looked good without redness. He has neuropathy so had not noticed any pain. He had sterile gauze at home and was able to re-dress his foot.   Recommended an earlier appointment since his surgery was 10/21/23.   Appointment scheduled for 11/2/23 at the [place] location at 2:45 pm with 2:30 pm arrival.  Address was provided.   He verbalized understanding.     VERONICA Rizvi RN

## 2023-10-31 NOTE — TELEPHONE ENCOUNTER
Left 2nd message asking for a return call.  number was provided:[phone].     VERONICA Rizvi RN

## 2023-11-02 NOTE — PATIENT INSTRUCTIONS
Thank you for choosing [facility] / [facility]le [facility]!    DR. EID'S CLINIC LOCATIONS:     [facility] TRIAGE LINE: [phone]   [address] APPOINTMENTS: [phone]   [place] [address] RADIOLOGY: [phone]   (Every other Tues - Wed - Fri PM) SET UP SURGERY: [phone]    PHYSICAL THERAPY: [phone]   [place] SPECIALTY BILLING QUESTIONS: [phone] [id number] FAX: [phone]   [place] [address]    (Thurs & Fri AM)         Wound Care Recommendations:    1)  Keep the wound covered by a bandage when bathing.    2)  Gently clean the wound with soap water, separate from bath/shower water.      3)  Each day, apply a topical antibiotic ointment to the wound (Neosporin, Triple antibiotic, Bacitracin).   Cover with large band-aid or gauze.      5)  Please seek immediate medical attention if any increasing redness, drainage, smell, or pain related to the wound.     6)  Please return to clinic in the period of time requested by Dr. Eid.
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
## 2023-12-06 NOTE — TELEPHONE ENCOUNTER
Consult received via Workqueue from Petros Max DPM in [facility]  for wound of the toe.     Please schedule with Jolanta Diallo D.P.M., Eli Wylie PA-C, Rosalina Estrada NP, or Santi Peterson M.D. at [facility] [place] for next available appointment.    **For all providers, Sarah Ren PA-C, Dr. Diallo, Rosalina Estrada NP or Dr. Shanks, please schedule a follow up 2-3 weeks after initial appointment.**  --If unable to schedule within 2-3 weeks then please place on cancellation list--    Is the patient able to make their own medical decisions? Yes    Can the patient be scheduled on a Thursday? Yes    Is patient a CAIN lift? PLEASE INQUIRE WHEN MAKING THE APPOINTMENT AND PUT IN APPOINTMENT NOTES    Routing to  Wound Healing Scheduling.

## 2023-12-12 NOTE — TELEPHONE ENCOUNTER
Made second and final attempt to reach the patient to offer scheduling. A vm was left with clinic information.

## 2023-12-19 PROBLEM — D62 ACUTE POSTHEMORRHAGIC ANEMIA: Status: ACTIVE | Noted: 2023-01-01

## 2023-12-26 NOTE — PROGRESS NOTES
Infusion Nursing Note:  Fer Myles presents today for Injectafer #1/2.    Patient seen by provider today: No   present during visit today: Not Applicable.    Note: N/A.      Intravenous Access:  Peripheral IV placed.    Treatment Conditions:  Results reviewed, labs MET treatment parameters, ok to proceed with treatment.  Iron studies/Hgb noted from 10/22/23.      Post Infusion Assessment:  Patient tolerated infusion without incident.  Patient observed for 30 minutes post Injectafer per protocol.  Site patent and intact, free from redness, edema or discomfort.  No evidence of extravasations.  Access discontinued per protocol.       Discharge Plan:   [facility]  Patient will return 1/3/24 for Injectafer #2/2 for next appointment.   Patient discharged in stable condition accompanied by: self.  Departure Mode: Ambulatory.      Autumn Sidhu RN

## 2024-01-01 ENCOUNTER — APPOINTMENT (OUTPATIENT)
Dept: ULTRASOUND IMAGING | Facility: CLINIC | Age: 64
End: 2024-01-01
Attending: PODIATRIST
Payer: COMMERCIAL

## 2024-01-01 ENCOUNTER — ANESTHESIA EVENT (OUTPATIENT)
Dept: SURGERY | Facility: CLINIC | Age: 64
End: 2024-01-01
Payer: COMMERCIAL

## 2024-01-01 ENCOUNTER — MEDICAL CORRESPONDENCE (OUTPATIENT)
Dept: HEALTH INFORMATION MANAGEMENT | Facility: CLINIC | Age: 64
End: 2024-01-01
Payer: COMMERCIAL

## 2024-01-01 ENCOUNTER — APPOINTMENT (OUTPATIENT)
Dept: OCCUPATIONAL THERAPY | Facility: CLINIC | Age: 64
End: 2024-01-01
Attending: HOSPITALIST
Payer: COMMERCIAL

## 2024-01-01 ENCOUNTER — HOSPITAL ENCOUNTER (OUTPATIENT)
Dept: WOUND CARE | Facility: CLINIC | Age: 64
Discharge: HOME | End: 2024-06-26
Attending: REGISTERED NURSE | Admitting: REGISTERED NURSE
Payer: COMMERCIAL

## 2024-01-01 ENCOUNTER — TELEPHONE (OUTPATIENT)
Dept: WOUND CARE | Facility: CLINIC | Age: 64
End: 2024-01-01
Payer: COMMERCIAL

## 2024-01-01 ENCOUNTER — HOSPITAL ENCOUNTER (OUTPATIENT)
Dept: WOUND CARE | Facility: CLINIC | Age: 64
Discharge: HOME | End: 2024-07-01
Attending: PODIATRIST | Admitting: PODIATRIST
Payer: COMMERCIAL

## 2024-01-01 ENCOUNTER — APPOINTMENT (OUTPATIENT)
Dept: GENERAL RADIOLOGY | Facility: CLINIC | Age: 64
End: 2024-01-01
Attending: EMERGENCY MEDICINE
Payer: COMMERCIAL

## 2024-01-01 ENCOUNTER — HEALTH MAINTENANCE LETTER (OUTPATIENT)
Age: 64
End: 2024-01-01

## 2024-01-01 ENCOUNTER — APPOINTMENT (OUTPATIENT)
Dept: MRI IMAGING | Facility: CLINIC | Age: 64
End: 2024-01-01
Attending: INTERNAL MEDICINE
Payer: COMMERCIAL

## 2024-01-01 ENCOUNTER — ANESTHESIA (OUTPATIENT)
Dept: SURGERY | Facility: CLINIC | Age: 64
End: 2024-01-01
Payer: COMMERCIAL

## 2024-01-01 ENCOUNTER — PATIENT OUTREACH (OUTPATIENT)
Dept: CARE COORDINATION | Facility: CLINIC | Age: 64
End: 2024-01-01
Payer: COMMERCIAL

## 2024-01-01 ENCOUNTER — TELEPHONE (OUTPATIENT)
Dept: WOUND CARE | Facility: CLINIC | Age: 64
End: 2024-01-01

## 2024-01-01 ENCOUNTER — HOSPITAL ENCOUNTER (OUTPATIENT)
Facility: CLINIC | Age: 64
Discharge: HOME | End: 2024-08-14
Attending: STUDENT IN AN ORGANIZED HEALTH CARE EDUCATION/TRAINING PROGRAM | Admitting: STUDENT IN AN ORGANIZED HEALTH CARE EDUCATION/TRAINING PROGRAM
Payer: COMMERCIAL

## 2024-01-01 ENCOUNTER — APPOINTMENT (OUTPATIENT)
Dept: CT IMAGING | Facility: CLINIC | Age: 64
End: 2024-01-01
Attending: STUDENT IN AN ORGANIZED HEALTH CARE EDUCATION/TRAINING PROGRAM
Payer: COMMERCIAL

## 2024-01-01 ENCOUNTER — HOSPITAL ENCOUNTER (OUTPATIENT)
Facility: CLINIC | Age: 64
Discharge: HOME | End: 2024-05-10
Attending: EMERGENCY MEDICINE | Admitting: STUDENT IN AN ORGANIZED HEALTH CARE EDUCATION/TRAINING PROGRAM
Payer: COMMERCIAL

## 2024-01-01 ENCOUNTER — HOSPITAL ENCOUNTER (OUTPATIENT)
Dept: WOUND CARE | Facility: CLINIC | Age: 64
Discharge: HOME | End: 2024-05-16
Payer: COMMERCIAL

## 2024-01-01 ENCOUNTER — APPOINTMENT (OUTPATIENT)
Dept: GENERAL RADIOLOGY | Facility: CLINIC | Age: 64
End: 2024-01-01
Attending: STUDENT IN AN ORGANIZED HEALTH CARE EDUCATION/TRAINING PROGRAM
Payer: COMMERCIAL

## 2024-01-01 ENCOUNTER — HOSPITAL ENCOUNTER (OUTPATIENT)
Dept: WOUND CARE | Facility: CLINIC | Age: 64
Discharge: HOME | End: 2024-03-22
Payer: COMMERCIAL

## 2024-01-01 ENCOUNTER — INFUSION THERAPY VISIT (OUTPATIENT)
Dept: INFUSION THERAPY | Facility: CLINIC | Age: 64
End: 2024-01-01
Attending: FAMILY MEDICINE
Payer: COMMERCIAL

## 2024-01-01 ENCOUNTER — TRANSFERRED RECORDS (OUTPATIENT)
Dept: HEALTH INFORMATION MANAGEMENT | Facility: CLINIC | Age: 64
End: 2024-01-01
Payer: COMMERCIAL

## 2024-01-01 ENCOUNTER — HOSPITAL ENCOUNTER (OUTPATIENT)
Dept: WOUND CARE | Facility: CLINIC | Age: 64
Discharge: HOME | End: 2024-01-04
Payer: COMMERCIAL

## 2024-01-01 ENCOUNTER — HOSPITAL ENCOUNTER (INPATIENT)
Facility: CLINIC | Age: 64
LOS: 4 days | Discharge: HOME HEALTH | End: 2024-01-28
Attending: EMERGENCY MEDICINE | Admitting: INTERNAL MEDICINE
Payer: COMMERCIAL

## 2024-01-01 ENCOUNTER — HOSPITAL ENCOUNTER (OUTPATIENT)
Dept: WOUND CARE | Facility: CLINIC | Age: 64
Discharge: HOME | End: 2024-01-19
Payer: COMMERCIAL

## 2024-01-01 ENCOUNTER — HOSPITAL ENCOUNTER (OUTPATIENT)
Dept: WOUND CARE | Facility: CLINIC | Age: 64
Discharge: HOME | End: 2024-07-22
Attending: PODIATRIST | Admitting: PODIATRIST
Payer: COMMERCIAL

## 2024-01-01 ENCOUNTER — HOSPITAL ENCOUNTER (OUTPATIENT)
Dept: WOUND CARE | Facility: CLINIC | Age: 64
Discharge: HOME | End: 2024-07-19
Attending: REGISTERED NURSE
Payer: COMMERCIAL

## 2024-01-01 ENCOUNTER — HOSPITAL ENCOUNTER (OUTPATIENT)
Dept: WOUND CARE | Facility: CLINIC | Age: 64
Discharge: HOME | End: 2024-02-02
Payer: COMMERCIAL

## 2024-01-01 ENCOUNTER — OFFICE VISIT (OUTPATIENT)
Dept: PODIATRY | Facility: CLINIC | Age: 64
End: 2024-01-01
Payer: COMMERCIAL

## 2024-01-01 ENCOUNTER — HOSPITAL ENCOUNTER (OUTPATIENT)
Dept: WOUND CARE | Facility: CLINIC | Age: 64
Discharge: HOME | End: 2024-02-22
Payer: COMMERCIAL

## 2024-01-01 ENCOUNTER — HOSPITAL ENCOUNTER (OUTPATIENT)
Dept: WOUND CARE | Facility: CLINIC | Age: 64
Discharge: HOME | End: 2024-02-05
Attending: PODIATRIST | Admitting: PODIATRIST
Payer: COMMERCIAL

## 2024-01-01 ENCOUNTER — MEDICAL CORRESPONDENCE (OUTPATIENT)
Dept: HEALTH INFORMATION MANAGEMENT | Facility: CLINIC | Age: 64
End: 2024-01-01

## 2024-01-01 ENCOUNTER — HOSPITAL ENCOUNTER (OUTPATIENT)
Dept: WOUND CARE | Facility: CLINIC | Age: 64
Discharge: HOME | End: 2024-04-18
Payer: COMMERCIAL

## 2024-01-01 VITALS
TEMPERATURE: 97.8 F | WEIGHT: 229.28 LBS | DIASTOLIC BLOOD PRESSURE: 71 MMHG | RESPIRATION RATE: 16 BRPM | BODY MASS INDEX: 32.1 KG/M2 | HEART RATE: 63 BPM | HEIGHT: 71 IN | SYSTOLIC BLOOD PRESSURE: 119 MMHG | OXYGEN SATURATION: 100 %

## 2024-01-01 VITALS — HEART RATE: 50 BPM | SYSTOLIC BLOOD PRESSURE: 115 MMHG | TEMPERATURE: 96.3 F | DIASTOLIC BLOOD PRESSURE: 59 MMHG

## 2024-01-01 VITALS — SYSTOLIC BLOOD PRESSURE: 145 MMHG | DIASTOLIC BLOOD PRESSURE: 80 MMHG | HEART RATE: 58 BPM | TEMPERATURE: 97.1 F

## 2024-01-01 VITALS — HEART RATE: 66 BPM | TEMPERATURE: 97.5 F | SYSTOLIC BLOOD PRESSURE: 140 MMHG | DIASTOLIC BLOOD PRESSURE: 88 MMHG

## 2024-01-01 VITALS — TEMPERATURE: 97 F | SYSTOLIC BLOOD PRESSURE: 136 MMHG | DIASTOLIC BLOOD PRESSURE: 77 MMHG | HEART RATE: 60 BPM

## 2024-01-01 VITALS
SYSTOLIC BLOOD PRESSURE: 143 MMHG | RESPIRATION RATE: 20 BRPM | HEART RATE: 77 BPM | TEMPERATURE: 96.3 F | DIASTOLIC BLOOD PRESSURE: 87 MMHG

## 2024-01-01 VITALS
WEIGHT: 224 LBS | BODY MASS INDEX: 31.24 KG/M2 | HEART RATE: 69 BPM | SYSTOLIC BLOOD PRESSURE: 107 MMHG | RESPIRATION RATE: 18 BRPM | DIASTOLIC BLOOD PRESSURE: 60 MMHG | OXYGEN SATURATION: 99 % | TEMPERATURE: 98.3 F

## 2024-01-01 VITALS — SYSTOLIC BLOOD PRESSURE: 106 MMHG | TEMPERATURE: 97.1 F | DIASTOLIC BLOOD PRESSURE: 71 MMHG | HEART RATE: 139 BPM

## 2024-01-01 VITALS — HEART RATE: 125 BPM | TEMPERATURE: 96.8 F | DIASTOLIC BLOOD PRESSURE: 88 MMHG | SYSTOLIC BLOOD PRESSURE: 139 MMHG

## 2024-01-01 VITALS
RESPIRATION RATE: 18 BRPM | OXYGEN SATURATION: 98 % | SYSTOLIC BLOOD PRESSURE: 116 MMHG | HEART RATE: 61 BPM | TEMPERATURE: 98.3 F | DIASTOLIC BLOOD PRESSURE: 74 MMHG

## 2024-01-01 VITALS
DIASTOLIC BLOOD PRESSURE: 85 MMHG | WEIGHT: 264 LBS | HEIGHT: 71 IN | HEART RATE: 61 BPM | TEMPERATURE: 96.3 F | BODY MASS INDEX: 36.96 KG/M2 | SYSTOLIC BLOOD PRESSURE: 148 MMHG

## 2024-01-01 VITALS — WEIGHT: 229 LBS | SYSTOLIC BLOOD PRESSURE: 110 MMHG | DIASTOLIC BLOOD PRESSURE: 64 MMHG | BODY MASS INDEX: 31.94 KG/M2

## 2024-01-01 VITALS — HEART RATE: 51 BPM | SYSTOLIC BLOOD PRESSURE: 140 MMHG | DIASTOLIC BLOOD PRESSURE: 77 MMHG | TEMPERATURE: 97.4 F

## 2024-01-01 VITALS
RESPIRATION RATE: 18 BRPM | OXYGEN SATURATION: 99 % | DIASTOLIC BLOOD PRESSURE: 73 MMHG | SYSTOLIC BLOOD PRESSURE: 119 MMHG | HEART RATE: 67 BPM | TEMPERATURE: 98.5 F

## 2024-01-01 VITALS
BODY MASS INDEX: 31.51 KG/M2 | TEMPERATURE: 97 F | DIASTOLIC BLOOD PRESSURE: 82 MMHG | SYSTOLIC BLOOD PRESSURE: 130 MMHG | WEIGHT: 225.9 LBS | OXYGEN SATURATION: 99 % | RESPIRATION RATE: 18 BRPM | HEART RATE: 72 BPM

## 2024-01-01 VITALS
RESPIRATION RATE: 18 BRPM | OXYGEN SATURATION: 95 % | SYSTOLIC BLOOD PRESSURE: 136 MMHG | HEIGHT: 71 IN | DIASTOLIC BLOOD PRESSURE: 80 MMHG | WEIGHT: 237 LBS | BODY MASS INDEX: 33.18 KG/M2 | HEART RATE: 70 BPM | TEMPERATURE: 97.2 F

## 2024-01-01 VITALS
RESPIRATION RATE: 18 BRPM | TEMPERATURE: 96.8 F | OXYGEN SATURATION: 99 % | DIASTOLIC BLOOD PRESSURE: 77 MMHG | SYSTOLIC BLOOD PRESSURE: 149 MMHG | HEART RATE: 65 BPM

## 2024-01-01 VITALS
TEMPERATURE: 96.8 F | RESPIRATION RATE: 15 BRPM | HEART RATE: 60 BPM | DIASTOLIC BLOOD PRESSURE: 75 MMHG | SYSTOLIC BLOOD PRESSURE: 126 MMHG

## 2024-01-01 VITALS — TEMPERATURE: 96.3 F | SYSTOLIC BLOOD PRESSURE: 120 MMHG | HEART RATE: 55 BPM | DIASTOLIC BLOOD PRESSURE: 73 MMHG

## 2024-01-01 VITALS — DIASTOLIC BLOOD PRESSURE: 72 MMHG | SYSTOLIC BLOOD PRESSURE: 115 MMHG | HEART RATE: 57 BPM | TEMPERATURE: 96.5 F

## 2024-01-01 DIAGNOSIS — L97.522 CHRONIC TOE ULCER, LEFT, WITH FAT LAYER EXPOSED (H): ICD-10-CM

## 2024-01-01 DIAGNOSIS — L98.422 SKIN ULCER OF BACK WITH FAT LAYER EXPOSED (H): ICD-10-CM

## 2024-01-01 DIAGNOSIS — L97.912 CHRONIC ULCER OF RIGHT LEG WITH FAT LAYER EXPOSED (H): Primary | Chronic | ICD-10-CM

## 2024-01-01 DIAGNOSIS — L97.512 CHRONIC TOE ULCER, RIGHT, WITH FAT LAYER EXPOSED (H): ICD-10-CM

## 2024-01-01 DIAGNOSIS — D50.0 IRON DEFICIENCY ANEMIA SECONDARY TO BLOOD LOSS (CHRONIC): Primary | ICD-10-CM

## 2024-01-01 DIAGNOSIS — L97.512 CHRONIC TOE ULCER, RIGHT, WITH FAT LAYER EXPOSED (H): Primary | ICD-10-CM

## 2024-01-01 DIAGNOSIS — L97.112 SKIN ULCER OF RIGHT THIGH WITH FAT LAYER EXPOSED (H): ICD-10-CM

## 2024-01-01 DIAGNOSIS — T81.30XA WOUND DEHISCENCE: ICD-10-CM

## 2024-01-01 DIAGNOSIS — Z09 SURGERY FOLLOW-UP EXAMINATION: ICD-10-CM

## 2024-01-01 DIAGNOSIS — L97.511 ULCER OF RIGHT SECOND TOE, LIMITED TO BREAKDOWN OF SKIN (H): ICD-10-CM

## 2024-01-01 DIAGNOSIS — L97.912 CHRONIC ULCER OF RIGHT LEG WITH FAT LAYER EXPOSED (H): ICD-10-CM

## 2024-01-01 DIAGNOSIS — S43.004A SHOULDER DISLOCATION, RIGHT, INITIAL ENCOUNTER: ICD-10-CM

## 2024-01-01 DIAGNOSIS — T81.30XA WOUND DEHISCENCE: Primary | ICD-10-CM

## 2024-01-01 DIAGNOSIS — L97.522 CHRONIC TOE ULCER, LEFT, WITH FAT LAYER EXPOSED (H): Primary | ICD-10-CM

## 2024-01-01 DIAGNOSIS — M86.9 OSTEOMYELITIS OF RIGHT FOOT, UNSPECIFIED TYPE (H): ICD-10-CM

## 2024-01-01 DIAGNOSIS — I89.0 LYMPHEDEMA: Chronic | ICD-10-CM

## 2024-01-01 DIAGNOSIS — G62.9 NEUROPATHY: Chronic | ICD-10-CM

## 2024-01-01 DIAGNOSIS — L97.912 CHRONIC ULCER OF RIGHT LEG WITH FAT LAYER EXPOSED (H): Primary | ICD-10-CM

## 2024-01-01 DIAGNOSIS — I73.9 PAD (PERIPHERAL ARTERY DISEASE) (H): Chronic | ICD-10-CM

## 2024-01-01 DIAGNOSIS — D62 ACUTE POSTHEMORRHAGIC ANEMIA: Primary | ICD-10-CM

## 2024-01-01 DIAGNOSIS — A41.9 SEPSIS, DUE TO UNSPECIFIED ORGANISM, UNSPECIFIED WHETHER ACUTE ORGAN DYSFUNCTION PRESENT (H): ICD-10-CM

## 2024-01-01 DIAGNOSIS — L03.032 CELLULITIS OF TOE OF LEFT FOOT: Primary | ICD-10-CM

## 2024-01-01 DIAGNOSIS — G89.18 POSTOPERATIVE PAIN: Primary | ICD-10-CM

## 2024-01-01 DIAGNOSIS — T14.8XXA OPEN WOUND: Primary | ICD-10-CM

## 2024-01-01 LAB
ALBUMIN SERPL BCG-MCNC: 2.6 G/DL (ref 3.5–5.2)
ALBUMIN SERPL BCG-MCNC: 2.9 G/DL (ref 3.5–5.2)
ALP SERPL-CCNC: 119 U/L (ref 40–150)
ALP SERPL-CCNC: 143 U/L (ref 40–150)
ALT SERPL W P-5'-P-CCNC: 5 U/L (ref 0–70)
ALT SERPL W P-5'-P-CCNC: 6 U/L (ref 0–70)
ANION GAP SERPL CALCULATED.3IONS-SCNC: 6 MMOL/L (ref 7–15)
ANION GAP SERPL CALCULATED.3IONS-SCNC: 8 MMOL/L (ref 7–15)
AST SERPL W P-5'-P-CCNC: 17 U/L (ref 0–45)
AST SERPL W P-5'-P-CCNC: 19 U/L (ref 0–45)
BACTERIA BLD CULT: NO GROWTH
BACTERIA BLD CULT: NO GROWTH
BACTERIA TISS BX CULT: ABNORMAL
BACTERIA TISS BX CULT: NORMAL
BASOPHILS # BLD AUTO: 0 10E3/UL (ref 0–0.2)
BASOPHILS NFR BLD AUTO: 0 %
BILIRUB SERPL-MCNC: 0.3 MG/DL
BILIRUB SERPL-MCNC: 0.3 MG/DL
BUN SERPL-MCNC: 5.7 MG/DL (ref 8–23)
BUN SERPL-MCNC: 9.8 MG/DL (ref 8–23)
CALCIUM SERPL-MCNC: 8 MG/DL (ref 8.8–10.2)
CALCIUM SERPL-MCNC: 8.1 MG/DL (ref 8.8–10.2)
CHLORIDE SERPL-SCNC: 103 MMOL/L (ref 98–107)
CHLORIDE SERPL-SCNC: 97 MMOL/L (ref 98–107)
CHOLEST SERPL-MCNC: 60 MG/DL
CREAT SERPL-MCNC: 0.71 MG/DL (ref 0.67–1.17)
CREAT SERPL-MCNC: 0.83 MG/DL (ref 0.67–1.17)
CRP SERPL-MCNC: 192.53 MG/L
DEPRECATED HCO3 PLAS-SCNC: 27 MMOL/L (ref 22–29)
DEPRECATED HCO3 PLAS-SCNC: 30 MMOL/L (ref 22–29)
EGFRCR SERPLBLD CKD-EPI 2021: >90 ML/MIN/1.73M2
EGFRCR SERPLBLD CKD-EPI 2021: >90 ML/MIN/1.73M2
EOSINOPHIL # BLD AUTO: 0.1 10E3/UL (ref 0–0.7)
EOSINOPHIL NFR BLD AUTO: 2 %
ERYTHROCYTE [DISTWIDTH] IN BLOOD BY AUTOMATED COUNT: 20.4 % (ref 10–15)
ERYTHROCYTE [DISTWIDTH] IN BLOOD BY AUTOMATED COUNT: 21.6 % (ref 10–15)
ERYTHROCYTE [SEDIMENTATION RATE] IN BLOOD BY WESTERGREN METHOD: 61 MM/HR (ref 0–20)
FOLATE SERPL-MCNC: 2.1 NG/ML (ref 4.6–34.8)
GLUCOSE BLDC GLUCOMTR-MCNC: 100 MG/DL (ref 70–99)
GLUCOSE BLDC GLUCOMTR-MCNC: 104 MG/DL (ref 70–99)
GLUCOSE BLDC GLUCOMTR-MCNC: 106 MG/DL (ref 70–99)
GLUCOSE BLDC GLUCOMTR-MCNC: 107 MG/DL (ref 70–99)
GLUCOSE BLDC GLUCOMTR-MCNC: 122 MG/DL (ref 70–99)
GLUCOSE BLDC GLUCOMTR-MCNC: 134 MG/DL (ref 70–99)
GLUCOSE BLDC GLUCOMTR-MCNC: 158 MG/DL (ref 70–99)
GLUCOSE BLDC GLUCOMTR-MCNC: 75 MG/DL (ref 70–99)
GLUCOSE BLDC GLUCOMTR-MCNC: 77 MG/DL (ref 70–99)
GLUCOSE BLDC GLUCOMTR-MCNC: 79 MG/DL (ref 70–99)
GLUCOSE BLDC GLUCOMTR-MCNC: 80 MG/DL (ref 70–99)
GLUCOSE BLDC GLUCOMTR-MCNC: 82 MG/DL (ref 70–99)
GLUCOSE BLDC GLUCOMTR-MCNC: 85 MG/DL (ref 70–99)
GLUCOSE BLDC GLUCOMTR-MCNC: 87 MG/DL (ref 70–99)
GLUCOSE BLDC GLUCOMTR-MCNC: 87 MG/DL (ref 70–99)
GLUCOSE BLDC GLUCOMTR-MCNC: 89 MG/DL (ref 70–99)
GLUCOSE BLDC GLUCOMTR-MCNC: 91 MG/DL (ref 70–99)
GLUCOSE BLDC GLUCOMTR-MCNC: 92 MG/DL (ref 70–99)
GLUCOSE BLDC GLUCOMTR-MCNC: 94 MG/DL (ref 70–99)
GLUCOSE BLDC GLUCOMTR-MCNC: 98 MG/DL (ref 70–99)
GLUCOSE SERPL-MCNC: 139 MG/DL (ref 70–99)
GLUCOSE SERPL-MCNC: 90 MG/DL (ref 70–99)
GRAM STAIN RESULT: NORMAL
GRAM STAIN RESULT: NORMAL
HBA1C MFR BLD: 4.8 %
HCO3 BLDV-SCNC: 29 MMOL/L (ref 21–28)
HCT VFR BLD AUTO: 27.5 % (ref 40–53)
HCT VFR BLD AUTO: 29.2 % (ref 40–53)
HDLC SERPL-MCNC: 21 MG/DL
HGB BLD-MCNC: 9 G/DL (ref 13.3–17.7)
HGB BLD-MCNC: 9.2 G/DL (ref 13.3–17.7)
HGB BLD-MCNC: 9.6 G/DL (ref 13.3–17.7)
IMM GRANULOCYTES # BLD: 0 10E3/UL
IMM GRANULOCYTES NFR BLD: 0 %
IRON BINDING CAPACITY (ROCHE): 147 UG/DL (ref 240–430)
IRON SATN MFR SERPL: 14 % (ref 15–46)
IRON SERPL-MCNC: 21 UG/DL (ref 61–157)
LACTATE BLD-SCNC: 0.9 MMOL/L
LACTATE SERPL-SCNC: 2.1 MMOL/L (ref 0.7–2)
LDLC SERPL CALC-MCNC: 14 MG/DL
LYMPHOCYTES # BLD AUTO: 0.8 10E3/UL (ref 0.8–5.3)
LYMPHOCYTES NFR BLD AUTO: 12 %
MAGNESIUM SERPL-MCNC: 2 MG/DL (ref 1.7–2.3)
MAGNESIUM SERPL-MCNC: 2.1 MG/DL (ref 1.7–2.3)
MCH RBC QN AUTO: 35.3 PG (ref 26.5–33)
MCH RBC QN AUTO: 35.9 PG (ref 26.5–33)
MCHC RBC AUTO-ENTMCNC: 32.9 G/DL (ref 31.5–36.5)
MCHC RBC AUTO-ENTMCNC: 33.5 G/DL (ref 31.5–36.5)
MCV RBC AUTO: 107 FL (ref 78–100)
MCV RBC AUTO: 107 FL (ref 78–100)
MONOCYTES # BLD AUTO: 0.5 10E3/UL (ref 0–1.3)
MONOCYTES NFR BLD AUTO: 8 %
NEUTROPHILS # BLD AUTO: 5.3 10E3/UL (ref 1.6–8.3)
NEUTROPHILS NFR BLD AUTO: 78 %
NONHDLC SERPL-MCNC: 39 MG/DL
NRBC # BLD AUTO: 0 10E3/UL
NRBC BLD AUTO-RTO: 0 /100
NT-PROBNP SERPL-MCNC: 3098 PG/ML (ref 0–900)
PATH REPORT.COMMENTS IMP SPEC: NORMAL
PATH REPORT.COMMENTS IMP SPEC: NORMAL
PATH REPORT.FINAL DX SPEC: NORMAL
PATH REPORT.GROSS SPEC: NORMAL
PATH REPORT.MICROSCOPIC SPEC OTHER STN: NORMAL
PATH REPORT.RELEVANT HX SPEC: NORMAL
PCO2 BLDV: 45 MM HG (ref 40–50)
PH BLDV: 7.42 [PH] (ref 7.32–7.43)
PHOTO IMAGE: NORMAL
PLATELET # BLD AUTO: 188 10E3/UL (ref 150–450)
PLATELET # BLD AUTO: 244 10E3/UL (ref 150–450)
PO2 BLDV: 21 MM HG (ref 25–47)
POTASSIUM SERPL-SCNC: 3.5 MMOL/L (ref 3.4–5.3)
POTASSIUM SERPL-SCNC: 3.6 MMOL/L (ref 3.4–5.3)
POTASSIUM SERPL-SCNC: 3.6 MMOL/L (ref 3.4–5.3)
POTASSIUM SERPL-SCNC: 4 MMOL/L (ref 3.4–5.3)
PROT SERPL-MCNC: 6.3 G/DL (ref 6.4–8.3)
PROT SERPL-MCNC: 6.7 G/DL (ref 6.4–8.3)
RBC # BLD AUTO: 2.56 10E6/UL (ref 4.4–5.9)
RBC # BLD AUTO: 2.72 10E6/UL (ref 4.4–5.9)
SAO2 % BLDV: 35 % (ref 70–75)
SODIUM SERPL-SCNC: 135 MMOL/L (ref 135–145)
SODIUM SERPL-SCNC: 136 MMOL/L (ref 135–145)
TRIGL SERPL-MCNC: 125 MG/DL
VIT B12 SERPL-MCNC: 694 PG/ML (ref 232–1245)
WBC # BLD AUTO: 4.5 10E3/UL (ref 4–11)
WBC # BLD AUTO: 6.8 10E3/UL (ref 4–11)

## 2024-01-01 PROCEDURE — 99214 OFFICE O/P EST MOD 30 MIN: CPT | Mod: 25 | Performed by: REGISTERED NURSE

## 2024-01-01 PROCEDURE — 11042 DBRDMT SUBQ TIS 1ST 20SQCM/<: CPT | Performed by: PODIATRIST

## 2024-01-01 PROCEDURE — 88311 DECALCIFY TISSUE: CPT | Mod: TC | Performed by: PODIATRIST

## 2024-01-01 PROCEDURE — 82746 ASSAY OF FOLIC ACID SERUM: CPT | Performed by: INTERNAL MEDICINE

## 2024-01-01 PROCEDURE — 250N000011 HC RX IP 250 OP 636: Mod: JZ | Performed by: INTERNAL MEDICINE

## 2024-01-01 PROCEDURE — C1713 ANCHOR/SCREW BN/BN,TIS/BN: HCPCS | Performed by: STUDENT IN AN ORGANIZED HEALTH CARE EDUCATION/TRAINING PROGRAM

## 2024-01-01 PROCEDURE — 250N000013 HC RX MED GY IP 250 OP 250 PS 637: Performed by: INTERNAL MEDICINE

## 2024-01-01 PROCEDURE — 82803 BLOOD GASES ANY COMBINATION: CPT

## 2024-01-01 PROCEDURE — 85025 COMPLETE CBC W/AUTO DIFF WBC: CPT | Performed by: EMERGENCY MEDICINE

## 2024-01-01 PROCEDURE — 250N000009 HC RX 250: Performed by: NURSE ANESTHETIST, CERTIFIED REGISTERED

## 2024-01-01 PROCEDURE — 258N000003 HC RX IP 258 OP 636: Performed by: ANESTHESIOLOGY

## 2024-01-01 PROCEDURE — 250N000011 HC RX IP 250 OP 636: Performed by: NURSE ANESTHETIST, CERTIFIED REGISTERED

## 2024-01-01 PROCEDURE — 99223 1ST HOSP IP/OBS HIGH 75: CPT | Performed by: INTERNAL MEDICINE

## 2024-01-01 PROCEDURE — 99222 1ST HOSP IP/OBS MODERATE 55: CPT | Mod: GC | Performed by: SURGERY

## 2024-01-01 PROCEDURE — 370N000017 HC ANESTHESIA TECHNICAL FEE, PER MIN: Performed by: STUDENT IN AN ORGANIZED HEALTH CARE EDUCATION/TRAINING PROGRAM

## 2024-01-01 PROCEDURE — 28010 INCISION OF TOE TENDON: CPT | Mod: TA | Performed by: PODIATRIST

## 2024-01-01 PROCEDURE — 36415 COLL VENOUS BLD VENIPUNCTURE: CPT | Performed by: EMERGENCY MEDICINE

## 2024-01-01 PROCEDURE — 80061 LIPID PANEL: CPT | Performed by: HOSPITALIST

## 2024-01-01 PROCEDURE — 97602 WOUND(S) CARE NON-SELECTIVE: CPT

## 2024-01-01 PROCEDURE — 250N000025 HC SEVOFLURANE, PER MIN: Performed by: STUDENT IN AN ORGANIZED HEALTH CARE EDUCATION/TRAINING PROGRAM

## 2024-01-01 PROCEDURE — 73660 X-RAY EXAM OF TOE(S): CPT | Mod: RT

## 2024-01-01 PROCEDURE — 96365 THER/PROPH/DIAG IV INF INIT: CPT

## 2024-01-01 PROCEDURE — 96361 HYDRATE IV INFUSION ADD-ON: CPT

## 2024-01-01 PROCEDURE — 120N000001 HC R&B MED SURG/OB

## 2024-01-01 PROCEDURE — 710N000009 HC RECOVERY PHASE 1, LEVEL 1, PER MIN: Performed by: STUDENT IN AN ORGANIZED HEALTH CARE EDUCATION/TRAINING PROGRAM

## 2024-01-01 PROCEDURE — 99213 OFFICE O/P EST LOW 20 MIN: CPT | Mod: 25

## 2024-01-01 PROCEDURE — 73030 X-RAY EXAM OF SHOULDER: CPT | Mod: RT

## 2024-01-01 PROCEDURE — 23650 CLTX SHO DSLC W/MNPJ WO ANES: CPT | Mod: RT

## 2024-01-01 PROCEDURE — 84132 ASSAY OF SERUM POTASSIUM: CPT | Performed by: HOSPITALIST

## 2024-01-01 PROCEDURE — 710N000012 HC RECOVERY PHASE 2, PER MINUTE: Performed by: STUDENT IN AN ORGANIZED HEALTH CARE EDUCATION/TRAINING PROGRAM

## 2024-01-01 PROCEDURE — 250N000013 HC RX MED GY IP 250 OP 250 PS 637: Performed by: PODIATRIST

## 2024-01-01 PROCEDURE — 99239 HOSP IP/OBS DSCHRG MGMT >30: CPT | Performed by: STUDENT IN AN ORGANIZED HEALTH CARE EDUCATION/TRAINING PROGRAM

## 2024-01-01 PROCEDURE — 97597 DBRDMT OPN WND 1ST 20 CM/<: CPT

## 2024-01-01 PROCEDURE — 999N000157 HC STATISTIC RCP TIME EA 10 MIN

## 2024-01-01 PROCEDURE — 99233 SBSQ HOSP IP/OBS HIGH 50: CPT | Performed by: STUDENT IN AN ORGANIZED HEALTH CARE EDUCATION/TRAINING PROGRAM

## 2024-01-01 PROCEDURE — 250N000013 HC RX MED GY IP 250 OP 250 PS 637: Performed by: HOSPITALIST

## 2024-01-01 PROCEDURE — 99233 SBSQ HOSP IP/OBS HIGH 50: CPT | Performed by: HOSPITALIST

## 2024-01-01 PROCEDURE — 258N000003 HC RX IP 258 OP 636

## 2024-01-01 PROCEDURE — 84132 ASSAY OF SERUM POTASSIUM: CPT | Performed by: INTERNAL MEDICINE

## 2024-01-01 PROCEDURE — 87040 BLOOD CULTURE FOR BACTERIA: CPT | Performed by: EMERGENCY MEDICINE

## 2024-01-01 PROCEDURE — 86140 C-REACTIVE PROTEIN: CPT | Performed by: INTERNAL MEDICINE

## 2024-01-01 PROCEDURE — 96366 THER/PROPH/DIAG IV INF ADDON: CPT

## 2024-01-01 PROCEDURE — 272N000001 HC OR GENERAL SUPPLY STERILE: Performed by: PODIATRIST

## 2024-01-01 PROCEDURE — 96376 TX/PRO/DX INJ SAME DRUG ADON: CPT | Mod: 59

## 2024-01-01 PROCEDURE — 97535 SELF CARE MNGMENT TRAINING: CPT | Mod: GO

## 2024-01-01 PROCEDURE — 80053 COMPREHEN METABOLIC PANEL: CPT | Performed by: HOSPITALIST

## 2024-01-01 PROCEDURE — 250N000011 HC RX IP 250 OP 636: Performed by: PODIATRIST

## 2024-01-01 PROCEDURE — 85652 RBC SED RATE AUTOMATED: CPT | Performed by: INTERNAL MEDICINE

## 2024-01-01 PROCEDURE — 250N000011 HC RX IP 250 OP 636: Performed by: INTERNAL MEDICINE

## 2024-01-01 PROCEDURE — 11042 DBRDMT SUBQ TIS 1ST 20SQCM/<: CPT

## 2024-01-01 PROCEDURE — 11042 DBRDMT SUBQ TIS 1ST 20SQCM/<: CPT | Mod: 51 | Performed by: PODIATRIST

## 2024-01-01 PROCEDURE — 83735 ASSAY OF MAGNESIUM: CPT | Performed by: HOSPITALIST

## 2024-01-01 PROCEDURE — 250N000011 HC RX IP 250 OP 636: Mod: JZ

## 2024-01-01 PROCEDURE — 83735 ASSAY OF MAGNESIUM: CPT | Performed by: INTERNAL MEDICINE

## 2024-01-01 PROCEDURE — 73720 MRI LWR EXTREMITY W/O&W/DYE: CPT | Mod: RT

## 2024-01-01 PROCEDURE — A9585 GADOBUTROL INJECTION: HCPCS | Performed by: INTERNAL MEDICINE

## 2024-01-01 PROCEDURE — 99285 EMERGENCY DEPT VISIT HI MDM: CPT | Mod: 25

## 2024-01-01 PROCEDURE — 250N000011 HC RX IP 250 OP 636: Performed by: STUDENT IN AN ORGANIZED HEALTH CARE EDUCATION/TRAINING PROGRAM

## 2024-01-01 PROCEDURE — 99213 OFFICE O/P EST LOW 20 MIN: CPT | Mod: 57 | Performed by: PODIATRIST

## 2024-01-01 PROCEDURE — 73200 CT UPPER EXTREMITY W/O DYE: CPT | Mod: RT

## 2024-01-01 PROCEDURE — 97597 DBRDMT OPN WND 1ST 20 CM/<: CPT | Mod: 58

## 2024-01-01 PROCEDURE — 272N000001 HC OR GENERAL SUPPLY STERILE: Performed by: STUDENT IN AN ORGANIZED HEALTH CARE EDUCATION/TRAINING PROGRAM

## 2024-01-01 PROCEDURE — 999N000141 HC STATISTIC PRE-PROCEDURE NURSING ASSESSMENT: Performed by: STUDENT IN AN ORGANIZED HEALTH CARE EDUCATION/TRAINING PROGRAM

## 2024-01-01 PROCEDURE — 250N000009 HC RX 250: Performed by: PODIATRIST

## 2024-01-01 PROCEDURE — 272N000002 HC OR SUPPLY OTHER OPNP: Performed by: STUDENT IN AN ORGANIZED HEALTH CARE EDUCATION/TRAINING PROGRAM

## 2024-01-01 PROCEDURE — 99213 OFFICE O/P EST LOW 20 MIN: CPT | Mod: 25 | Performed by: PODIATRIST

## 2024-01-01 PROCEDURE — 99024 POSTOP FOLLOW-UP VISIT: CPT | Mod: 25

## 2024-01-01 PROCEDURE — 710N000009 HC RECOVERY PHASE 1, LEVEL 1, PER MIN: Performed by: PODIATRIST

## 2024-01-01 PROCEDURE — 82607 VITAMIN B-12: CPT | Performed by: INTERNAL MEDICINE

## 2024-01-01 PROCEDURE — 360N000082 HC SURGERY LEVEL 2 W/ FLUORO, PER MIN: Performed by: STUDENT IN AN ORGANIZED HEALTH CARE EDUCATION/TRAINING PROGRAM

## 2024-01-01 PROCEDURE — 99214 OFFICE O/P EST MOD 30 MIN: CPT | Mod: 25

## 2024-01-01 PROCEDURE — 87205 SMEAR GRAM STAIN: CPT | Performed by: PODIATRIST

## 2024-01-01 PROCEDURE — 97165 OT EVAL LOW COMPLEX 30 MIN: CPT | Mod: GO

## 2024-01-01 PROCEDURE — 87075 CULTR BACTERIA EXCEPT BLOOD: CPT | Performed by: PODIATRIST

## 2024-01-01 PROCEDURE — 250N000011 HC RX IP 250 OP 636

## 2024-01-01 PROCEDURE — 99024 POSTOP FOLLOW-UP VISIT: CPT | Performed by: PODIATRIST

## 2024-01-01 PROCEDURE — 99213 OFFICE O/P EST LOW 20 MIN: CPT | Mod: 24

## 2024-01-01 PROCEDURE — 96374 THER/PROPH/DIAG INJ IV PUSH: CPT | Mod: 59

## 2024-01-01 PROCEDURE — 99232 SBSQ HOSP IP/OBS MODERATE 35: CPT | Performed by: PODIATRIST

## 2024-01-01 PROCEDURE — 83880 ASSAY OF NATRIURETIC PEPTIDE: CPT | Performed by: HOSPITALIST

## 2024-01-01 PROCEDURE — 85018 HEMOGLOBIN: CPT | Performed by: EMERGENCY MEDICINE

## 2024-01-01 PROCEDURE — 258N000003 HC RX IP 258 OP 636: Mod: JZ

## 2024-01-01 PROCEDURE — 999N000147 HC STATISTIC PT IP EVAL DEFER: Performed by: PHYSICAL THERAPIST

## 2024-01-01 PROCEDURE — 82962 GLUCOSE BLOOD TEST: CPT

## 2024-01-01 PROCEDURE — G0463 HOSPITAL OUTPT CLINIC VISIT: HCPCS

## 2024-01-01 PROCEDURE — 83550 IRON BINDING TEST: CPT | Performed by: INTERNAL MEDICINE

## 2024-01-01 PROCEDURE — 999N000141 HC STATISTIC PRE-PROCEDURE NURSING ASSESSMENT: Performed by: PODIATRIST

## 2024-01-01 PROCEDURE — 250N000013 HC RX MED GY IP 250 OP 250 PS 637: Performed by: STUDENT IN AN ORGANIZED HEALTH CARE EDUCATION/TRAINING PROGRAM

## 2024-01-01 PROCEDURE — 88305 TISSUE EXAM BY PATHOLOGIST: CPT | Mod: 26 | Performed by: PATHOLOGY

## 2024-01-01 PROCEDURE — 0Y6R0Z0 DETACHMENT AT RIGHT 2ND TOE, COMPLETE, OPEN APPROACH: ICD-10-PCS | Performed by: PODIATRIST

## 2024-01-01 PROCEDURE — 17250 CHEM CAUT OF GRANLTJ TISSUE: CPT

## 2024-01-01 PROCEDURE — 94660 CPAP INITIATION&MGMT: CPT

## 2024-01-01 PROCEDURE — 250N000011 HC RX IP 250 OP 636: Performed by: ANESTHESIOLOGY

## 2024-01-01 PROCEDURE — 11043 DBRDMT MUSC&/FSCA 1ST 20/<: CPT

## 2024-01-01 PROCEDURE — 11720 DEBRIDE NAIL 1-5: CPT | Mod: 59 | Performed by: REGISTERED NURSE

## 2024-01-01 PROCEDURE — 250N000011 HC RX IP 250 OP 636: Performed by: HOSPITALIST

## 2024-01-01 PROCEDURE — 11042 DBRDMT SUBQ TIS 1ST 20SQCM/<: CPT | Mod: XS | Performed by: PODIATRIST

## 2024-01-01 PROCEDURE — 99232 SBSQ HOSP IP/OBS MODERATE 35: CPT | Performed by: SPECIALIST

## 2024-01-01 PROCEDURE — 258N000003 HC RX IP 258 OP 636: Performed by: HOSPITALIST

## 2024-01-01 PROCEDURE — 250N000011 HC RX IP 250 OP 636: Performed by: EMERGENCY MEDICINE

## 2024-01-01 PROCEDURE — 99213 OFFICE O/P EST LOW 20 MIN: CPT

## 2024-01-01 PROCEDURE — 250N000011 HC RX IP 250 OP 636: Mod: JZ | Performed by: EMERGENCY MEDICINE

## 2024-01-01 PROCEDURE — 11042 DBRDMT SUBQ TIS 1ST 20SQCM/<: CPT | Mod: 58 | Performed by: PODIATRIST

## 2024-01-01 PROCEDURE — 85027 COMPLETE CBC AUTOMATED: CPT | Performed by: HOSPITALIST

## 2024-01-01 PROCEDURE — 11042 DBRDMT SUBQ TIS 1ST 20SQCM/<: CPT | Mod: XU | Performed by: REGISTERED NURSE

## 2024-01-01 PROCEDURE — 250N000009 HC RX 250: Performed by: ANESTHESIOLOGY

## 2024-01-01 PROCEDURE — 255N000002 HC RX 255 OP 636: Performed by: INTERNAL MEDICINE

## 2024-01-01 PROCEDURE — 99207 PR NON-BILLABLE SERV PER CHARTING: CPT

## 2024-01-01 PROCEDURE — 360N000075 HC SURGERY LEVEL 2, PER MIN: Performed by: PODIATRIST

## 2024-01-01 PROCEDURE — 36415 COLL VENOUS BLD VENIPUNCTURE: CPT | Performed by: INTERNAL MEDICINE

## 2024-01-01 PROCEDURE — 83036 HEMOGLOBIN GLYCOSYLATED A1C: CPT | Performed by: INTERNAL MEDICINE

## 2024-01-01 PROCEDURE — 11042 DBRDMT SUBQ TIS 1ST 20SQCM/<: CPT | Performed by: REGISTERED NURSE

## 2024-01-01 PROCEDURE — 258N000003 HC RX IP 258 OP 636: Performed by: NURSE ANESTHETIST, CERTIFIED REGISTERED

## 2024-01-01 PROCEDURE — 88311 DECALCIFY TISSUE: CPT | Mod: 26 | Performed by: PATHOLOGY

## 2024-01-01 PROCEDURE — 99231 SBSQ HOSP IP/OBS SF/LOW 25: CPT | Performed by: PODIATRIST

## 2024-01-01 PROCEDURE — 99254 IP/OBS CNSLTJ NEW/EST MOD 60: CPT | Performed by: SPECIALIST

## 2024-01-01 PROCEDURE — 11055 PARING/CUTG B9 HYPRKER LES 1: CPT | Performed by: REGISTERED NURSE

## 2024-01-01 PROCEDURE — 93922 UPR/L XTREMITY ART 2 LEVELS: CPT

## 2024-01-01 PROCEDURE — 83605 ASSAY OF LACTIC ACID: CPT | Performed by: EMERGENCY MEDICINE

## 2024-01-01 PROCEDURE — 36415 COLL VENOUS BLD VENIPUNCTURE: CPT | Performed by: HOSPITALIST

## 2024-01-01 PROCEDURE — 87186 SC STD MICRODIL/AGAR DIL: CPT | Performed by: PODIATRIST

## 2024-01-01 PROCEDURE — 28010 INCISION OF TOE TENDON: CPT | Performed by: PODIATRIST

## 2024-01-01 PROCEDURE — G0463 HOSPITAL OUTPT CLINIC VISIT: HCPCS | Mod: 25

## 2024-01-01 PROCEDURE — 96375 TX/PRO/DX INJ NEW DRUG ADDON: CPT

## 2024-01-01 PROCEDURE — 258N000001 HC RX 258: Performed by: STUDENT IN AN ORGANIZED HEALTH CARE EDUCATION/TRAINING PROGRAM

## 2024-01-01 PROCEDURE — 999N000063 XR SHOULDER RIGHT PORT G/E 2 VIEWS: Mod: RT

## 2024-01-01 PROCEDURE — 370N000017 HC ANESTHESIA TECHNICAL FEE, PER MIN: Performed by: PODIATRIST

## 2024-01-01 PROCEDURE — 250N000009 HC RX 250: Performed by: STUDENT IN AN ORGANIZED HEALTH CARE EDUCATION/TRAINING PROGRAM

## 2024-01-01 PROCEDURE — 82040 ASSAY OF SERUM ALBUMIN: CPT | Performed by: EMERGENCY MEDICINE

## 2024-01-01 PROCEDURE — 360N000077 HC SURGERY LEVEL 4, PER MIN: Performed by: STUDENT IN AN ORGANIZED HEALTH CARE EDUCATION/TRAINING PROGRAM

## 2024-01-01 PROCEDURE — 28820 AMPUTATION OF TOE: CPT | Mod: T6 | Performed by: PODIATRIST

## 2024-01-01 DEVICE — HEALICOIL PK 5.5 MM SUTURE ANCHOR                                    WITH ONE ULTRATAPE SUTURE BLUE AND                                    ONE ULTRABRAID SUTURE NO.2
Type: IMPLANTABLE DEVICE | Site: SHOULDER | Status: FUNCTIONAL
Brand: HEALICOIL

## 2024-01-01 DEVICE — IMPLANTABLE DEVICE: Type: IMPLANTABLE DEVICE | Site: SHOULDER | Status: FUNCTIONAL

## 2024-01-01 DEVICE — IMPLANTABLE DEVICE
Type: IMPLANTABLE DEVICE | Site: SHOULDER | Status: FUNCTIONAL
Brand: ROTATION MEDICAL TENDON STAPLES

## 2024-01-01 DEVICE — HEALICOIL PK 5.5 MM SUTURE ANCHOR                                    WITH ONE ULTRATAPE SUTURE COBRAID                                    BLUE AND ONE ULTRABRAID SUTURE NO.2
Type: IMPLANTABLE DEVICE | Site: SHOULDER | Status: FUNCTIONAL
Brand: HEALICOIL

## 2024-01-01 RX ORDER — NALOXONE HYDROCHLORIDE 0.4 MG/ML
0.1 INJECTION, SOLUTION INTRAMUSCULAR; INTRAVENOUS; SUBCUTANEOUS
Status: DISCONTINUED | OUTPATIENT
Start: 2024-01-01 | End: 2024-01-01 | Stop reason: HOSPADM

## 2024-01-01 RX ORDER — ACETAMINOPHEN 325 MG/1
650 TABLET ORAL EVERY 4 HOURS PRN
Status: DISCONTINUED | OUTPATIENT
Start: 2024-01-01 | End: 2024-01-01

## 2024-01-01 RX ORDER — AMOXICILLIN 250 MG
1 CAPSULE ORAL 2 TIMES DAILY
Status: DISCONTINUED | OUTPATIENT
Start: 2024-01-01 | End: 2024-01-01 | Stop reason: HOSPADM

## 2024-01-01 RX ORDER — FENTANYL CITRATE 0.05 MG/ML
50 INJECTION, SOLUTION INTRAMUSCULAR; INTRAVENOUS EVERY 5 MIN PRN
Status: DISCONTINUED | OUTPATIENT
Start: 2024-01-01 | End: 2024-01-01 | Stop reason: HOSPADM

## 2024-01-01 RX ORDER — PANTOPRAZOLE SODIUM 40 MG/1
40 TABLET, DELAYED RELEASE ORAL DAILY
Status: DISCONTINUED | OUTPATIENT
Start: 2024-01-01 | End: 2024-01-01 | Stop reason: HOSPADM

## 2024-01-01 RX ORDER — HYDROMORPHONE HCL IN WATER/PF 6 MG/30 ML
0.2 PATIENT CONTROLLED ANALGESIA SYRINGE INTRAVENOUS EVERY 5 MIN PRN
Status: DISCONTINUED | OUTPATIENT
Start: 2024-01-01 | End: 2024-01-01 | Stop reason: HOSPADM

## 2024-01-01 RX ORDER — POLYETHYLENE GLYCOL 3350 17 G/17G
17 POWDER, FOR SOLUTION ORAL DAILY
Status: DISCONTINUED | OUTPATIENT
Start: 2024-01-01 | End: 2024-01-01 | Stop reason: HOSPADM

## 2024-01-01 RX ORDER — ACETAMINOPHEN 500 MG
500-1000 TABLET ORAL EVERY 6 HOURS PRN
Qty: 30 TABLET | Refills: 1 | Status: SHIPPED | OUTPATIENT
Start: 2024-01-01

## 2024-01-01 RX ORDER — MEPERIDINE HYDROCHLORIDE 25 MG/ML
25 INJECTION INTRAMUSCULAR; INTRAVENOUS; SUBCUTANEOUS EVERY 30 MIN PRN
OUTPATIENT
Start: 2024-01-01

## 2024-01-01 RX ORDER — GADOBUTROL 604.72 MG/ML
10 INJECTION INTRAVENOUS ONCE
Status: COMPLETED | OUTPATIENT
Start: 2024-01-01 | End: 2024-01-01

## 2024-01-01 RX ORDER — ALBUTEROL SULFATE 90 UG/1
1-2 AEROSOL, METERED RESPIRATORY (INHALATION)
Start: 2024-01-01

## 2024-01-01 RX ORDER — ARGININE/GLUTAMINE/CALCIUM BMB 7G-7G-1.5G
1 POWDER IN PACKET (EA) ORAL 2 TIMES DAILY
Status: DISCONTINUED | OUTPATIENT
Start: 2024-01-01 | End: 2024-01-01 | Stop reason: HOSPADM

## 2024-01-01 RX ORDER — CALCIUM CARBONATE 500 MG/1
1000 TABLET, CHEWABLE ORAL 4 TIMES DAILY PRN
Status: DISCONTINUED | OUTPATIENT
Start: 2024-01-01 | End: 2024-01-01 | Stop reason: HOSPADM

## 2024-01-01 RX ORDER — CEPHALEXIN 500 MG/1
500 CAPSULE ORAL 2 TIMES DAILY
Qty: 20 CAPSULE | Refills: 0 | Status: SHIPPED | OUTPATIENT
Start: 2024-01-01 | End: 2024-01-01

## 2024-01-01 RX ORDER — ONDANSETRON 4 MG/1
4 TABLET, ORALLY DISINTEGRATING ORAL EVERY 30 MIN PRN
Status: DISCONTINUED | OUTPATIENT
Start: 2024-01-01 | End: 2024-01-01 | Stop reason: HOSPADM

## 2024-01-01 RX ORDER — SODIUM CHLORIDE, SODIUM LACTATE, POTASSIUM CHLORIDE, CALCIUM CHLORIDE 600; 310; 30; 20 MG/100ML; MG/100ML; MG/100ML; MG/100ML
INJECTION, SOLUTION INTRAVENOUS CONTINUOUS
Status: DISCONTINUED | OUTPATIENT
Start: 2024-01-01 | End: 2024-01-01 | Stop reason: HOSPADM

## 2024-01-01 RX ORDER — ONDANSETRON 4 MG/1
4 TABLET, ORALLY DISINTEGRATING ORAL EVERY 8 HOURS PRN
Qty: 16 TABLET | Refills: 0 | Status: SHIPPED | OUTPATIENT
Start: 2024-01-01

## 2024-01-01 RX ORDER — DIPHENHYDRAMINE HYDROCHLORIDE 50 MG/ML
50 INJECTION INTRAMUSCULAR; INTRAVENOUS
Status: CANCELLED
Start: 2024-01-01

## 2024-01-01 RX ORDER — ONDANSETRON 2 MG/ML
4 INJECTION INTRAMUSCULAR; INTRAVENOUS EVERY 30 MIN PRN
Status: DISCONTINUED | OUTPATIENT
Start: 2024-01-01 | End: 2024-01-01 | Stop reason: HOSPADM

## 2024-01-01 RX ORDER — AMOXICILLIN 250 MG
1 CAPSULE ORAL 2 TIMES DAILY PRN
Status: DISCONTINUED | OUTPATIENT
Start: 2024-01-01 | End: 2024-01-01 | Stop reason: HOSPADM

## 2024-01-01 RX ORDER — ONDANSETRON 4 MG/1
4 TABLET, ORALLY DISINTEGRATING ORAL EVERY 6 HOURS PRN
Status: DISCONTINUED | OUTPATIENT
Start: 2024-01-01 | End: 2024-01-01

## 2024-01-01 RX ORDER — MAGNESIUM HYDROXIDE 1200 MG/15ML
LIQUID ORAL PRN
Status: DISCONTINUED | OUTPATIENT
Start: 2024-01-01 | End: 2024-01-01 | Stop reason: HOSPADM

## 2024-01-01 RX ORDER — HYDROMORPHONE HYDROCHLORIDE 1 MG/ML
0.5 INJECTION, SOLUTION INTRAMUSCULAR; INTRAVENOUS; SUBCUTANEOUS ONCE
Status: COMPLETED | OUTPATIENT
Start: 2024-01-01 | End: 2024-01-01

## 2024-01-01 RX ORDER — HEPARIN SODIUM,PORCINE 10 UNIT/ML
5-20 VIAL (ML) INTRAVENOUS DAILY PRN
Status: CANCELLED | OUTPATIENT
Start: 2024-01-01

## 2024-01-01 RX ORDER — MEPERIDINE HYDROCHLORIDE 25 MG/ML
25 INJECTION INTRAMUSCULAR; INTRAVENOUS; SUBCUTANEOUS EVERY 30 MIN PRN
Status: CANCELLED | OUTPATIENT
Start: 2024-01-01

## 2024-01-01 RX ORDER — DIPHENHYDRAMINE HYDROCHLORIDE 50 MG/ML
50 INJECTION INTRAMUSCULAR; INTRAVENOUS
Start: 2024-01-01

## 2024-01-01 RX ORDER — ALBUTEROL SULFATE 0.83 MG/ML
2.5 SOLUTION RESPIRATORY (INHALATION)
Status: CANCELLED | OUTPATIENT
Start: 2024-01-01

## 2024-01-01 RX ORDER — FENTANYL CITRATE 50 UG/ML
50 INJECTION, SOLUTION INTRAMUSCULAR; INTRAVENOUS ONCE
Status: COMPLETED | OUTPATIENT
Start: 2024-01-01 | End: 2024-01-01

## 2024-01-01 RX ORDER — BALSALAZIDE DISODIUM 750 MG/1
4500 CAPSULE ORAL DAILY
Status: DISCONTINUED | OUTPATIENT
Start: 2024-01-01 | End: 2024-01-01 | Stop reason: HOSPADM

## 2024-01-01 RX ORDER — ONDANSETRON 2 MG/ML
INJECTION INTRAMUSCULAR; INTRAVENOUS PRN
Status: DISCONTINUED | OUTPATIENT
Start: 2024-01-01 | End: 2024-01-01

## 2024-01-01 RX ORDER — GABAPENTIN 300 MG/1
300 CAPSULE ORAL 3 TIMES DAILY
Qty: 42 CAPSULE | Refills: 0 | Status: SHIPPED | OUTPATIENT
Start: 2024-01-01

## 2024-01-01 RX ORDER — PROCHLORPERAZINE MALEATE 10 MG
10 TABLET ORAL EVERY 6 HOURS PRN
Status: DISCONTINUED | OUTPATIENT
Start: 2024-01-01 | End: 2024-01-01 | Stop reason: HOSPADM

## 2024-01-01 RX ORDER — PROPOFOL 10 MG/ML
100 INJECTION, EMULSION INTRAVENOUS ONCE
Status: COMPLETED | OUTPATIENT
Start: 2024-01-01 | End: 2024-01-01

## 2024-01-01 RX ORDER — ACETAMINOPHEN 500 MG
500-1000 TABLET ORAL EVERY 6 HOURS PRN
Qty: 30 TABLET | Refills: 0 | Status: ON HOLD | OUTPATIENT
Start: 2024-01-01 | End: 2024-01-01

## 2024-01-01 RX ORDER — FERROUS SULFATE 325(65) MG
325 TABLET, DELAYED RELEASE (ENTERIC COATED) ORAL DAILY
COMMUNITY

## 2024-01-01 RX ORDER — DEXAMETHASONE SODIUM PHOSPHATE 4 MG/ML
INJECTION, SOLUTION INTRA-ARTICULAR; INTRALESIONAL; INTRAMUSCULAR; INTRAVENOUS; SOFT TISSUE PRN
Status: DISCONTINUED | OUTPATIENT
Start: 2024-01-01 | End: 2024-01-01

## 2024-01-01 RX ORDER — PROPOFOL 10 MG/ML
INJECTION, EMULSION INTRAVENOUS PRN
Status: DISCONTINUED | OUTPATIENT
Start: 2024-01-01 | End: 2024-01-01

## 2024-01-01 RX ORDER — ONDANSETRON 4 MG/1
4 TABLET, ORALLY DISINTEGRATING ORAL EVERY 6 HOURS PRN
Status: DISCONTINUED | OUTPATIENT
Start: 2024-01-01 | End: 2024-01-01 | Stop reason: HOSPADM

## 2024-01-01 RX ORDER — ONDANSETRON 2 MG/ML
4 INJECTION INTRAMUSCULAR; INTRAVENOUS EVERY 6 HOURS PRN
Status: DISCONTINUED | OUTPATIENT
Start: 2024-01-01 | End: 2024-01-01

## 2024-01-01 RX ORDER — LIDOCAINE HYDROCHLORIDE 20 MG/ML
INJECTION, SOLUTION INFILTRATION; PERINEURAL PRN
Status: DISCONTINUED | OUTPATIENT
Start: 2024-01-01 | End: 2024-01-01

## 2024-01-01 RX ORDER — HYDROMORPHONE HCL IN WATER/PF 6 MG/30 ML
0.4 PATIENT CONTROLLED ANALGESIA SYRINGE INTRAVENOUS EVERY 5 MIN PRN
Status: DISCONTINUED | OUTPATIENT
Start: 2024-01-01 | End: 2024-01-01 | Stop reason: HOSPADM

## 2024-01-01 RX ORDER — CEFAZOLIN SODIUM/WATER 2 G/20 ML
2 SYRINGE (ML) INTRAVENOUS
Status: COMPLETED | OUTPATIENT
Start: 2024-01-01 | End: 2024-01-01

## 2024-01-01 RX ORDER — METHYLPREDNISOLONE SODIUM SUCCINATE 125 MG/2ML
125 INJECTION, POWDER, LYOPHILIZED, FOR SOLUTION INTRAMUSCULAR; INTRAVENOUS
Status: CANCELLED
Start: 2024-01-01

## 2024-01-01 RX ORDER — HEPARIN SODIUM (PORCINE) LOCK FLUSH IV SOLN 100 UNIT/ML 100 UNIT/ML
5 SOLUTION INTRAVENOUS
Status: CANCELLED | OUTPATIENT
Start: 2024-01-01

## 2024-01-01 RX ORDER — PROCHLORPERAZINE 25 MG
25 SUPPOSITORY, RECTAL RECTAL EVERY 12 HOURS PRN
Status: DISCONTINUED | OUTPATIENT
Start: 2024-01-01 | End: 2024-01-01 | Stop reason: HOSPADM

## 2024-01-01 RX ORDER — LISINOPRIL 20 MG/1
20 TABLET ORAL DAILY
Status: DISCONTINUED | OUTPATIENT
Start: 2024-01-01 | End: 2024-01-01 | Stop reason: HOSPADM

## 2024-01-01 RX ORDER — CEFAZOLIN SODIUM/WATER 2 G/20 ML
2 SYRINGE (ML) INTRAVENOUS SEE ADMIN INSTRUCTIONS
Status: DISCONTINUED | OUTPATIENT
Start: 2024-01-01 | End: 2024-01-01 | Stop reason: HOSPADM

## 2024-01-01 RX ORDER — ALBUTEROL SULFATE 90 UG/1
1-2 AEROSOL, METERED RESPIRATORY (INHALATION)
Status: CANCELLED
Start: 2024-01-01

## 2024-01-01 RX ORDER — NICOTINE POLACRILEX 4 MG
15-30 LOZENGE BUCCAL
Status: DISCONTINUED | OUTPATIENT
Start: 2024-01-01 | End: 2024-01-01 | Stop reason: HOSPADM

## 2024-01-01 RX ORDER — DEXAMETHASONE SODIUM PHOSPHATE 4 MG/ML
4 INJECTION, SOLUTION INTRA-ARTICULAR; INTRALESIONAL; INTRAMUSCULAR; INTRAVENOUS; SOFT TISSUE
Status: DISCONTINUED | OUTPATIENT
Start: 2024-01-01 | End: 2024-01-01 | Stop reason: HOSPADM

## 2024-01-01 RX ORDER — HEPARIN SODIUM,PORCINE 10 UNIT/ML
5-20 VIAL (ML) INTRAVENOUS DAILY PRN
OUTPATIENT
Start: 2024-01-01

## 2024-01-01 RX ORDER — AMOXICILLIN 250 MG
2 CAPSULE ORAL 2 TIMES DAILY PRN
Status: DISCONTINUED | OUTPATIENT
Start: 2024-01-01 | End: 2024-01-01 | Stop reason: HOSPADM

## 2024-01-01 RX ORDER — FENTANYL CITRATE 50 UG/ML
50 INJECTION, SOLUTION INTRAMUSCULAR; INTRAVENOUS EVERY 5 MIN PRN
Status: DISCONTINUED | OUTPATIENT
Start: 2024-01-01 | End: 2024-01-01 | Stop reason: HOSPADM

## 2024-01-01 RX ORDER — ACETAMINOPHEN 325 MG/1
975 TABLET ORAL ONCE
Status: DISCONTINUED | OUTPATIENT
Start: 2024-01-01 | End: 2024-01-01 | Stop reason: HOSPADM

## 2024-01-01 RX ORDER — FUROSEMIDE 20 MG
20 TABLET ORAL DAILY
Status: DISCONTINUED | OUTPATIENT
Start: 2024-01-01 | End: 2024-01-01 | Stop reason: HOSPADM

## 2024-01-01 RX ORDER — FENTANYL CITRATE 50 UG/ML
INJECTION, SOLUTION INTRAMUSCULAR; INTRAVENOUS PRN
Status: DISCONTINUED | OUTPATIENT
Start: 2024-01-01 | End: 2024-01-01

## 2024-01-01 RX ORDER — FERROUS SULFATE 325(65) MG
325 TABLET, DELAYED RELEASE (ENTERIC COATED) ORAL DAILY
COMMUNITY
End: 2024-01-01

## 2024-01-01 RX ORDER — METRONIDAZOLE 500 MG/100ML
500 INJECTION, SOLUTION INTRAVENOUS EVERY 12 HOURS
Status: DISCONTINUED | OUTPATIENT
Start: 2024-01-01 | End: 2024-01-01

## 2024-01-01 RX ORDER — BUPIVACAINE HYDROCHLORIDE 5 MG/ML
INJECTION, SOLUTION EPIDURAL; INTRACAUDAL
Status: DISCONTINUED
Start: 2024-01-01 | End: 2024-01-01 | Stop reason: HOSPADM

## 2024-01-01 RX ORDER — CEPHALEXIN 500 MG/1
500 CAPSULE ORAL 4 TIMES DAILY
Qty: 8 CAPSULE | Refills: 0 | Status: SHIPPED | OUTPATIENT
Start: 2024-01-01

## 2024-01-01 RX ORDER — POTASSIUM CHLORIDE 750 MG/1
10 TABLET, EXTENDED RELEASE ORAL DAILY
Status: DISCONTINUED | OUTPATIENT
Start: 2024-01-01 | End: 2024-01-01 | Stop reason: HOSPADM

## 2024-01-01 RX ORDER — METRONIDAZOLE 500 MG/100ML
500 INJECTION, SOLUTION INTRAVENOUS ONCE
Status: COMPLETED | OUTPATIENT
Start: 2024-01-01 | End: 2024-01-01

## 2024-01-01 RX ORDER — TRANEXAMIC ACID 10 MG/ML
1 INJECTION, SOLUTION INTRAVENOUS ONCE
Status: COMPLETED | OUTPATIENT
Start: 2024-01-01 | End: 2024-01-01

## 2024-01-01 RX ORDER — BISACODYL 10 MG
10 SUPPOSITORY, RECTAL RECTAL DAILY PRN
Status: DISCONTINUED | OUTPATIENT
Start: 2024-01-01 | End: 2024-01-01 | Stop reason: HOSPADM

## 2024-01-01 RX ORDER — METHYLPREDNISOLONE SODIUM SUCCINATE 125 MG/2ML
125 INJECTION, POWDER, LYOPHILIZED, FOR SOLUTION INTRAMUSCULAR; INTRAVENOUS
Start: 2024-01-01

## 2024-01-01 RX ORDER — HYDROMORPHONE HYDROCHLORIDE 1 MG/ML
0.5 INJECTION, SOLUTION INTRAMUSCULAR; INTRAVENOUS; SUBCUTANEOUS EVERY 30 MIN PRN
Status: DISCONTINUED | OUTPATIENT
Start: 2024-01-01 | End: 2024-01-01 | Stop reason: HOSPADM

## 2024-01-01 RX ORDER — OXYCODONE HYDROCHLORIDE 5 MG/1
10 TABLET ORAL
Status: DISCONTINUED | OUTPATIENT
Start: 2024-01-01 | End: 2024-01-01 | Stop reason: HOSPADM

## 2024-01-01 RX ORDER — ACETAMINOPHEN 325 MG/1
650 TABLET ORAL EVERY 4 HOURS PRN
Status: DISCONTINUED | OUTPATIENT
Start: 2024-01-01 | End: 2024-01-01 | Stop reason: HOSPADM

## 2024-01-01 RX ORDER — BUPIVACAINE HYDROCHLORIDE AND EPINEPHRINE 5; 5 MG/ML; UG/ML
INJECTION, SOLUTION PERINEURAL
Status: COMPLETED | OUTPATIENT
Start: 2024-01-01 | End: 2024-01-01

## 2024-01-01 RX ORDER — METOPROLOL SUCCINATE 25 MG/1
25 TABLET, EXTENDED RELEASE ORAL DAILY
Status: DISCONTINUED | OUTPATIENT
Start: 2024-01-01 | End: 2024-01-01 | Stop reason: HOSPADM

## 2024-01-01 RX ORDER — ALBUTEROL SULFATE 0.83 MG/ML
2.5 SOLUTION RESPIRATORY (INHALATION)
OUTPATIENT
Start: 2024-01-01

## 2024-01-01 RX ORDER — LIDOCAINE 40 MG/G
CREAM TOPICAL
Status: DISCONTINUED | OUTPATIENT
Start: 2024-01-01 | End: 2024-01-01 | Stop reason: HOSPADM

## 2024-01-01 RX ORDER — FOLIC ACID 1 MG/1
1 TABLET ORAL 2 TIMES DAILY
Status: DISCONTINUED | OUTPATIENT
Start: 2024-01-01 | End: 2024-01-01 | Stop reason: HOSPADM

## 2024-01-01 RX ORDER — ALLOPURINOL 100 MG/1
200 TABLET ORAL DAILY
Status: DISCONTINUED | OUTPATIENT
Start: 2024-01-01 | End: 2024-01-01 | Stop reason: HOSPADM

## 2024-01-01 RX ORDER — PROCHLORPERAZINE MALEATE 10 MG
10 TABLET ORAL EVERY 6 HOURS PRN
Status: DISCONTINUED | OUTPATIENT
Start: 2024-01-01 | End: 2024-01-01

## 2024-01-01 RX ORDER — OXYCODONE HYDROCHLORIDE 5 MG/1
5 TABLET ORAL 4 TIMES DAILY PRN
Qty: 12 TABLET | Refills: 0 | Status: SHIPPED | OUTPATIENT
Start: 2024-01-01 | End: 2024-01-01

## 2024-01-01 RX ORDER — CYANOCOBALAMIN 1000 UG/ML
1 INJECTION, SOLUTION INTRAMUSCULAR; SUBCUTANEOUS
COMMUNITY

## 2024-01-01 RX ORDER — HEPARIN SODIUM (PORCINE) LOCK FLUSH IV SOLN 100 UNIT/ML 100 UNIT/ML
5 SOLUTION INTRAVENOUS
OUTPATIENT
Start: 2024-01-01

## 2024-01-01 RX ORDER — LOPERAMIDE HCL 2 MG
2 CAPSULE ORAL 4 TIMES DAILY PRN
Status: DISCONTINUED | OUTPATIENT
Start: 2024-01-01 | End: 2024-01-01 | Stop reason: HOSPADM

## 2024-01-01 RX ORDER — HYDRALAZINE HYDROCHLORIDE 20 MG/ML
2.5-5 INJECTION INTRAMUSCULAR; INTRAVENOUS EVERY 10 MIN PRN
Status: DISCONTINUED | OUTPATIENT
Start: 2024-01-01 | End: 2024-01-01 | Stop reason: HOSPADM

## 2024-01-01 RX ORDER — DEXTROSE MONOHYDRATE 25 G/50ML
25-50 INJECTION, SOLUTION INTRAVENOUS
Status: DISCONTINUED | OUTPATIENT
Start: 2024-01-01 | End: 2024-01-01 | Stop reason: HOSPADM

## 2024-01-01 RX ORDER — FENTANYL CITRATE 50 UG/ML
25 INJECTION, SOLUTION INTRAMUSCULAR; INTRAVENOUS EVERY 5 MIN PRN
Status: DISCONTINUED | OUTPATIENT
Start: 2024-01-01 | End: 2024-01-01 | Stop reason: HOSPADM

## 2024-01-01 RX ORDER — PIOGLITAZONEHYDROCHLORIDE 15 MG/1
15 TABLET ORAL DAILY
COMMUNITY

## 2024-01-01 RX ORDER — METHYLPREDNISOLONE SODIUM SUCCINATE 125 MG/2ML
125 INJECTION, POWDER, LYOPHILIZED, FOR SOLUTION INTRAMUSCULAR; INTRAVENOUS
Status: DISCONTINUED | OUTPATIENT
Start: 2024-01-01 | End: 2024-01-01 | Stop reason: HOSPADM

## 2024-01-01 RX ORDER — LIDOCAINE 40 MG/G
CREAM TOPICAL
Status: DISCONTINUED | OUTPATIENT
Start: 2024-01-01 | End: 2024-01-01

## 2024-01-01 RX ORDER — SODIUM CHLORIDE, SODIUM LACTATE, POTASSIUM CHLORIDE, CALCIUM CHLORIDE 600; 310; 30; 20 MG/100ML; MG/100ML; MG/100ML; MG/100ML
INJECTION, SOLUTION INTRAVENOUS CONTINUOUS PRN
Status: DISCONTINUED | OUTPATIENT
Start: 2024-01-01 | End: 2024-01-01

## 2024-01-01 RX ORDER — PIOGLITAZONEHYDROCHLORIDE 30 MG/1
30 TABLET ORAL DAILY
Status: DISCONTINUED | OUTPATIENT
Start: 2024-01-01 | End: 2024-01-01 | Stop reason: HOSPADM

## 2024-01-01 RX ORDER — ACETAMINOPHEN 650 MG/1
650 SUPPOSITORY RECTAL EVERY 4 HOURS PRN
Status: DISCONTINUED | OUTPATIENT
Start: 2024-01-01 | End: 2024-01-01 | Stop reason: HOSPADM

## 2024-01-01 RX ORDER — ASPIRIN 81 MG/1
81 TABLET ORAL DAILY
Qty: 60 TABLET | Refills: 0 | Status: SHIPPED | OUTPATIENT
Start: 2024-01-01 | End: 2024-01-01

## 2024-01-01 RX ORDER — DIPHENHYDRAMINE HYDROCHLORIDE 50 MG/ML
50 INJECTION INTRAMUSCULAR; INTRAVENOUS
Status: DISCONTINUED | OUTPATIENT
Start: 2024-01-01 | End: 2024-01-01 | Stop reason: HOSPADM

## 2024-01-01 RX ORDER — BUPIVACAINE HYDROCHLORIDE 5 MG/ML
INJECTION, SOLUTION EPIDURAL; INTRACAUDAL PRN
Status: DISCONTINUED | OUTPATIENT
Start: 2024-01-01 | End: 2024-01-01 | Stop reason: HOSPADM

## 2024-01-01 RX ORDER — CEFEPIME HYDROCHLORIDE 2 G/1
2 INJECTION, POWDER, FOR SOLUTION INTRAVENOUS EVERY 8 HOURS
Status: DISCONTINUED | OUTPATIENT
Start: 2024-01-01 | End: 2024-01-01

## 2024-01-01 RX ORDER — OXYCODONE HYDROCHLORIDE 5 MG/1
5 TABLET ORAL EVERY 6 HOURS PRN
Qty: 12 TABLET | Refills: 0 | Status: SHIPPED | OUTPATIENT
Start: 2024-01-01 | End: 2024-01-01

## 2024-01-01 RX ORDER — ACETAMINOPHEN 325 MG/1
975 TABLET ORAL EVERY 8 HOURS
Qty: 27 TABLET | Refills: 0 | Status: DISCONTINUED | OUTPATIENT
Start: 2024-01-01 | End: 2024-01-01 | Stop reason: HOSPADM

## 2024-01-01 RX ORDER — EPINEPHRINE 1 MG/ML
0.3 INJECTION, SOLUTION INTRAMUSCULAR; SUBCUTANEOUS EVERY 5 MIN PRN
Status: CANCELLED | OUTPATIENT
Start: 2024-01-01

## 2024-01-01 RX ORDER — FENTANYL CITRATE 0.05 MG/ML
25 INJECTION, SOLUTION INTRAMUSCULAR; INTRAVENOUS EVERY 5 MIN PRN
Status: DISCONTINUED | OUTPATIENT
Start: 2024-01-01 | End: 2024-01-01 | Stop reason: HOSPADM

## 2024-01-01 RX ORDER — CALCIUM CARBONATE 750 MG/1
750 TABLET, CHEWABLE ORAL DAILY PRN
Status: DISCONTINUED | OUTPATIENT
Start: 2024-01-01 | End: 2024-01-01

## 2024-01-01 RX ORDER — FOLIC ACID 1 MG/1
1 TABLET ORAL
COMMUNITY
Start: 2024-01-01

## 2024-01-01 RX ORDER — FLUMAZENIL 0.1 MG/ML
0.2 INJECTION, SOLUTION INTRAVENOUS
Status: DISCONTINUED | OUTPATIENT
Start: 2024-01-01 | End: 2024-01-01 | Stop reason: HOSPADM

## 2024-01-01 RX ORDER — POLYETHYLENE GLYCOL 3350 17 G/17G
17 POWDER, FOR SOLUTION ORAL 2 TIMES DAILY PRN
Status: DISCONTINUED | OUTPATIENT
Start: 2024-01-01 | End: 2024-01-01 | Stop reason: HOSPADM

## 2024-01-01 RX ORDER — PROPOFOL 10 MG/ML
INJECTION, EMULSION INTRAVENOUS CONTINUOUS PRN
Status: DISCONTINUED | OUTPATIENT
Start: 2024-01-01 | End: 2024-01-01

## 2024-01-01 RX ORDER — ONDANSETRON 2 MG/ML
4 INJECTION INTRAMUSCULAR; INTRAVENOUS EVERY 6 HOURS PRN
Status: DISCONTINUED | OUTPATIENT
Start: 2024-01-01 | End: 2024-01-01 | Stop reason: HOSPADM

## 2024-01-01 RX ORDER — EPINEPHRINE 1 MG/ML
0.3 INJECTION, SOLUTION, CONCENTRATE INTRAVENOUS EVERY 5 MIN PRN
Status: CANCELLED | OUTPATIENT
Start: 2024-01-01

## 2024-01-01 RX ORDER — LABETALOL HYDROCHLORIDE 5 MG/ML
10 INJECTION, SOLUTION INTRAVENOUS
Status: DISCONTINUED | OUTPATIENT
Start: 2024-01-01 | End: 2024-01-01 | Stop reason: HOSPADM

## 2024-01-01 RX ORDER — OXYCODONE HYDROCHLORIDE 5 MG/1
5 TABLET ORAL
Status: DISCONTINUED | OUTPATIENT
Start: 2024-01-01 | End: 2024-01-01 | Stop reason: HOSPADM

## 2024-01-01 RX ORDER — CEFEPIME HYDROCHLORIDE 2 G/1
2 INJECTION, POWDER, FOR SOLUTION INTRAVENOUS ONCE
Status: COMPLETED | OUTPATIENT
Start: 2024-01-01 | End: 2024-01-01

## 2024-01-01 RX ORDER — EPINEPHRINE 1 MG/ML
0.3 INJECTION, SOLUTION INTRAMUSCULAR; SUBCUTANEOUS EVERY 5 MIN PRN
OUTPATIENT
Start: 2024-01-01

## 2024-01-01 RX ORDER — DEXAMETHASONE SODIUM PHOSPHATE 4 MG/ML
4 INJECTION, SOLUTION INTRA-ARTICULAR; INTRALESIONAL; INTRAMUSCULAR; INTRAVENOUS; SOFT TISSUE
Status: COMPLETED | OUTPATIENT
Start: 2024-01-01 | End: 2024-01-01

## 2024-01-01 RX ORDER — PROPOFOL 10 MG/ML
30-50 INJECTION, EMULSION INTRAVENOUS
Status: DISCONTINUED | OUTPATIENT
Start: 2024-01-01 | End: 2024-01-01 | Stop reason: HOSPADM

## 2024-01-01 RX ORDER — OXYCODONE HYDROCHLORIDE 5 MG/1
5 TABLET ORAL EVERY 6 HOURS PRN
Qty: 20 TABLET | Refills: 0 | Status: SHIPPED | OUTPATIENT
Start: 2024-01-01 | End: 2024-01-01

## 2024-01-01 RX ORDER — FERROUS SULFATE 325(65) MG
325 TABLET ORAL DAILY
Status: DISCONTINUED | OUTPATIENT
Start: 2024-01-01 | End: 2024-01-01 | Stop reason: HOSPADM

## 2024-01-01 RX ORDER — DOXYCYCLINE 100 MG/1
100 CAPSULE ORAL 2 TIMES DAILY
Qty: 28 CAPSULE | Refills: 0 | Status: SHIPPED | OUTPATIENT
Start: 2024-01-01 | End: 2024-01-01

## 2024-01-01 RX ORDER — ALBUTEROL SULFATE 0.83 MG/ML
2.5 SOLUTION RESPIRATORY (INHALATION) EVERY 4 HOURS PRN
Status: DISCONTINUED | OUTPATIENT
Start: 2024-01-01 | End: 2024-01-01 | Stop reason: HOSPADM

## 2024-01-01 RX ORDER — SENNA AND DOCUSATE SODIUM 50; 8.6 MG/1; MG/1
1 TABLET, FILM COATED ORAL AT BEDTIME
Qty: 20 TABLET | Refills: 0 | Status: SHIPPED | OUTPATIENT
Start: 2024-01-01

## 2024-01-01 RX ORDER — IBUPROFEN 800 MG/1
800 TABLET, FILM COATED ORAL EVERY 8 HOURS PRN
Qty: 56 TABLET | Refills: 0 | Status: SHIPPED | OUTPATIENT
Start: 2024-01-01 | End: 2024-01-01

## 2024-01-01 RX ORDER — EMPAGLIFLOZIN 25 MG/1
1 TABLET, FILM COATED ORAL
COMMUNITY
Start: 2024-01-01

## 2024-01-01 RX ADMIN — Medication 1 PACKET: at 10:21

## 2024-01-01 RX ADMIN — METOPROLOL SUCCINATE 25 MG: 25 TABLET, EXTENDED RELEASE ORAL at 10:45

## 2024-01-01 RX ADMIN — ROCURONIUM BROMIDE 100 MG: 50 INJECTION, SOLUTION INTRAVENOUS at 10:50

## 2024-01-01 RX ADMIN — LISINOPRIL 20 MG: 20 TABLET ORAL at 10:45

## 2024-01-01 RX ADMIN — ROCURONIUM BROMIDE 10 MG: 50 INJECTION, SOLUTION INTRAVENOUS at 13:57

## 2024-01-01 RX ADMIN — SODIUM CHLORIDE, POTASSIUM CHLORIDE, SODIUM LACTATE AND CALCIUM CHLORIDE: 600; 310; 30; 20 INJECTION, SOLUTION INTRAVENOUS at 20:35

## 2024-01-01 RX ADMIN — DEXAMETHASONE SODIUM PHOSPHATE 4 MG: 4 INJECTION, SOLUTION INTRA-ARTICULAR; INTRALESIONAL; INTRAMUSCULAR; INTRAVENOUS; SOFT TISSUE at 20:40

## 2024-01-01 RX ADMIN — POTASSIUM CHLORIDE 10 MEQ: 750 TABLET, EXTENDED RELEASE ORAL at 08:33

## 2024-01-01 RX ADMIN — ROCURONIUM BROMIDE 20 MG: 50 INJECTION, SOLUTION INTRAVENOUS at 13:11

## 2024-01-01 RX ADMIN — METRONIDAZOLE 500 MG: 500 INJECTION, SOLUTION INTRAVENOUS at 19:07

## 2024-01-01 RX ADMIN — PHENYLEPHRINE HYDROCHLORIDE 0.5 MCG/KG/MIN: 10 INJECTION INTRAVENOUS at 11:50

## 2024-01-01 RX ADMIN — FENTANYL CITRATE 100 MCG: 50 INJECTION INTRAMUSCULAR; INTRAVENOUS at 20:40

## 2024-01-01 RX ADMIN — FERRIC CARBOXYMALTOSE INJECTION 750 MG: 50 INJECTION, SOLUTION INTRAVENOUS at 08:31

## 2024-01-01 RX ADMIN — LISINOPRIL 20 MG: 20 TABLET ORAL at 08:16

## 2024-01-01 RX ADMIN — FENTANYL CITRATE 50 MCG: 50 INJECTION, SOLUTION INTRAMUSCULAR; INTRAVENOUS at 15:59

## 2024-01-01 RX ADMIN — PANTOPRAZOLE SODIUM 40 MG: 40 TABLET, DELAYED RELEASE ORAL at 10:46

## 2024-01-01 RX ADMIN — SERTRALINE HYDROCHLORIDE 150 MG: 50 TABLET ORAL at 08:35

## 2024-01-01 RX ADMIN — TRANEXAMIC ACID 1 G: 10 INJECTION, SOLUTION INTRAVENOUS at 12:32

## 2024-01-01 RX ADMIN — LISINOPRIL 20 MG: 20 TABLET ORAL at 08:36

## 2024-01-01 RX ADMIN — LIDOCAINE HYDROCHLORIDE 50 MG: 20 INJECTION, SOLUTION INFILTRATION; PERINEURAL at 20:40

## 2024-01-01 RX ADMIN — APIXABAN 5 MG: 5 TABLET, FILM COATED ORAL at 20:48

## 2024-01-01 RX ADMIN — CEFEPIME 2 G: 2 INJECTION, POWDER, FOR SOLUTION INTRAVENOUS at 18:02

## 2024-01-01 RX ADMIN — FOLIC ACID 1 MG: 1 TABLET ORAL at 08:34

## 2024-01-01 RX ADMIN — ROCURONIUM BROMIDE 20 MG: 50 INJECTION, SOLUTION INTRAVENOUS at 14:40

## 2024-01-01 RX ADMIN — FENTANYL CITRATE 25 MCG: 50 INJECTION INTRAMUSCULAR; INTRAVENOUS at 07:33

## 2024-01-01 RX ADMIN — FERROUS SULFATE TAB 325 MG (65 MG ELEMENTAL FE) 325 MG: 325 (65 FE) TAB at 08:34

## 2024-01-01 RX ADMIN — Medication 1 PACKET: at 15:51

## 2024-01-01 RX ADMIN — METOPROLOL SUCCINATE 25 MG: 25 TABLET, EXTENDED RELEASE ORAL at 08:35

## 2024-01-01 RX ADMIN — FOLIC ACID 1 MG: 1 TABLET ORAL at 21:24

## 2024-01-01 RX ADMIN — ACETAMINOPHEN: 500 TABLET, FILM COATED ORAL at 21:37

## 2024-01-01 RX ADMIN — GADOBUTROL 10 ML: 604.72 INJECTION INTRAVENOUS at 01:31

## 2024-01-01 RX ADMIN — BALSALAZIDE DISODIUM 4500 MG: 750 CAPSULE ORAL at 08:33

## 2024-01-01 RX ADMIN — FUROSEMIDE 20 MG: 20 TABLET ORAL at 08:35

## 2024-01-01 RX ADMIN — PROPOFOL 100 MG: 10 INJECTION, EMULSION INTRAVENOUS at 13:53

## 2024-01-01 RX ADMIN — HYDROMORPHONE HYDROCHLORIDE 0.5 MG: 1 INJECTION, SOLUTION INTRAMUSCULAR; INTRAVENOUS; SUBCUTANEOUS at 12:33

## 2024-01-01 RX ADMIN — PROPOFOL 30 MG: 10 INJECTION, EMULSION INTRAVENOUS at 13:54

## 2024-01-01 RX ADMIN — CEFEPIME 2 G: 2 INJECTION, POWDER, FOR SOLUTION INTRAVENOUS at 02:36

## 2024-01-01 RX ADMIN — MIDAZOLAM 2 MG: 1 INJECTION INTRAMUSCULAR; INTRAVENOUS at 10:44

## 2024-01-01 RX ADMIN — CEFEPIME 2 G: 2 INJECTION, POWDER, FOR SOLUTION INTRAVENOUS at 02:16

## 2024-01-01 RX ADMIN — SERTRALINE HYDROCHLORIDE 150 MG: 50 TABLET ORAL at 08:33

## 2024-01-01 RX ADMIN — PANTOPRAZOLE SODIUM 40 MG: 40 TABLET, DELAYED RELEASE ORAL at 08:16

## 2024-01-01 RX ADMIN — SODIUM CHLORIDE, POTASSIUM CHLORIDE, SODIUM LACTATE AND CALCIUM CHLORIDE: 600; 310; 30; 20 INJECTION, SOLUTION INTRAVENOUS at 10:21

## 2024-01-01 RX ADMIN — SODIUM CHLORIDE, POTASSIUM CHLORIDE, SODIUM LACTATE AND CALCIUM CHLORIDE: 600; 310; 30; 20 INJECTION, SOLUTION INTRAVENOUS at 13:48

## 2024-01-01 RX ADMIN — BALSALAZIDE DISODIUM 4500 MG: 750 CAPSULE ORAL at 10:44

## 2024-01-01 RX ADMIN — EMPAGLIFLOZIN 25 MG: 25 TABLET, FILM COATED ORAL at 08:16

## 2024-01-01 RX ADMIN — ONDANSETRON 4 MG: 2 INJECTION INTRAMUSCULAR; INTRAVENOUS at 15:11

## 2024-01-01 RX ADMIN — PROPOFOL 170 MG: 10 INJECTION, EMULSION INTRAVENOUS at 10:50

## 2024-01-01 RX ADMIN — ACETAMINOPHEN 975 MG: 325 TABLET, FILM COATED ORAL at 17:31

## 2024-01-01 RX ADMIN — Medication 1 PACKET: at 12:11

## 2024-01-01 RX ADMIN — BALSALAZIDE DISODIUM 4500 MG: 750 CAPSULE ORAL at 08:15

## 2024-01-01 RX ADMIN — FOLIC ACID 1 MG: 1 TABLET ORAL at 08:16

## 2024-01-01 RX ADMIN — APIXABAN 5 MG: 5 TABLET, FILM COATED ORAL at 08:15

## 2024-01-01 RX ADMIN — AMOXICILLIN AND CLAVULANATE POTASSIUM 1 TABLET: 875; 125 TABLET, FILM COATED ORAL at 13:56

## 2024-01-01 RX ADMIN — TRANEXAMIC ACID 1 G: 10 INJECTION, SOLUTION INTRAVENOUS at 11:00

## 2024-01-01 RX ADMIN — PANTOPRAZOLE SODIUM 40 MG: 40 TABLET, DELAYED RELEASE ORAL at 08:35

## 2024-01-01 RX ADMIN — PROPOFOL 30 MG: 10 INJECTION, EMULSION INTRAVENOUS at 14:03

## 2024-01-01 RX ADMIN — PROPOFOL 150 MCG/KG/MIN: 10 INJECTION, EMULSION INTRAVENOUS at 07:33

## 2024-01-01 RX ADMIN — Medication 100 MG: at 20:40

## 2024-01-01 RX ADMIN — FENTANYL CITRATE 50 MCG: 50 INJECTION, SOLUTION INTRAMUSCULAR; INTRAVENOUS at 20:07

## 2024-01-01 RX ADMIN — SUGAMMADEX 200 MG: 100 INJECTION, SOLUTION INTRAVENOUS at 15:22

## 2024-01-01 RX ADMIN — LIDOCAINE HYDROCHLORIDE 60 MG: 20 INJECTION, SOLUTION INFILTRATION; PERINEURAL at 07:33

## 2024-01-01 RX ADMIN — PANTOPRAZOLE SODIUM 40 MG: 40 TABLET, DELAYED RELEASE ORAL at 08:34

## 2024-01-01 RX ADMIN — ONDANSETRON 4 MG: 2 INJECTION INTRAMUSCULAR; INTRAVENOUS at 20:47

## 2024-01-01 RX ADMIN — FOLIC ACID 1 MG: 1 TABLET ORAL at 21:39

## 2024-01-01 RX ADMIN — ALLOPURINOL 200 MG: 100 TABLET ORAL at 10:43

## 2024-01-01 RX ADMIN — EMPAGLIFLOZIN 25 MG: 25 TABLET, FILM COATED ORAL at 08:34

## 2024-01-01 RX ADMIN — SODIUM CHLORIDE 250 ML: 9 INJECTION, SOLUTION INTRAVENOUS at 08:24

## 2024-01-01 RX ADMIN — FUROSEMIDE 20 MG: 20 TABLET ORAL at 08:16

## 2024-01-01 RX ADMIN — CEFEPIME 2 G: 2 INJECTION, POWDER, FOR SOLUTION INTRAVENOUS at 09:54

## 2024-01-01 RX ADMIN — FENTANYL CITRATE 50 MCG: 50 INJECTION INTRAMUSCULAR; INTRAVENOUS at 15:24

## 2024-01-01 RX ADMIN — IRON SUCROSE 300 MG: 20 INJECTION, SOLUTION INTRAVENOUS at 13:33

## 2024-01-01 RX ADMIN — LIDOCAINE HYDROCHLORIDE 50 MG: 20 INJECTION, SOLUTION INFILTRATION; PERINEURAL at 10:50

## 2024-01-01 RX ADMIN — IRON SUCROSE 300 MG: 20 INJECTION, SOLUTION INTRAVENOUS at 15:43

## 2024-01-01 RX ADMIN — LISINOPRIL 20 MG: 20 TABLET ORAL at 08:34

## 2024-01-01 RX ADMIN — CEFEPIME 2 G: 2 INJECTION, POWDER, FOR SOLUTION INTRAVENOUS at 10:21

## 2024-01-01 RX ADMIN — FENTANYL CITRATE 50 MCG: 50 INJECTION INTRAMUSCULAR; INTRAVENOUS at 15:27

## 2024-01-01 RX ADMIN — BALSALAZIDE DISODIUM 4500 MG: 750 CAPSULE ORAL at 08:35

## 2024-01-01 RX ADMIN — MIDAZOLAM 1 MG: 1 INJECTION INTRAMUSCULAR; INTRAVENOUS at 07:30

## 2024-01-01 RX ADMIN — HYDROMORPHONE HYDROCHLORIDE 0.5 MG: 1 INJECTION, SOLUTION INTRAMUSCULAR; INTRAVENOUS; SUBCUTANEOUS at 17:42

## 2024-01-01 RX ADMIN — ONDANSETRON 4 MG: 2 INJECTION INTRAMUSCULAR; INTRAVENOUS at 07:38

## 2024-01-01 RX ADMIN — PROPOFOL 30 MG: 10 INJECTION, EMULSION INTRAVENOUS at 14:00

## 2024-01-01 RX ADMIN — POTASSIUM CHLORIDE 10 MEQ: 750 TABLET, EXTENDED RELEASE ORAL at 08:15

## 2024-01-01 RX ADMIN — ACETAMINOPHEN 975 MG: 325 TABLET, FILM COATED ORAL at 10:21

## 2024-01-01 RX ADMIN — CEFEPIME 2 G: 2 INJECTION, POWDER, FOR SOLUTION INTRAVENOUS at 10:37

## 2024-01-01 RX ADMIN — EMPAGLIFLOZIN 25 MG: 25 TABLET, FILM COATED ORAL at 15:43

## 2024-01-01 RX ADMIN — FENTANYL CITRATE 100 MCG: 50 INJECTION INTRAMUSCULAR; INTRAVENOUS at 10:50

## 2024-01-01 RX ADMIN — FENTANYL CITRATE 50 MCG: 50 INJECTION, SOLUTION INTRAMUSCULAR; INTRAVENOUS at 20:25

## 2024-01-01 RX ADMIN — POTASSIUM CHLORIDE 10 MEQ: 750 TABLET, EXTENDED RELEASE ORAL at 10:46

## 2024-01-01 RX ADMIN — SODIUM CHLORIDE 250 ML: 9 INJECTION, SOLUTION INTRAVENOUS at 13:33

## 2024-01-01 RX ADMIN — ALLOPURINOL 200 MG: 100 TABLET ORAL at 08:16

## 2024-01-01 RX ADMIN — FUROSEMIDE 20 MG: 20 TABLET ORAL at 08:34

## 2024-01-01 RX ADMIN — IRON SUCROSE 300 MG: 20 INJECTION, SOLUTION INTRAVENOUS at 13:22

## 2024-01-01 RX ADMIN — PROPOFOL 30 MG: 10 INJECTION, EMULSION INTRAVENOUS at 13:55

## 2024-01-01 RX ADMIN — SODIUM CHLORIDE, POTASSIUM CHLORIDE, SODIUM LACTATE AND CALCIUM CHLORIDE: 600; 310; 30; 20 INJECTION, SOLUTION INTRAVENOUS at 07:30

## 2024-01-01 RX ADMIN — METOPROLOL SUCCINATE 25 MG: 25 TABLET, EXTENDED RELEASE ORAL at 08:16

## 2024-01-01 RX ADMIN — SODIUM CHLORIDE 250 ML: 9 INJECTION, SOLUTION INTRAVENOUS at 13:22

## 2024-01-01 RX ADMIN — ALLOPURINOL 200 MG: 100 TABLET ORAL at 08:35

## 2024-01-01 RX ADMIN — CEFEPIME 2 G: 2 INJECTION, POWDER, FOR SOLUTION INTRAVENOUS at 17:31

## 2024-01-01 RX ADMIN — IRON SUCROSE 300 MG: 20 INJECTION, SOLUTION INTRAVENOUS at 14:41

## 2024-01-01 RX ADMIN — PROPOFOL 200 MG: 10 INJECTION, EMULSION INTRAVENOUS at 20:40

## 2024-01-01 RX ADMIN — PIOGLITAZONE 30 MG: 30 TABLET ORAL at 08:16

## 2024-01-01 RX ADMIN — CEFEPIME 2 G: 2 INJECTION, POWDER, FOR SOLUTION INTRAVENOUS at 10:56

## 2024-01-01 RX ADMIN — PIOGLITAZONE 30 MG: 30 TABLET ORAL at 15:43

## 2024-01-01 RX ADMIN — SERTRALINE HYDROCHLORIDE 150 MG: 50 TABLET ORAL at 08:15

## 2024-01-01 RX ADMIN — HYDROMORPHONE HYDROCHLORIDE 0.5 MG: 1 INJECTION, SOLUTION INTRAMUSCULAR; INTRAVENOUS; SUBCUTANEOUS at 14:35

## 2024-01-01 RX ADMIN — PROPOFOL 30 MG: 10 INJECTION, EMULSION INTRAVENOUS at 15:22

## 2024-01-01 RX ADMIN — Medication 1 PACKET: at 08:17

## 2024-01-01 RX ADMIN — METOPROLOL SUCCINATE 25 MG: 25 TABLET, EXTENDED RELEASE ORAL at 08:34

## 2024-01-01 RX ADMIN — SODIUM CHLORIDE 250 ML: 9 INJECTION, SOLUTION INTRAVENOUS at 14:41

## 2024-01-01 RX ADMIN — CEFEPIME 2 G: 2 INJECTION, POWDER, FOR SOLUTION INTRAVENOUS at 18:51

## 2024-01-01 RX ADMIN — CEFEPIME 2 G: 2 INJECTION, POWDER, FOR SOLUTION INTRAVENOUS at 18:29

## 2024-01-01 RX ADMIN — CEFEPIME 2 G: 2 INJECTION, POWDER, FOR SOLUTION INTRAVENOUS at 02:13

## 2024-01-01 RX ADMIN — POTASSIUM CHLORIDE 10 MEQ: 750 TABLET, EXTENDED RELEASE ORAL at 08:35

## 2024-01-01 RX ADMIN — FOLIC ACID 1 MG: 1 TABLET ORAL at 20:49

## 2024-01-01 RX ADMIN — BUPIVACAINE HYDROCHLORIDE AND EPINEPHRINE BITARTRATE 30 ML: 5; .005 INJECTION, SOLUTION PERINEURAL at 10:31

## 2024-01-01 RX ADMIN — ACETAMINOPHEN 975 MG: 325 TABLET, FILM COATED ORAL at 10:37

## 2024-01-01 RX ADMIN — FERROUS SULFATE TAB 325 MG (65 MG ELEMENTAL FE) 325 MG: 325 (65 FE) TAB at 08:16

## 2024-01-01 RX ADMIN — PIOGLITAZONE 30 MG: 30 TABLET ORAL at 08:33

## 2024-01-01 RX ADMIN — ALLOPURINOL 200 MG: 100 TABLET ORAL at 08:33

## 2024-01-01 RX ADMIN — FUROSEMIDE 20 MG: 20 TABLET ORAL at 10:45

## 2024-01-01 RX ADMIN — FOLIC ACID 1 MG: 1 TABLET ORAL at 10:44

## 2024-01-01 RX ADMIN — Medication 2 G: at 10:44

## 2024-01-01 RX ADMIN — DEXAMETHASONE SODIUM PHOSPHATE 4 MG: 4 INJECTION, SOLUTION INTRA-ARTICULAR; INTRALESIONAL; INTRAMUSCULAR; INTRAVENOUS; SOFT TISSUE at 10:50

## 2024-01-01 RX ADMIN — SERTRALINE HYDROCHLORIDE 150 MG: 50 TABLET ORAL at 10:46

## 2024-01-01 ASSESSMENT — ACTIVITIES OF DAILY LIVING (ADL)
ADLS_ACUITY_SCORE: 20
ADLS_ACUITY_SCORE: 20
ADLS_ACUITY_SCORE: 35
ADLS_ACUITY_SCORE: 20
ADLS_ACUITY_SCORE: 20
DEPENDENT_IADLS:: TRANSPORTATION
ADLS_ACUITY_SCORE: 20
ADLS_ACUITY_SCORE: 20
ADLS_ACUITY_SCORE: 21
ADLS_ACUITY_SCORE: 20
ADLS_ACUITY_SCORE: 20
ADLS_ACUITY_SCORE: 37
ADLS_ACUITY_SCORE: 20
ADLS_ACUITY_SCORE: 21
ADLS_ACUITY_SCORE: 37
ADLS_ACUITY_SCORE: 20
ADLS_ACUITY_SCORE: 37
ADLS_ACUITY_SCORE: 37
ADLS_ACUITY_SCORE: 20
IADL_COMMENTS: PT REPORTS BEING IND W/ ALL IADL'S AT BASELINE PRIOR TO ADMISSION. PT REPORTS THAT THEY STILL DRIVE.
ADLS_ACUITY_SCORE: 20
ADLS_ACUITY_SCORE: 20
ADLS_ACUITY_SCORE: 21
ADLS_ACUITY_SCORE: 21
ADLS_ACUITY_SCORE: 20
ADLS_ACUITY_SCORE: 21
ADLS_ACUITY_SCORE: 18
ADLS_ACUITY_SCORE: 20
ADLS_ACUITY_SCORE: 20
ADLS_ACUITY_SCORE: 21
ADLS_ACUITY_SCORE: 20
ADLS_ACUITY_SCORE: 37
ADLS_ACUITY_SCORE: 20
ADLS_ACUITY_SCORE: 35
ADLS_ACUITY_SCORE: 20
ADLS_ACUITY_SCORE: 20
ADLS_ACUITY_SCORE: 37
ADLS_ACUITY_SCORE: 20
ADLS_ACUITY_SCORE: 20
PREVIOUS_RESPONSIBILITIES: MEAL PREP;HOUSEKEEPING;LAUNDRY;MEDICATION MANAGEMENT;FINANCES;DRIVING
ADLS_ACUITY_SCORE: 20
ADLS_ACUITY_SCORE: 20
ADLS_ACUITY_SCORE: 19
ADLS_ACUITY_SCORE: 37
ADLS_ACUITY_SCORE: 21
ADLS_ACUITY_SCORE: 20
ADLS_ACUITY_SCORE: 21
ADLS_ACUITY_SCORE: 20
ADLS_ACUITY_SCORE: 37
ADLS_ACUITY_SCORE: 20
ADLS_ACUITY_SCORE: 37
ADLS_ACUITY_SCORE: 20
ADLS_ACUITY_SCORE: 37
ADLS_ACUITY_SCORE: 20
ADLS_ACUITY_SCORE: 35
ADLS_ACUITY_SCORE: 20

## 2024-01-01 ASSESSMENT — COLUMBIA-SUICIDE SEVERITY RATING SCALE - C-SSRS
6. HAVE YOU EVER DONE ANYTHING, STARTED TO DO ANYTHING, OR PREPARED TO DO ANYTHING TO END YOUR LIFE?: NO
1. IN THE PAST MONTH, HAVE YOU WISHED YOU WERE DEAD OR WISHED YOU COULD GO TO SLEEP AND NOT WAKE UP?: NO
2. HAVE YOU ACTUALLY HAD ANY THOUGHTS OF KILLING YOURSELF IN THE PAST MONTH?: NO

## 2024-01-01 ASSESSMENT — ENCOUNTER SYMPTOMS
DYSRHYTHMIAS: 1

## 2024-01-01 ASSESSMENT — PAIN SCALES - GENERAL
PAINLEVEL: MODERATE PAIN (4)
PAINLEVEL: MODERATE PAIN (4)

## 2024-01-03 NOTE — PROGRESS NOTES
Infusion Nursing Note:  Fer Myles presents today for Injectafer #2 of 2.    Patient seen by provider today: No   present during visit today: Not Applicable.    Note: Matias reports he tolerated his first Injectafer infusion with no issues.  He states he is feeling well today and denies any new or concerning symptoms.    Intravenous Access:  Peripheral IV placed.    Treatment Conditions:   10/22/23 07:12   Ferritin 37   Iron 25 (L)   Iron Binding Capacity 220 (L)   Iron Sat Index 11 (L)   Hemoglobin 9.6 (L)     Post Infusion Assessment:  Patient tolerated infusion without incident.  Patient observed for 30 minutes post Injectafer per protocol.  Blood return noted pre and post infusion.  Site patent and intact, free from redness, edema or discomfort.  No evidence of extravasations.  Access discontinued per protocol.     Discharge Plan:   Discharge instructions reviewed with: Patient.  Patient and/or family verbalized understanding of discharge instructions and all questions answered.  AVS to patient via [facility].  Patient will follow-up with ordering provider, no further infusions needed at this time.   Patient discharged in stable condition accompanied by: self.  Departure Mode: Ambulatory.      Alex Shanks RN

## 2024-01-04 PROBLEM — L97.912 CHRONIC ULCER OF RIGHT LEG WITH FAT LAYER EXPOSED (H): Status: ACTIVE | Noted: 2024-01-01

## 2024-01-04 PROBLEM — L97.522: Status: ACTIVE | Noted: 2024-01-01

## 2024-01-04 PROBLEM — L97.512: Status: ACTIVE | Noted: 2024-01-01

## 2024-01-04 NOTE — PROGRESS NOTES
[facility]    ASSESSMENT:   Chronic toe ulcer, right, with fat layer exposed, previous surgical incision, wound dehiscence-slightly improved  (L97.511) Ulcer of right second toe, limited to breakdown of skin (H)-hypergranular  (L97.912) Chronic ulcer of right leg with fat layer exposed (H)  Chronic lower extremity edema/venous stasis    PLAN/DISCUSSION:   Chronic lower extremity edema with the R>L. He has not been wearing compression for the past several months, prior to that inconsistent use with compression stockings that he has had for over a decade.   Wound care plan: Hydrofera Blue to all wounds, criticaid to between his toes with edemawear. Dressing will be performed by patient versus home care. Toe wounds should be changed daily. See bottom of note for detailed wound care and patient instructions  No bodies of water until wounds healed  Discussed with Matias that he is at a high risk for infection  He should wear surgical shoe consistently to offload pressure from 2nd toe  Dietary recommendations discussed, see AVS       HISTORY OF PRESENT ILLNESS:   Fer Myles is a 63 year old male with a history of of a right foot hallux amputation on 10/21/2023 for osteomyelitis, hx of diabetes, hypertension, chronic lower extremity edema who presents today with numerous wounds, including previous surgical incision of the right hallux that dehisced. He also has a wound that started mid-December 2023 on his right second toe in addition to his right lateral calf. The right second toe wound continues to drain heavily. Denies fevers, chills, infection. He has not been wearing surgical shoe, no compression for the last few months due to generalized weakness.     Hx of diabetes with bilateral lower extremity neuropathy.     Patient Active Problem List   Diagnosis    paroxysmal atrial fib    Alcohol abuse    Hypertension    Morbid obesity (H)    Melena    Gastrointestinal hemorrhage, unspecified  "gastrointestinal hemorrhage type    Osteomyelitis of great toe of right foot (H)    Acute posthemorrhagic anemia    Chronic toe ulcer, right, with fat layer exposed (H)    Chronic ulcer of right leg with fat layer exposed (H)    Chronic toe ulcer, left, with fat layer exposed (H)       MEDICATIONS:   Current Outpatient Medications   Medication    allopurinol (ZYLOPRIM) 100 MG tablet    apixaban ANTICOAGULANT (ELIQUIS) 5 MG tablet    balsalazide (COLAZAL) 750 MG capsule    ferrous sulfate (FE TABS) 325 (65 Fe) MG EC tablet    furosemide (LASIX) 20 MG tablet    JARDIANCE 25 MG TABS tablet    lisinopril (ZESTRIL) 20 MG tablet    metoprolol succinate ER (TOPROL XL) 25 MG 24 hr tablet    omega 3 1000 MG CAPS    pantoprazole (PROTONIX) 40 MG EC tablet    pioglitazone (ACTOS) 30 MG tablet    potassium chloride haja er (K-DUR)    sertraline (ZOLOFT) 100 MG tablet    sulfamethoxazole-trimethoprim (BACTRIM DS) 800-160 MG tablet     No current facility-administered medications for this encounter.       VITALS: BP (!) 148/85   Pulse 61   Temp (!) 96.3  F (35.7  C) (Temporal)   Ht 1.803 m (5' 11\")   Wt 119.7 kg (264 lb)   BMI 36.82 kg/m       PHYSICAL EXAM:  GENERAL: Patient is alert and oriented and in no acute distress  CV: pedal pulses palpable  INTEGUMENTARY:   Wound (used by [facility] only) 01/04/24 1228 Right dorsal 2 toe neuropathic ulcer (Active)   Thickness/Stage full thickness 01/04/24 1200   Base hypergranulation;slough 01/04/24 1200   Periwound macerated;pink;swelling 01/04/24 1200   Periwound Temperature warm 01/04/24 1200   Periwound Skin Turgor soft 01/04/24 1200   Edges open 01/04/24 1200   Length (cm) 3.5 01/04/24 1200   Width (cm) 1.9 01/04/24 1200   Depth (cm) 0.2 01/04/24 1200   Wound (cm^2) 6.65 cm^2 01/04/24 1200   Wound Volume (cm^3) 1.33 cm^3 01/04/24 1200   Drainage Characteristics/Odor serosanguineous 01/04/24 1200   Drainage Amount large 01/04/24 1200   Care, Wound debrided;chemical cautery applied " 01/04/24 1200       Wound (used by [facility] only) 01/04/24 1228 Right medial 1 toe surgical (Active)   Thickness/Stage full thickness 01/04/24 1200   Base slough;pink 01/04/24 1200   Periwound intact 01/04/24 1200   Periwound Temperature cool 01/04/24 1200   Periwound Skin Turgor firm 01/04/24 1200   Edges open 01/04/24 1200   Length (cm) 1.6 01/04/24 1200   Width (cm) 0.8 01/04/24 1200   Depth (cm) 0.2 01/04/24 1200   Wound (cm^2) 1.28 cm^2 01/04/24 1200   Wound Volume (cm^3) 0.26 cm^3 01/04/24 1200   Drainage Characteristics/Odor serosanguineous 01/04/24 1200   Drainage Amount moderate 01/04/24 1200   Care, Wound debrided;chemical cautery applied 01/04/24 1200       Wound (used by [facility] only) 01/04/24 1229 Right lateral;lower leg ulceration, venous (Active)   Thickness/Stage full thickness 01/04/24 1200   Base pink;slough 01/04/24 1200   Periwound pink;swelling 01/04/24 1200   Periwound Temperature warm 01/04/24 1200   Periwound Skin Turgor firm 01/04/24 1200   Length (cm) 3.2 01/04/24 1200   Width (cm) 2.5 01/04/24 1200   Depth (cm) 0.3 01/04/24 1200   Wound (cm^2) 8 cm^2 01/04/24 1200   Wound Volume (cm^3) 2.4 cm^3 01/04/24 1200   Drainage Characteristics/Odor serosanguineous 01/04/24 1200   Drainage Amount moderate 01/04/24 1200   Care, Wound debrided;chemical cautery applied 01/04/24 1200       Wound (used by [facility] only) 01/04/24 1321 Left dorsal 2 toe neuropathic ulcer (Active)   Thickness/Stage full thickness 01/04/24 1300   Base red;slough 01/04/24 1300   Length (cm) 2.5 01/04/24 1300   Width (cm) 2.1 01/04/24 1300   Depth (cm) 0.2 01/04/24 1300   Wound (cm^2) 5.25 cm^2 01/04/24 1300   Wound Volume (cm^3) 1.05 cm^3 01/04/24 1300   Drainage Characteristics/Odor serosanguineous 01/04/24 1300   Drainage Amount moderate 01/04/24 1300   Care, Wound non-select wound debridement performed. 01/04/24 1300       Incision/Surgical Site 10/31/22 Left Shoulder (Active)       Incision/Surgical Site 10/21/23 Right Toe  (Comment  which one) (Active)                   PROCEDURES: 4% topical lidocaine was applied to the wound by the nursing staff. Patient was determined to be capable of making their own medical decisions and informed consent was obtained. Using a curette a selective debridement of non-viable tissue was performed of <20cm2. Hemostasis was achieved with pressure. The patient tolerated the procedure well.      MDM: 45 minutes were spent on the date of the visit reviewing previous chart notes, evaluating patient and developing the treatment plan, this excludes any time spent on procedures.    PATIENT INSTRUCTIONS      Further instructions from your care team         Fer Myles      1960        Dressing changes outside of clinic are being performed by Home Care    Wound Dressing Change: right medial toe 1, right dorsal toe 2  - Wash your hands with soap and water before you begin your dressing change and prepare a clean surface for dressings.  - Cleanse with mild unscented soap (such as Cetaphil, Cerave or Dove) and water  - Apply small amount of VASHE on gauze, lay into wound bed, let sit for 10 minutes, remove gauze (do not rinse)  - Apply CriticAid paste to small fissures under right toe 2 and 5;  - Apply thick, high-quality moisturizer to intact skin otherwise (such as CeraVe, Eucerin, Aquaphor or Vanicream)      Wound Dressing Change: right lateral lower leg:  - Cleanse with mild unscented soap (such as Cetaphil, Cerave or Dove) and water  - Apply small amount of VASHE on gauze, lay into wound bed, let sit for 10 minutes, remove gauze (do not rinse)  - Apply thick, high-quality moisturizer to intact skin (such as CeraVe, Eucerin, Aquaphor or Vanicream)    Compression:   Your compression is Coflex 2 layer wrap and can be removed at night and put back on first thing in the morning.   Please remove compression dressing if toes turn blue and/or tingle and can not be relieved by raising the leg for one hour. If  unable to reapply in the morning, keep compression on until next dressing change.    Walk/exercise/foot pumps as much as you can, as you are able.       Elevate: your legs above the level of your heart for 30 minutes: approximately 2-3 times each day   Ways to do this:   - Lay on the couch or your bed and prop your legs up on pillows   - Recline back as far as you can go in your recliner and prop your legs on pillows.     Protein:  A diet high in protein is important for wound healing, we recommend getting 90 grams of protein per day. Taking protein shakes or bars are a good way to get extra protein in your diet.   Good sources of protein:  Pork 26g per 3 oz  Whey protein powder - 24g per scoop (on average)  Greek yogurt - 23g per 8oz   Chicken or Turkey - 23g per 3oz  Fish - 20-25g per 3oz  Beef - 18-23g per 3oz  Navy beans - 20g per cup  Cottage cheese - 14g per 1/2 cup   Lentils - 13g per 1/4 cup  Beef jerky 13g per 1oz  2% milk - 8g per cup  Peanut butter - 8g per 2 tablespoons  Eggs - 6g per egg  Mixed nuts - 6g per 2oz    Keep blood sugars below 150 and A1C below 7          Rosalina Estrada NP January 4, 2024    Call us at [phone] if you have any questions about your wounds, have redness or swelling around your wound, have a fever of 101 degrees Fahrenheit or greater or if you have any other problems or concerns. We answer the phone Monday through Friday 8 am to 4 pm, please leave a message as we check the voicemail frequently throughout the day.     If you had a positive experience please indicate that on your patient satisfaction survey form that [facility] will be sending you.    It was a pleasure meeting with you today.  Thank you for allowing me and my team the privilege of caring for you today.  YOU are the reason we are here, and I truly hope we provided you with the excellent service you deserve.  Please let us know if there is anything else we can do for you so that we can be sure you are  leaving completely satisfied with your care experience.      If you have any billing related questions please call the [facility] Business office at [phone]. The clinic staff does not handle billing related matters.    If you are scheduled to have a follow up appointment, you will receive a reminder call the day before your visit. On the appointment day please arrive 15 minutes prior to your appointment time. If you are unable to keep that appointment, please call the clinic to cancel or reschedule. If you are more than 10 minutes late or greater for your scheduled appointment time, the clinic policy is that you may be asked to reschedule.         Electronically signed by Rosalina Estrada CNP on January 4, 2024

## 2024-01-04 NOTE — TELEPHONE ENCOUNTER
Home care referral faxed to:    [facility] Phone: [phone] Fax: [phone]    [facility] office phone [phone] Fax [phone]    [facility] phone [phone] fax [phone]    [facility] Phone [phone] Fax [phone]     [facility] Phone: [phone] Fax [phone]   [facility] Phone [phone] Fax [phone]     [facility] (Phone: [phone] Fax: [phone])         [facility] Phone: [phone]  [facility] F[facility] new referrals: [phone] Nurse line fax: [phone]    [facility] Phone: [phone] Fax: [phone]    [facility] Phone: [phone] Fax: [phone]      [facility] Phone: [phone] Fax [phone]  Fax for new referrals: [phone]

## 2024-01-04 NOTE — DISCHARGE INSTRUCTIONS
Fer Myles      1960    A DME order for supplies has been placed to [facility], [address]. If there are any issues with your order including not receiving the order please call [facility] at [phone] option 1. They can also provide a tracking number for you if you had supplies shipped to you.    Toe dressing changes outside of clinic are being performed by Patient    Home care referral placed.  Someone will call you when they are able to accept this referral.    Do not get right leg wet in shower--Keep leg dry with use of a cast protector (available at [facility] or [employer] membrane)     Wound Dressing Change: right medial toe 1, right dorsal toe 2, left dorsal toe 2  - Wash your hands with soap and water before you begin your dressing change and prepare a clean surface for dressings.  - Cleanse with mild unscented soap (such as Cetaphil, Cerave or Dove) and water  - Apply small amount of VASHE on gauze, lay into wound bed, let sit for 10 minutes, remove gauze (do not rinse)  - If able, apply CriticAid paste to skin around wounds & to small fissures under right toe 2 and 5;  - Apply 1/15 Hydrofera Blue Ready-Transfer to each wound (cut-to-fit size of wound)  - Secure with MTSpandage size 1 or BandAid if MTSpandage doesn't work  - Wear surgical shoe (to offload pressure on toe 2)  Change daily by patient until home care begins, then home care to change 2-3 times weekly    Wound Dressing Change: right lateral lower leg:  - Cleanse with mild unscented soap (such as Cetaphil, Cerave or Dove) and water  - Apply small amount of VASHE on gauze, lay into wound bed, let sit for 10 minutes, remove gauze (do not rinse)  - Apply thick, high-quality moisturizer to intact skin (such as CeraVe, Eucerin, Aquaphor or Vanicream)  - Apply ~1/8 Hydrofera Blue Ready-Transfer   - Apply Spandage size 7/MTSpandage size 4 from toes to knee  - Apply 2-layer coflex wrap from just above toes to just below knee  Change at  [facility] until home care begins, then home care to change 2-3 times weekly    Compression:   Your compression is Coflex 2 layer wrap should stay on until next visit.   Please remove compression dressing if toes turn blue and/or tingle and can not be relieved by raising the leg for one hour.     It is very important to follow your healthcare providers instructions. Studies indicate when compression therapy is applied as directed, healing may occur faster and more completely. Do Not remove CoFlex unless your healthcare provider tells you to remove it.  Helpful Tips:  -Your bandage may feel tight at first. This is normal. When the swelling goes down, it will not feel as tight.  -Wear the stocking that comes with the system over the bandage to help you put on shoes and clothing  -Wear comfortable shoes and walk regularly. Walking will improve your circulation which will improve healing.  -Keep your bandage dry.   -CoFlex maybe left on your leg for up to 7 days. After that your doctor or nurse will apply a new CoFlex.   -Call your doctor or nurse if the bandage gets wet, slips down or if you have pain, tingling, numbness or discoloration.      Left leg skin care, daily in the morning:  - Cleanse with mild unscented soap (such as Cetaphil, Cerave or Dove) and water  - Apply thick, high-quality moisturizer to intact skin (such as CeraVe, Eucerin, Aquaphor or Vanicream)  - Apply Spandigrip size F or compression stocking from home (remove at night)    Walk/exercise/foot pumps as much as you can, as you are able.       Elevate: your legs above the level of your heart for 30 minutes: approximately 2-3 times each day   Ways to do this:   - Lay on the couch or your bed and prop your legs up on pillows   - Recline back as far as you can go in your recliner and prop your legs on pillows.     Low sodium, high protein diet:  A diet high in protein is important for wound healing, we recommend getting 90 grams of protein per day. Taking  protein shakes or bars are a good way to get extra protein in your diet.   Good sources of protein:  Pork 26g per 3 oz  Whey protein powder - 24g per scoop (on average)  Greek yogurt - 23g per 8oz   Chicken or Turkey - 23g per 3oz  Fish - 20-25g per 3oz  Beef - 18-23g per 3oz  Navy beans - 20g per cup  Cottage cheese - 14g per 1/2 cup   Lentils - 13g per 1/4 cup  Beef jerky 13g per 1oz  2% milk - 8g per cup  Peanut butter - 8g per 2 tablespoons  Eggs - 6g per egg  Mixed nuts - 6g per 2oz    Keep blood sugars below 150 and A1C below 7          Rosalina Estrada NP January 4, 2024    Call us at [phone] if you have any questions about your wounds, have redness or swelling around your wound, have a fever of 101 degrees Fahrenheit or greater or if you have any other problems or concerns. We answer the phone Monday through Friday 8 am to 4 pm, please leave a message as we check the voicemail frequently throughout the day.     If you had a positive experience please indicate that on your patient satisfaction survey form that [facility] will be sending you.    It was a pleasure meeting with you today.  Thank you for allowing me and my team the privilege of caring for you today.  YOU are the reason we are here, and I truly hope we provided you with the excellent service you deserve.  Please let us know if there is anything else we can do for you so that we can be sure you are leaving completely satisfied with your care experience.      If you have any billing related questions please call the [facility] Business office at [phone]. The clinic staff does not handle billing related matters.    If you are scheduled to have a follow up appointment, you will receive a reminder call the day before your visit. On the appointment day please arrive 15 minutes prior to your appointment time. If you are unable to keep that appointment, please call the clinic to cancel or reschedule. If you are more than 10 minutes late or  greater for your scheduled appointment time, the clinic policy is that you may be asked to reschedule.

## 2024-01-04 NOTE — PROGRESS NOTES
Patient arrived for wound care visit. Certified Wound Care Nurse time spent evaluating patient record, completed a full evaluation and documented wound(s) & roger-wound skin; provided recommendation based on treatment plan. Applied dressing, reviewed discharge instructions, patient education, and discussed plan of care with appropriate medical team staff members and patient and/or family members.

## 2024-01-05 NOTE — TELEPHONE ENCOUNTER
[facility] called to update the clinic that they are unable to take any patient's at this time.

## 2024-01-05 NOTE — TELEPHONE ENCOUNTER
[facility] called and they are looking into the referral and may be able to start services 1/8/24. They will call back to confirm if the patient is accepted.

## 2024-01-05 NOTE — TELEPHONE ENCOUNTER
Call received from [place]. They are able to accept the patient and start care on 1/10/24. Ok given for delay of start of care.

## 2024-01-19 NOTE — ADDENDUM NOTE
Encounter addended by: Allie Martinez RN on: 1/19/2024 1:28 PM   Actions taken: Flowsheet data copied forward, Flowsheet accepted

## 2024-01-19 NOTE — DISCHARGE INSTRUCTIONS
Fer Myles      1960    A DME order was not completed because the supplies are ordered by home care or at a care facility    Dressing changes outside of clinic are being performed by Home Care    [facility] Phone: [phone] Fax: [phone] New referrals [phone]    Do not submerge your feet in any water. Keep them clean and dry.     PLAN  -Please call the scheduling  to schedule your MRI appointment at [facility]: [phone]  -Antibiotics have been sen to your pharmacy. Take as directed. Take with food.   -If you have fevers or chills go to the ED for evaluation   -Call us if the drainage on your right leg continues to have green drainage.   -Offload your foot as much as possible     Wound Dressing Change: right dorsal toe 2 and left dorsal toe 2  - Wash your hands with soap and water before you begin your dressing change and  prepare a clean surface for dressings.  - Cleanse with mild unscented soap (such as Cetaphil, Cerave or Dove) and water  - Apply small amount of VASHE on gauze, lay into wound bed, let sit for 10 minutes, remove gauze (do not rinse)  - If able, apply CriticAid paste to skin around wounds & to small fissures under right toe 2 and 5;  - Apply a pea sized amount of  iodosorb to wound beds  - Cover with 1 medipore plus pad  to each wound or use gauze and roll gauze with medipore tape   - Wear surgical shoe (to offload pressure on toe 2)  -Change three times per week with home care    Wound Dressing Change: right medial toe 1  - Wash your hands with soap and water before you begin your dressing change and prepare a clean surface for dressings.  - Cleanse with mild unscented soap (such as Cetaphil, Cerave or Dove) and water  - Apply small amount of VASHE on gauze, lay into wound bed, let sit for 10 minutes, remove gauze (do not rinse)  - If able, apply CriticAid paste to skin around wounds & to small fissures under right toe 2 and 5;  - Apply  a pea sized amount of wound'res gel to wound bed   - Cover with 1 medipore plus pad or use gauze and roll gauze with medipore tape   - Wear surgical shoe (to offload pressure on toe 2)  -Change three times per week with home care      Wound Dressing Change: right lateral lower leg:  - Cleanse with mild unscented soap (such as Cetaphil, Cerave or Dove) and water  - Apply small amount of VASHE on gauze, lay into wound bed, let sit for 10 minutes,  remove gauze (do not rinse)  - Apply thick, high-quality moisturizer to intact skin (such as CeraVe, Eucerin,  Aquaphor or Vanicream)  - Apply ~1/8th piece of 4x5 Hydrofera Blue Ready-Transfer  - Apply 1/4 5x9 ABD pad   -Secure with roll gauze and medipore tape  - Apply Spandage size 7/MTSpandage size 4 from toes to knee  - Apply spandagrip size G from toes to behind the knee  -Change three times per week with home care    Left leg skin care, daily in the morning:  - Cleanse with mild unscented soap (such as Cetaphil, Cerave or Dove) and water  - Apply thick, high-quality moisturizer to intact skin (such as CeraVe, Eucerin,  Aquaphor or Vanicream)  - Apply Spandigrip size F or compression stocking from home (remove at night)    Walk/exercise/foot pumps as much as you can, as you are able.  Elevate: your legs above the level of your heart for 30 minutes: approximately 2-3  times each day  Ways to do this:  - Lay on the couch or your bed and prop your legs up on pillows  - Recline back as far as you can go in your recliner and prop your legs on pillows.      Compression:   Your compression is Spandagrip G and can be removed at night and put back on first thing in the morning.   Please remove compression dressing if toes turn blue and/or tingle and can not be relieved by raising the leg for one hour. If unable to reapply in the morning, keep compression on until next dressing change.    Walk as much as you can, as you are able. Whenever you sit raise your ankle above your hips  to promote wound healing.         Low sodium, high protein diet:  A diet high in protein is important for wound healing, we recommend getting 90  grams of protein per day. Taking protein shakes or bars are a good way to get extra  protein in your diet.  Good sources of protein:  Pork 26g per 3 oz  Whey protein powder - 24g per scoop (on average)  Greek yogurt - 23g per 8oz  Chicken or Turkey - 23g per 3oz  Fish - 20-25g per 3oz  Beef - 18-23g per 3oz  Navy beans - 20g per cup  Cottage cheese - 14g per 1/2 cup  Lentils - 13g per 1/4 cup  Beef jerky 13g per 1oz  2% milk - 8g per cup  Peanut butter - 8g per 2 tablespoons  Eggs - 6g per egg  Mixed nuts - 6g per 2oz      Keep blood sugars below 150 and A1C below 7       Main Provider: Rosalina Estrada NP January 19, 2024    Call us at [phone] if you have any questions about your wounds, have redness or swelling around your wound, have a fever of 101 degrees Fahrenheit or greater or if you have any other problems or concerns. We answer the phone Monday through Friday 8 am to 4 pm, please leave a message as we check the voicemail frequently throughout the day.     If you had a positive experience please indicate that on your patient satisfaction survey form that [facility] will be sending you.    It was a pleasure meeting with you today.  Thank you for allowing me and my team the privilege of caring for you today.  YOU are the reason we are here, and I truly hope we provided you with the excellent service you deserve.  Please let us know if there is anything else we can do for you so that we can be sure you are leaving completely satisfied with your care experience.      If you have any billing related questions please call the [facility] Business office at [phone]. The clinic staff does not handle billing related matters.    If you are scheduled to have a follow up appointment, you will receive a reminder call the day before your visit. On the appointment day  please arrive 15 minutes prior to your appointment time. If you are unable to keep that appointment, please call the clinic to cancel or reschedule. If you are more than 10 minutes late or greater for your scheduled appointment time, the clinic policy is that you may be asked to reschedule.

## 2024-01-19 NOTE — PROGRESS NOTES
[facility]    ASSESSMENT:   Chronic toe ulcer, right, with fat layer exposed, previous surgical incision, wound dehiscence-slightly improved  (L97.511) Ulcer of right second toe, limited to breakdown of skin (H)-concern for infection, worsening with new toe wound  (L97.912) Chronic ulcer of right leg with fat layer exposed (H)-slightly improved  Chronic lower extremity edema/venous stasis    PLAN/DISCUSSION:   Chronic lower extremity edema with the R>L. He has not been wearing compression for the past several months, prior to that inconsistent use with compression stockings that he has had for over a decade. Has not been doing 2 layer wraps.  Wound care plan: Iodosorb to all wounds except medial first toe-place collagen gel. To right lateral calf wound continue with hydrofera. Dressing will be performed by home care agency. See bottom of note for detailed wound care and patient instructions  Concern for infection from 2nd toe. Warm to touch, increase in drainage, worsening of the wound, and localized edema. Matias denies fevers/chills. Will obtain MRI and start on Keflex.   Dietary recommendations discussed, see AVS   He should wear surgical shoe consistently to offload pressure from 2nd toe    Interval Hx: New concern for infection of his 2nd toe with associated symptoms. Debrided all wounds. Significant drainage from toe, has not been doing 2 layer wrap      HISTORY OF PRESENT ILLNESS:   Fer Myles is a 63 year old male with a history of of a right foot hallux amputation on 10/21/2023 for osteomyelitis, hx of diabetes, hypertension, chronic lower extremity edema who presents today with numerous wounds, including previous surgical incision of the right hallux that dehisced. He also has a wound that started mid-December 2023 on his right second toe in addition to his right lateral calf. The right second toe wound continues to drain heavily. Denies fevers, chills, infection. He has not been  wearing surgical shoe, no compression for the last few months due to generalized weakness.      Hx of diabetes with bilateral lower extremity neuropathy.   Patient Active Problem List   Diagnosis    paroxysmal atrial fib    Alcohol abuse    Hypertension    Morbid obesity (H)    Melena    Gastrointestinal hemorrhage, unspecified gastrointestinal hemorrhage type    Osteomyelitis of great toe of right foot (H)    Acute posthemorrhagic anemia    Chronic toe ulcer, right, with fat layer exposed (H)    Chronic ulcer of right leg with fat layer exposed (H)    Chronic toe ulcer, left, with fat layer exposed (H)         MEDICATIONS:   Current Outpatient Medications   Medication    allopurinol (ZYLOPRIM) 100 MG tablet    apixaban ANTICOAGULANT (ELIQUIS) 5 MG tablet    balsalazide (COLAZAL) 750 MG capsule    ferrous sulfate (FE TABS) 325 (65 Fe) MG EC tablet    furosemide (LASIX) 20 MG tablet    JARDIANCE 25 MG TABS tablet    lisinopril (ZESTRIL) 20 MG tablet    metoprolol succinate ER (TOPROL XL) 25 MG 24 hr tablet    omega 3 1000 MG CAPS    pantoprazole (PROTONIX) 40 MG EC tablet    pioglitazone (ACTOS) 30 MG tablet    potassium chloride haja er (K-DUR)    sertraline (ZOLOFT) 100 MG tablet    sulfamethoxazole-trimethoprim (BACTRIM DS) 800-160 MG tablet     No current facility-administered medications for this encounter.       VITALS: /71   Pulse (!) 139   Temp 97.1  F (36.2  C) (Temporal)      PHYSICAL EXAM:  GENERAL: Patient is alert and oriented and in no acute distress  INTEGUMENTARY:   Wound (used by OP [facility] only) 01/04/24 1228 Right dorsal 2 toe neuropathic ulcer (Active)   Thickness/Stage full thickness 01/19/24 1107   Base hypergranulation;slough 01/19/24 1107   Periwound macerated;pink;swelling 01/19/24 1107   Periwound Temperature warm 01/19/24 1107   Periwound Skin Turgor soft 01/19/24 1107   Edges open 01/19/24 1107   Length (cm) 2.7 01/19/24 1107   Width (cm) 1.2 01/19/24 1107   Depth (cm) 0.3 01/19/24  1107   Wound (cm^2) 3.24 cm^2 01/19/24 1107   Wound Volume (cm^3) 0.97 cm^3 01/19/24 1107   Wound healing % 51.28 01/19/24 1107   Undermining [Depth (cm)/Location] 12 o'clock / 0.5cm 01/19/24 1107   Drainage Characteristics/Odor serosanguineous 01/19/24 1107   Drainage Amount large 01/19/24 1107   Care, Wound debrided 01/19/24 1107       Wound (used by OP I only) 01/04/24 1228 Right medial 1 toe surgical (Active)   Thickness/Stage full thickness 01/19/24 1107   Base slough;pink 01/19/24 1107   Periwound intact 01/19/24 1107   Periwound Temperature cool 01/19/24 1107   Periwound Skin Turgor firm 01/19/24 1107   Edges open 01/19/24 1107   Length (cm) 0.4 01/19/24 1107   Width (cm) 0.4 01/19/24 1107   Depth (cm) 0.2 01/19/24 1107   Wound (cm^2) 0.16 cm^2 01/19/24 1107   Wound Volume (cm^3) 0.03 cm^3 01/19/24 1107   Wound healing % 87.5 01/19/24 1107   Drainage Characteristics/Odor serosanguineous 01/19/24 1107   Drainage Amount moderate 01/19/24 1107   Care, Wound debrided 01/19/24 1107       Wound (used by OP I only) 01/04/24 1229 Right lateral;lower leg ulceration, venous (Active)   Thickness/Stage full thickness 01/19/24 1107   Base pink;slough 01/19/24 1107   Periwound pink;swelling 01/19/24 1107   Periwound Temperature warm 01/19/24 1107   Periwound Skin Turgor firm 01/19/24 1107   Length (cm) 2.8 01/19/24 1107   Width (cm) 2.4 01/19/24 1107   Depth (cm) 0.1 01/19/24 1107   Wound (cm^2) 6.72 cm^2 01/19/24 1107   Wound Volume (cm^3) 0.67 cm^3 01/19/24 1107   Wound healing % 16 01/19/24 1107   Drainage Characteristics/Odor serosanguineous 01/19/24 1107   Drainage Amount moderate 01/19/24 1107   Care, Wound debrided 01/19/24 1107       Wound (used by OP [facility] only) 01/04/24 1321 Left dorsal 2 toe neuropathic ulcer (Active)   Thickness/Stage full thickness 01/04/24 1300   Base red;slough 01/04/24 1300   Length (cm) 0.9 01/19/24 1107   Width (cm) 1.4 01/19/24 1107   Depth (cm) 0.2 01/19/24 1107   Wound (cm^2) 1.26  cm^2 01/19/24 1107   Wound Volume (cm^3) 0.25 cm^3 01/19/24 1107   Wound healing % 76 01/19/24 1107   Drainage Characteristics/Odor serosanguineous 01/04/24 1300   Drainage Amount moderate 01/04/24 1300   Care, Wound non-select wound debridement performed. 01/04/24 1300       Incision/Surgical Site 10/31/22 Left Shoulder (Active)       Incision/Surgical Site 10/21/23 Right Toe (Comment  which one) (Active)                 PROCEDURES: 4% topical lidocaine was applied to the wound by the nursing staff. Patient was determined to be capable of making their own medical decisions and informed consent was obtained. Using a curette a selective debridement of non-viable tissue was performed of <20cm2. Hemostasis was achieved with pressure. The patient tolerated the procedure well.      MDM: 45 minutes were spent on the date of the visit reviewing previous chart notes, evaluating patient and developing the treatment plan, this excludes any time spent on procedures.    PATIENT INSTRUCTIONS      Further instructions from your care team         Fer Myles      1960    A DME order was not completed because the supplies are ordered by home care or at a care facility    Dressing changes outside of clinic are being performed by Home Care    [facility] Phone: [phone] Fax: [phone] New referrals [phone]    Do not submerge your feet in any water. Keep them clean and dry.     PLAN  -Please call the scheduling  to schedule your MRI appointment at [facility]: [phone]  -Antibiotics have been sen to your pharmacy. Take as directed. Take with food.   -If you have fevers or chills go to the ED for evaluation   -Call us if the drainage on your right leg continues to have green drainage.   -Offload your foot as much as possible     Wound Dressing Change: right dorsal toe 2 and left dorsal toe 2  - Wash your hands with soap and water before you begin your dressing change  and  prepare a clean surface for dressings.  - Cleanse with mild unscented soap (such as Cetaphil, Cerave or Dove) and water  - Apply small amount of VASHE on gauze, lay into wound bed, let sit for 10 minutes, remove gauze (do not rinse)  - If able, apply CriticAid paste to skin around wounds & to small fissures under right toe 2 and 5;  - Apply a pea sized amount of  iodosorb to wound beds  - Cover with 1 medipore plus pad  to each wound  - Wear surgical shoe (to offload pressure on toe 2)  -Change three times per week with home care    Wound Dressing Change: right medial toe 1  - Wash your hands with soap and water before you begin your dressing change and prepare a clean surface for dressings.  - Cleanse with mild unscented soap (such as Cetaphil, Cerave or Dove) and water  - Apply small amount of VASHE on gauze, lay into wound bed, let sit for 10 minutes, remove gauze (do not rinse)  - If able, apply CriticAid paste to skin around wounds & to small fissures under right toe 2 and 5;  - Apply wound'res gel to wound bed   - Cover with 1 medipore plus pad   - Wear surgical shoe (to offload pressure on toe 2)  -Change three times per week with home care      Wound Dressing Change: right lateral lower leg:  - Cleanse with mild unscented soap (such as Cetaphil, Cerave or Dove) and water  - Apply small amount of VASHE on gauze, lay into wound bed, let sit for 10 minutes,  remove gauze (do not rinse)  - Apply thick, high-quality moisturizer to intact skin (such as CeraVe, Eucerin,  Aquaphor or Vanicream)  - Apply ~1/8 Hydrofera Blue Ready-Transfer  - Apply 1/4 5x9 ABD pad   - Apply Spandage size 7/MTSpandage size 4 from toes to knee  - Apply spandagrip size G from toes to behind the knee  -Change three times per week with home care    Left leg skin care, daily in the morning:  - Cleanse with mild unscented soap (such as Cetaphil, Cerave or Dove) and water  - Apply thick, high-quality moisturizer to intact skin (such as  CeraVe, Eucerin,  Aquaphor or Vanicream)  - Apply Spandigrip size F or compression stocking from home (remove at night)    Walk/exercise/foot pumps as much as you can, as you are able.  Elevate: your legs above the level of your heart for 30 minutes: approximately 2-3  times each day  Ways to do this:  - Lay on the couch or your bed and prop your legs up on pillows  - Recline back as far as you can go in your recliner and prop your legs on pillows.      Compression:   Your compression is Spandagrip G and can be removed at night and put back on first thing in the morning.   Please remove compression dressing if toes turn blue and/or tingle and can not be relieved by raising the leg for one hour. If unable to reapply in the morning, keep compression on until next dressing change.    Walk as much as you can, as you are able. Whenever you sit raise your ankle above your hips to promote wound healing.         Low sodium, high protein diet:  A diet high in protein is important for wound healing, we recommend getting 90  grams of protein per day. Taking protein shakes or bars are a good way to get extra  protein in your diet.  Good sources of protein:  Pork 26g per 3 oz  Whey protein powder - 24g per scoop (on average)  Greek yogurt - 23g per 8oz  Chicken or Turkey - 23g per 3oz  Fish - 20-25g per 3oz  Beef - 18-23g per 3oz  Navy beans - 20g per cup  Cottage cheese - 14g per 1/2 cup  Lentils - 13g per 1/4 cup  Beef jerky 13g per 1oz  2% milk - 8g per cup  Peanut butter - 8g per 2 tablespoons  Eggs - 6g per egg  Mixed nuts - 6g per 2oz      Keep blood sugars below 150 and A1C below 7       Main Provider: Rosalina Estrada NP January 19, 2024    Call us at [phone] if you have any questions about your wounds, have redness or swelling around your wound, have a fever of 101 degrees Fahrenheit or greater or if you have any other problems or concerns. We answer the phone Monday through Friday 8 am to 4 pm, please leave a message  as we check the voicemail frequently throughout the day.     If you had a positive experience please indicate that on your patient satisfaction survey form that [facility] will be sending you.    It was a pleasure meeting with you today.  Thank you for allowing me and my team the privilege of caring for you today.  YOU are the reason we are here, and I truly hope we provided you with the excellent service you deserve.  Please let us know if there is anything else we can do for you so that we can be sure you are leaving completely satisfied with your care experience.      If you have any billing related questions please call the [facility] Business office at [phone]. The clinic staff does not handle billing related matters.    If you are scheduled to have a follow up appointment, you will receive a reminder call the day before your visit. On the appointment day please arrive 15 minutes prior to your appointment time. If you are unable to keep that appointment, please call the clinic to cancel or reschedule. If you are more than 10 minutes late or greater for your scheduled appointment time, the clinic policy is that you may be asked to reschedule.          Electronically signed by Rosalina Estrada CNP on January 19, 2024

## 2024-01-23 NOTE — ADDENDUM NOTE
Encounter addended by: Aidee Acosta RN on: 1/23/2024 8:48 AM   Actions taken: Charge Capture section accepted

## 2024-01-23 NOTE — TELEPHONE ENCOUNTER
Spoke with patient regarding the response from [facility] regarding supply order on 1/4/24. [facility] unable to dispense due to package being returned and homecare starting the the same date package was shipped. Patient would like writer to call home care nurseRajni to discuss.     Patient also stated that he believes his hemoglobin is dropping as he feels worse and more tired than when his hemoglobin was at 7.1. Writer stated that patient should go in an be evaluated. Patient stated that he will be contacting his neighbor to drive him and will be contacting his PCP.       Writer left voicemail for [facility] to speak with nurse Urban when able.

## 2024-01-24 PROBLEM — A41.9 SEPSIS, DUE TO UNSPECIFIED ORGANISM, UNSPECIFIED WHETHER ACUTE ORGAN DYSFUNCTION PRESENT (H): Status: ACTIVE | Noted: 2024-01-01

## 2024-01-24 PROBLEM — M86.9 OSTEOMYELITIS OF RIGHT FOOT, UNSPECIFIED TYPE (H): Status: ACTIVE | Noted: 2024-01-01

## 2024-01-24 NOTE — TELEPHONE ENCOUNTER
"Rajni from [facility] called again to the clinic 1/24/24 and left a vm stating that she did see the patient and he had not gone in yet at the time of her visit. She reports that the second right toe \"looks bad\" and specifically mentioned that it's dark in color, boggy, swollen with smelly drainage. She also reports that the patient has not yet started his antibiotic. According to Rajni, the patient will be seeing his PCP today 1/24/24 at 2pm. Rajni did relay her concerns to the patient's primary and stressed the need for him to at least start his antibiotic immediately.     If needed, Rajni can be reached at [phone]  "

## 2024-01-24 NOTE — ED TRIAGE NOTES
Pt comes in for R 2nd toe infection. Hx toe amputations. Also states he's been feeling weak for last 2-3 months and thinks his hgb is low. Hx anemia and has had to have blood transfusions before      Triage Assessment (Adult)       Row Name 01/24/24 1854          Triage Assessment    Airway WDL WDL        Respiratory WDL    Respiratory WDL WDL        Skin Circulation/Temperature WDL    Skin Circulation/Temperature WDL WDL        Cardiac WDL    Cardiac WDL WDL        Peripheral/Neurovascular WDL    Peripheral Neurovascular WDL WDL        Cognitive/Neuro/Behavioral WDL    Cognitive/Neuro/Behavioral WDL WDL

## 2024-01-24 NOTE — ED PROVIDER NOTES
"  History     Chief Complaint:  Generalized Weakness and Skin Ulcer       HPI   Fer Myles is a 63 year old male with a history of HTN, Paroxysmal atrial Fib, who presents to the ED for toe pain. Matias states his RN came to his home today at noon for a check-up and noticed an infection on his right 2nd toe. Adds that he has a sore on the side of his right leg but says it comes and goes. Matias reports his RN has been addressing his leg pain for the last month. Denies any symptoms on  his legs. Matias adds that he's been feeling weak for the last 2-3 months and thinks his hemoglobin is low. A month ago, he noticed his hemoglobin leverls were 7.1. Was able to get it up to 9.2, but feels like he's \"losing lots of blood\". With this, he has been passing diarrhea and will feel weak after.     Independent Historian:   None - Patient Only    Review of External Notes:   I reviewed a 1/23/2024 wound clinic note      Medications:    Allopurinol  Apixaban  Balsalazide  cephalexin  ferrous sulfate  Furosemide  Lisinopril  metoprolol succinate  Pantoprazole  Pioglitazone  Sertraline    Past Medical History:    Alcohol abuse  Cataract  Depression  Gastroesophageal reflux disease with esophagitis  gastric bypass  Hypertension  HAKEEM  Atrial fib  Subdural hematoma   Type II diabetes  Ulcerative colitis     Past Surgical History:    Gastric bypass   EGD   [place] teeth extraction   Hand/finger procedure   Subdural hematoma      Physical Exam   Patient Vitals for the past 24 hrs:   BP Temp Temp src Pulse Resp SpO2   01/24/24 1635 119/70 97.5  F (36.4  C) Temporal 64 18 100 %      Physical Exam  General: Alert, No distress. Nontoxic appearance  Head: No signs of trauma.   Mouth/Throat: Oropharynx moist.   Eyes: Conjunctivae are normal. Pupils are equal..   Neck: Normal range of motion.    CV: Appears well perfused.  Resp:No respiratory distress.   MSK: Normal range of motion. No obvious deformity.   Neuro: The patient is alert and " interactive. VELEZ. Speech normal. GCS 15  Skin: No lesion or sign of trauma noted.  Ulceration to the lateral lower right leg.  Ulceration of the skin over the right second toe.  See pictures below  Psych: normal mood and affect. behavior is normal.                       Emergency Department Course   ECG    Imaging:  XR Toe Right G/E 2 Views   Final Result   IMPRESSION: Diffuse osseous demineralization. Prior partial amputation of the first toe through the neck of the first proximal phalanx. Soft tissue wound at the distal tip of the second digit with focal demineralization and probable cortical bone loss    involving the tuft of the distal phalanx suggestive of osteomyelitis.       Mild to moderate degenerative arthrosis involving multiple joints of the midfoot and forefoot. No definite fracture, however, degree of osseous demineralization limits evaluation for nondisplaced fracture.      NOTE: ABNORMAL REPORT      THE DICTATION ABOVE DESCRIBES AN ABNORMALITY FOR WHICH FOLLOW-UP IS NEEDED.          Laboratory:  Labs Ordered and Resulted from Time of ED Arrival to Time of ED Departure   COMPREHENSIVE METABOLIC PANEL - Abnormal       Result Value    Sodium 135      Potassium 3.6      Carbon Dioxide (CO2) 30 (*)     Anion Gap 8      Urea Nitrogen 9.8      Creatinine 0.83      GFR Estimate >90      Calcium 8.0 (*)     Chloride 97 (*)     Glucose 139 (*)     Alkaline Phosphatase 143      AST 17      ALT 5      Protein Total 6.7      Albumin 2.9 (*)     Bilirubin Total 0.3     CBC WITH PLATELETS AND DIFFERENTIAL - Abnormal    WBC Count 6.8      RBC Count 2.72 (*)     Hemoglobin 9.6 (*)     Hematocrit 29.2 (*)      (*)     MCH 35.3 (*)     MCHC 32.9      RDW 21.6 (*)     Platelet Count 188      % Neutrophils 78      % Lymphocytes 12      % Monocytes 8      % Eosinophils 2      % Basophils 0      % Immature Granulocytes 0      NRBCs per 100 WBC 0      Absolute Neutrophils 5.3      Absolute Lymphocytes 0.8       Absolute Monocytes 0.5      Absolute Eosinophils 0.1      Absolute Basophils 0.0      Absolute Immature Granulocytes 0.0      Absolute NRBCs 0.0     LACTIC ACID WHOLE BLOOD - Abnormal    Lactic Acid 2.1 (*)    BLOOD CULTURE   BLOOD CULTURE          Emergency Department Course & Assessments:       Interventions:  Medications   ceFEPIme (MAXIPIME) 2 g vial to attach to  mL bag for ADULTS or 50 mL bag for PEDS (has no administration in time range)   metroNIDAZOLE (FLAGYL) infusion 500 mg (has no administration in time range)      Assessments:  1649 I obtained history and examined the patient as noted above.     Independent Interpretation (X-rays, CTs, rhythm strip):  X-ray of the right foot shows evidence of right second toe osteomyelitis    Consultations/Discussion of Management or Tests:  I spoke with Dr. Kerr of the hospitalist service regarding admission.      Social Determinants of Health affecting care:   Stress/Adjustment Disorders    Disposition:  The patient was admitted to the hospital under the care of Dr. Kerr.     Impression & Plan    CMS Diagnoses: The Lactic acid level is elevated due to Infection, at this time there is no sign of severe sepsis or septic shock. and None    Medical Decision Making:  This patient is presenting to the ER after being referred by his wound care nurse.  The patient has worsening infection of the second toe on the right foot.  See pictures above.  The patient has an elevated lactic acid which is likely secondary to his infection.  His blood pressure is normal.  Blood cultures were drawn and broad-spectrum antibiotics were given.  I consulted with Dr. Kerr from the hospitalist service who is willing to admit the patient for further evaluation and treatment.      Diagnosis:    ICD-10-CM    1. Osteomyelitis of right foot, unspecified type (H)  M86.9       2. Sepsis, due to unspecified organism, unspecified whether acute organ dysfunction present (H)  A41.9             Scribe Disclosure:  I, Dodie Kerr, am serving as a scribe at 5:22 PM on 1/24/2024 to document services personally performed by Terry Reynolds MD based on my observations and the provider's statements to me.   1/24/2024     Terry Reynolds MD Joing, Todd Roger, MD  01/25/24 0338

## 2024-01-24 NOTE — TELEPHONE ENCOUNTER
Writer spoke with home care nurse Rajni and updated her on the previous supply order status. Rajni is able to order supplies through [facility] and placed on order this past Monday. Also discussed patients symptoms when he called yesterday. Rajni stated that she will call him today before she goes out to see him and encourage him to go in to be evaluated if symptoms are continuing. Rajni stated that she has encouraged him in the past to go to urgent care or the ED but patient does not always follow through. Rajni stated that the 2nd toe had looked worse when she saw him on Monday and that is consistent with how it appeared on 1/19's visit in the clinic. MRI of the foot is scheduled for 2/8 and has follow up with Rosalina Estrada on on 2/2.

## 2024-01-25 NOTE — PLAN OF CARE
Date & Time: 1/25/24 7976-8001    Surgery/POD#: Here for R 2nd toe wound infection    Behavior & Aggression: GREEN  Fall Risk: yes  Orientation: A&Ox4   ABNL VS/O2: VSS ex HTN on CPAP overnight  ABNL Labs: hgb 9.6, lactic 2.1, Hgb A1C  Pain Management: Denies  Bowel/Bladder: Voids in the urinal, no BM this shift  Diet: NPO at midnight  Activity Level: Ind  Tests/Procedures: Podiatry, ID, Lymphedema therapy, WOC consult today  Anticipated DC Date: Pending improvement  Significant Information: R 2nd toe wound CDI, R calf wound CDI. MRI completed.
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
## 2024-01-25 NOTE — PROGRESS NOTES
[facility]  Hospitalist Progress Note   01/25/2024          Assessment and Plan:       Fer Myles is a 63 year old male with paroxysmal atrial fibrillation, type 2 diabetes mellitus, alcohol use disorder, hypertension, obesity, GI bleeding, and osteomyelitis of the great right toe admitted on 1/24/2024 for worsening toe wound.    Right second digit osteomyelitis.  Right forefoot cellulitis.  Lactic acidosis cleared   Chronic ulcer right second toe.  Chronic venous stasis edema.  History of right great toe amputation for osteomyelitis 10/2023.  --Presented with worsening to wound.    --Afebrile.  WBC count within normal limits. .53. Lactic acid 2.1 > 0.9  --X-ray right foot soft tissue wound at the distal tip of the second digit with focal demineralization and probable cortical bone loss involving the tuft of the distal phalanx suggestive of osteomyelitis.  - MRI with Osteomyelitis with cortical destruction and abnormal confluent decreased T1 signal involving the mid and distal phalanges of the right second toe.  See report for details.  -US ANA MARIA -. Some limitations of exam given calcified noncompressible arteries and unable to obtain TBI values in the right lower extremity.  Waveforms and right ANA MARIA values appear relatively intact. Unable to obtain resting ANA MARIA value of the left lower extremity due to noncompressible arteries. TBI is intact, as are waveforms.  -- Continue IV cefepime [1/24]  Follow proBNP.  Trend WBC count, CRP.  --Holding prior to admission anticoagulation for possible surgery.  -- Podiatry consulted.  Plan for possible surgery.  Appreciate input.  --Vascular surgery consult requested.  Lymphedema consult requested.  Wound care team evaluation.  PT evaluation.    Type 2 diabetes mellitus with hemoglobin A1c 4.8 in 1/24/2024  Will be n.p.o. therefore holding PTA Jardiance and pioglitazone  Correction scale insulin.  Monitor blood sugars and optimize regimen.      HTN  Continue PTA lisinopril, Lasix, potassium     Paroxysmal Afib on Eliquis  Hold PTA apixaban in the context of anticipated surgery  Continue PTA metoprolol                     Depression  Continue PTA Zoloft      GERD  Continue PTA Protonix      Chronic macrocytic anemia, stable   H/o Iron deficiency with h/o chronic GI bleeding associated with UC  Folic acid deficiency.  WBC 6.8, hemoglobin 9.6 (stable) ,   Hemoglobin stable at 10.  Follow vitamin B12 levels.  Folic acid 2.1.  Will start on folic acid supplements 1 mg twice daily.  Monitor folic acid levels in 10 weeks      Ulcerative colitis  Chronic diarrhea,   *He states for the last several months he has had over 10 loose bowel movements a day.  He was seen by his gastroenterologist who stated a stool sample was done and he was told this was not due to ulcerative colitis.  There has been no recent changes in his medications.  He denies any abdominal pain or cramping with this no black or bloody stools  Continue home Colazal   Declined [facility] consult.  He states he will do this as an outpatient  Loperamide ordered as needed  Recommended avoiding dairy products     Gout  Continue home Allopurinol      HAKEEM  Continue home CPAP     HAKEEM with a BMI of 32.62.  Consider lifestyle modification with diet and exercise as able to.    Physical deconditioning from medical illness.  Patient reports lives alone at home.  PT evaluation.  Care management assistance with transition       Orders Placed This Encounter      NPO per Anesthesia Guidelines for Procedure/Surgery Except for: Meds      Combination Diet Regular Diet Adult; Moderate Consistent Carb (60 g CHO per Meal) Diet      DVT Prophylaxis: CDs.  Holding pharmacological DVT prophylaxis for surgery.  Code Status: Full Code  Disposition: Expected discharge in 2 days pending clinical improvement.    Discussed with patient, bedside RN  >51 minutes spent by me on the date of service doing chart  review, history, exam, documentation & further activities per the note.      Eduin Saeed MD        Interval History:        Patient lying in bed.  Complaining of generalized weakness.  Denies any headache or dizziness.  Denies any new tingling or numbness.  Denies any nausea or vomiting.  Has been n.p.o. this morning.  Podiatry planning for possible surgery today or tomorrow.  Denies any shortness of breath.  No active bleeding.  No abdominal pain.  No fever or chills.         Physical Exam:        Physical Exam   Temp:  [97.5  F (36.4  C)-98.3  F (36.8  C)] 98.1  F (36.7  C)  Pulse:  [63-72] 68  Resp:  [18] 18  BP: (102-145)/(63-77) 145/77  SpO2:  [99 %-100 %] 99 %    Intake/Output Summary (Last 24 hours) at 1/25/2024 0814  Last data filed at 1/24/2024 1906  Gross per 24 hour   Intake 100 ml   Output --   Net 100 ml       Admission Weight: 105.7 kg (233 lb)  Current Weight: 106.1 kg (233 lb 14.5 oz)    PHYSICAL EXAM  GENERAL: Patient is in no distress. Alert and oriented.  HEART: Regular rate and rhythm. S1S2.   LUNGS: Clear to auscultation bilaterally. No expiratory wheeze.  Respirations unlabored  NEURO: Moving all extremities.  EXTREMITIES: 2+ pitting edema.  Stasis changes.  Right second toe wound clean dry.  Right foot erythema present.  SKIN: Warm, dry.  PSYCHIATRY Cooperative       Medications:         allopurinol  200 mg Oral Daily    balsalazide  4,500 mg Oral Daily    ceFEPIme  2 g Intravenous Q8H    furosemide  20 mg Oral Daily    insulin aspart  1-6 Units Subcutaneous Q4H    lisinopril  20 mg Oral Daily    metoprolol succinate ER  25 mg Oral Daily    pantoprazole  40 mg Oral Daily    potassium chloride ER  10 mEq Oral Daily    sertraline  150 mg Oral Daily    sodium chloride (PF)  3 mL Intracatheter Q8H     acetaminophen **OR** acetaminophen, sore throat, calcium carbonate, glucose **OR** dextrose **OR** glucagon, diphenhydrAMINE (BENADRYL) 50 mg, acetaminophen (TYLENOL) 1,000 mg alternative for  Tylenol PM, lidocaine 4%, lidocaine (buffered or not buffered), loperamide, menthol-zinc oxide, ondansetron **OR** ondansetron, polyethylene glycol, prochlorperazine **OR** prochlorperazine **OR** prochlorperazine, senna-docusate **OR** senna-docusate, sodium chloride (PF)         Data:      All new lab and imaging data was reviewed.
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
## 2024-01-25 NOTE — ED NOTES
[facility]  ED Nurse Handoff Report    ED Chief complaint: Generalized Weakness and Skin Ulcer      ED Diagnosis:   Final diagnoses:   Osteomyelitis of right foot, unspecified type (H)   Sepsis, due to unspecified organism, unspecified whether acute organ dysfunction present (H)       Code Status: Full Code    Allergies:   Allergies   Allergen Reactions    Amoxicillin Rash       Patient Story: Pt from home where his home health nurse felt his right 2nd toe infection was worsening  Focused Assessment:  Pt is alert and oriented, pleasant and cooperative. Denies pain. History of previous toe amputations.     Treatments and/or interventions provided: Labs. Xray, medications, monitoring  Patient's response to treatments and/or interventions: Tolerated well  Labs Ordered and Resulted from Time of ED Arrival to Time of ED Departure   COMPREHENSIVE METABOLIC PANEL - Abnormal       Result Value    Sodium 135      Potassium 3.6      Carbon Dioxide (CO2) 30 (*)     Anion Gap 8      Urea Nitrogen 9.8      Creatinine 0.83      GFR Estimate >90      Calcium 8.0 (*)     Chloride 97 (*)     Glucose 139 (*)     Alkaline Phosphatase 143      AST 17      ALT 5      Protein Total 6.7      Albumin 2.9 (*)     Bilirubin Total 0.3     CBC WITH PLATELETS AND DIFFERENTIAL - Abnormal    WBC Count 6.8      RBC Count 2.72 (*)     Hemoglobin 9.6 (*)     Hematocrit 29.2 (*)      (*)     MCH 35.3 (*)     MCHC 32.9      RDW 21.6 (*)     Platelet Count 188      % Neutrophils 78      % Lymphocytes 12      % Monocytes 8      % Eosinophils 2      % Basophils 0      % Immature Granulocytes 0      NRBCs per 100 WBC 0      Absolute Neutrophils 5.3      Absolute Lymphocytes 0.8      Absolute Monocytes 0.5      Absolute Eosinophils 0.1      Absolute Basophils 0.0      Absolute Immature Granulocytes 0.0      Absolute NRBCs 0.0     LACTIC ACID WHOLE BLOOD - Abnormal    Lactic Acid 2.1 (*)    LACTIC ACID WHOLE BLOOD   BLOOD CULTURE    BLOOD CULTURE     XR Toe Right G/E 2 Views   Final Result   IMPRESSION: Diffuse osseous demineralization. Prior partial amputation of the first toe through the neck of the first proximal phalanx. Soft tissue wound at the distal tip of the second digit with focal demineralization and probable cortical bone loss    involving the tuft of the distal phalanx suggestive of osteomyelitis.       Mild to moderate degenerative arthrosis involving multiple joints of the midfoot and forefoot. No definite fracture, however, degree of osseous demineralization limits evaluation for nondisplaced fracture.      NOTE: ABNORMAL REPORT      THE DICTATION ABOVE DESCRIBES AN ABNORMALITY FOR WHICH FOLLOW-UP IS NEEDED.             To be done/followed up on inpatient unit:  Continue plan of care    Does this patient have any cognitive concerns?:  none noted    Activity level - Baseline/Home:  Independent  Activity Level - Current:   Independent    Patient's Preferred language: English   Needed?: No    Isolation: None  Infection: Not Applicable  Patient tested for COVID 19 prior to admission: NO  Bariatric?: No    Vital Signs:   Vitals:    01/24/24 1635   BP: 119/70   Pulse: 64   Resp: 18   Temp: 97.5  F (36.4  C)   TempSrc: Temporal   SpO2: 100%       Cardiac Rhythm:     Was the PSS-3 completed:   Yes  What interventions are required if any?               Family Comments:  Not present  OBS brochure/video discussed/provided to patient/family: No              Name of person given brochure if not patient: n/a              Relationship to patient: n/a    For the majority of the shift this patient's behavior was Green.   Behavioral interventions performed were Rounding.    ED NURSE PHONE NUMBER: ED RN 1

## 2024-01-25 NOTE — PLAN OF CARE
Date & Time: 1/25/24 9419-8789    Surgery/POD#: Here for R 2nd toe wound infection    Behavior & Aggression: GREEN  Fall Risk: yes  Orientation: A&Ox4   ABNL VS/O2: VSS ex HTN- CPAP at night.  ABNL Labs: Morning labs pending. hgb 9.6, lactic 2.1  Pain Management: Denies  Bowel/Bladder: Voids in the urinal, no BM this shift  Diet: NPO since midnight ex. Ice chips  Activity Level: Ind  Tests/Procedures: Podiatry, ID, Lymphedema therapy, WOC consult today  Anticipated DC Date: Pending improvement  Significant Information: R 2nd toe wound changed by podiatry this morning, R calf wound CDI.  Getting scheduled for toe amputation this afternoon depending on OR availability.
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
## 2024-01-25 NOTE — PROGRESS NOTES
"CLINICAL NUTRITION SERVICES  -  ASSESSMENT NOTE      Recommendations Ordered by Registered Dietitian (RD):   Ordered BID orange Kendell to start post-op 1/26    RD introduced self to pt, discussed role in care. Reviewed current diet order for upcoming procedure. Provider to advance diet post-op. Offered oral protein supplements to provide additional kcal/protein for healing and repletion considering difficulty chewing. Pt declined. Prefers to try to eat foods still. Encouraged protein intake for wound healing, repletion. Would like to try wound healing supplement for post-op healing. Ok w/ BID Kendell. Reviewed benefits of Kendell on wound healing.      Malnutrition:   % Weight Loss:  Weight loss does not meet criteria for malnutrition-- 14% w/in 1 year  % Intake:  Decreased intake does not meet criteria for malnutrition  Subcutaneous Fat Loss:  Orbital region mild depletion and Upper arm region mild depletion  Muscle Loss:  Patellar region mild depletion  Fluid Retention:  Trace to mild BLE edema     Malnutrition Diagnosis: Patient does not meet two of the established criteria necessary for diagnosing malnutrition         REASON FOR ASSESSMENT  Fer Myles is a 63 year old male seen by Registered Dietitian for Admission Nutrition Risk Screen for positive. Recent wt loss of >34# w/ no decreased appetite reported. Comment: \"new dentures\"      NUTRITION HISTORY  Information obtained from chart review and d/w pt     PMH of HTN, Paroxysmal atrial Fib (on Eliquis), T2DM, alcohol use disorder, hx of GIB, and , Ulcerative colitis, Chronic diarrhea, gout, Chronic ulcer of the right leg, Chronic venous stasis edema, hx of osteomyelitis of the great right toe s/p amputation (10/2023)    Admitted for: worsening chronic ulcer of the right 2nd toe w/ osteomyelitis and cellulitis     Pt reported he has been losing wt over past 1 1/2 years d/t his dentures being a \"lousy fit.\" He has trouble eating d/t this. He reported seeking help " "for this but has yet to find relief. Endorsed wt loss. -270#. Now weighs 233#. Endorsed noticing muscle loss. Reported a new \"diarrhea siltation\" d/t antibiotics for his toe infection.       CURRENT NUTRITION ORDERS  Diet: NPO per Anesthesia Guidelines for Procedure/Surgery Except for: Meds    Current Intake/Tolerance:  NPO since MN for potential operation today   Previously mod CHO diet.   Reviewed current diet order for upcoming procedure. Provider to advance diet post-op. Offered oral protein supplements to provide additional kcal/protein for healing and repletion considering difficulty chewing. Pt declined. Prefers to try to eat foods still. Encouraged protein intake for wound healing, repletion. Would like to try wound healing supplement for post-op healing. Ok w/ BID Kendell. Reviewed benefits of Kendell on wound healing.   No meals ordered this admission per health touch. No intakes documented this admission per nursing flow sheet.        NUTRITION FOCUSED PHYSICAL ASSESSMENT FOR DIAGNOSING MALNUTRITION)  Yes           Observed:    Muscle wasting (refer to documentation in Malnutrition section)   Subcutaneous fat loss (refer to documentation in Malnutrition section)    Obtained from Chart/Interdisciplinary Team:  1/25: ?Right foot second digit amputation     ANTHROPOMETRICS  Height: 5' 11\"  Weight: 233 lbs 14.53 oz  Body mass index is 32.62 kg/m .  Weight Status:  Obesity Grade I BMI 30-34.9  IBW: 78.2 kg  % IBW: 134%  Weight History:   Wt loss of 17.7 kg over past 1 year (14%)  01/25/24 106.1 kg (233 lb 14.5 oz)   01/04/24 119.7 kg (264 lb)-- suspect self-reported   12/06/23 111.1 kg (245 lb)   11/02/23 111.1 kg (245 lb)   10/19/23 111.1 kg (245 lb)   01/03/23 123.8 kg (273 lb)   10/31/22 122.5 kg (270 lb)   02/18/22 130 kg (286 lb 9.6 oz)   12/29/21 128.4 kg (283 lb)   07/14/21 130.7 kg (288 lb 1.6 oz)     No recent wt hx per care everywhere        LABS  CRP Inflammation   Date Value Ref Range Status "   01/24/2024 192.53 (H) <5.00 mg/L Final     Hemoglobin A1C   Date Value Ref Range Status   01/24/2024 4.8 <5.7 % Final     Lab 01/25/24  0837 01/25/24  0433 01/24/24  2354 01/24/24  1644   GLC 92 89 107* 139*        MEDICATIONS   furosemide  20 mg Oral Daily    insulin aspart (MSSI)  1-6 Units Subcutaneous Q4H    pantoprazole  40 mg Oral Daily    potassium chloride ER  10 mEq Oral Daily    sertraline  150 mg Oral Daily         ASSESSED NUTRITION NEEDS PER APPROVED PRACTICE GUIDELINES:  Dosing Weight: 85.2 kg (adjusted, based on 106.1 kg-- 1/25)  Estimated Energy Needs: 4677-7025 kcal (20-25 Kcal/Kg)  Justification: maintenance w/ BMI in mind  Estimated Protein Needs: 102-128 grams protein (1.2-1.5 g pro/Kg)  Justification: wound healing  Estimated Fluid Needs: 1 mL/kcal  Justification: maintenance       MALNUTRITION:  % Weight Loss:  Weight loss does not meet criteria for malnutrition-- 14% w/in 1 year  % Intake:  Decreased intake does not meet criteria for malnutrition  Subcutaneous Fat Loss:  Orbital region mild depletion and Upper arm region mild depletion  Muscle Loss:  Patellar region mild depletion  Fluid Retention:  Trace to mild BLE edema     Malnutrition Diagnosis: Patient does not meet two of the established criteria necessary for diagnosing malnutrition        NUTRITION DIAGNOSIS:  Unintended weight loss related to difficulty eating 2/2 misfit dentures as evidenced by 14% wt loss over past 1 year        NUTRITION INTERVENTIONS  Recommendations / Nutrition Prescription  Ordered BID orange Kendell to start post-op 1/26 RD introduced self to pt, discussed role in care. Reviewed current diet order for upcoming procedure. Provider to advance diet post-op. Offered oral protein supplements to provide additional kcal/protein for healing and repletion considering difficulty chewing. Pt declined. Prefers to try to eat foods still. Encouraged protein intake for wound healing, repletion. Would like to try wound  healing supplement for post-op healing. Ok w/ BID Kendell. Reviewed benefits of Kendell on wound healing.       Implementation  Medical food supplement therapy  Nutrition education       Nutrition Goals  Pt to consume >75% TID w/ protein at each meal post-op  Wt >106 kg    MONITORING AND EVALUATION:  Progress towards goals will be monitored and evaluated per protocol and Practice Guidelines      Irene Solorio RD, LD   Pager: [phone]

## 2024-01-25 NOTE — PLAN OF CARE
RECEIVING UNIT ED HANDOFF REVIEW    ED Nurse Handoff Report was reviewed by: Jenifer Mills RN on January 24, 2024 at 9:33 PM

## 2024-01-25 NOTE — CONSULTS
"SPIRITUAL HEALTH SERVICES - Consult Note    [facility]  Gen Surg    Referral Source/Reason for Visit: Assess for community resources based on \"yes\" answer to whether patient has Restorationist/spiritual needs that may affect care    Summary and Recommendations -    Matias is awaiting surgery and expresses a deep desire to go back home and that his life would return to normal.  Matias is well-surrounded by family, friends, and a [employer] community who are providing the support he needs. He declined an offer to reach out to them for additional support during his hospitalization.   Prayer was provided and Matias expressed no additional needs at this time.    Plan: Spiritual Health remains available. Please consult with any additional needs.    Radha Jj M.Div.    Chaplain Resident    [facility] available 24/7 for emergent requests/referrals, either by paging the on-call  or by entering an ASAP/STAT consult in [facility], which will also page the on-call .    Assessment    Saw pt Matias Myles per consult.      Patient/Family Understanding of Illness and Goals of Care -   Matias is currently waiting for surgery (hopefully yet today) to \"remove another toe.\" He noted that he has had an infection and had the first procedure back in October.  Matias's goal is to \"get home and back to normal.\"      Distress and Loss -   Matias said they he retired just a few years ago and hasn't been \"able to enjoy it\" with all his medical concerns.      Strengths, Coping, and Resources -   Matias has a very supportive family who have been helping him during this medical challenge.  Matias enjoys motorcycling and has a group of friends he looks forward to going on trips with again.  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 This has served him well over the years.      Meaning, Beliefs, and Spirituality -   Matias has a [employer] community with many connections to people who are praying for " him. He declined an offer to make a connection with them at this time.  Matias finds meaning in prayer and asked for a prayer for all the medical changes he needs and the return to normalcy he craves. I was able to provide that prayer.

## 2024-01-25 NOTE — CONSULTS
[facility] Nurse Inpatient Assessment     Consulted for: Right toe and right leg    Summary:   Right lateral LE: pt has ulceration that he reports he has had on and off for about 15 years. Pt had prior surgery to this ankle reports he frequently just keeps it open to air, no current s/sx of infection. Large edema and wound is very weepy, he would benefit compression to promote healing.    Right Hallux: Podiatry also consulted and per chart review plan is for amputation   Patient History (according to provider note(s):      Fer Myles is a 63 year old male with history of paroxysmal atrial fibrillation, type 2 diabetes mellitus, alcohol use disorder, hypertension, obesity, GI bleeding, and osteomyelitis of the great right toe admitted on 1/24/2024 for worsening toe wound, no acute symptoms of infection.  Denies any fevers, chills or acute malaise.  Does note he has been feeling weak for the last 2 to 3 months and wonders if his hemoglobin is low as he is had some black stools.     Assessment:      Areas visualized during today's visit: Lower extremities     Wound location: Right lateral LE    Last photo: 1/25/24    Wound due to: Venous Ulcer and contributing edema  Wound history/plan of care: Pt reports he has had this wound on and off for about 15 years following surgery. He has large amt of swelling to LE and suspect this is the reason it opens. Patient does have compression socks at home, but reports he hasn't been wearing them. He is open to compression to LE will trial with edemawear. Pt reports dry gauze was placed on the wound about 3 hours ago and dressing is saturated during assessment  Wound base: 80 % granulation tissue, 20 % non-granular tissue appears to have slight film over wound      Palpation of the wound bed: normal      Drainage: copious     Description of drainage: serosanguinous     Measurements (length x width x depth, in cm): 3.5  x 3.3  x  0.2 cm       Tunneling: N/A     Undermining: N/A  Periwound skin: Dry/scaly, Edematous, Hemosiderin staining, and small scabs      Color: normal and consistent with surrounding tissue      Temperature: normal   Odor: none  Pain: denies , none  Pain interventions prior to dressing change: N/A  Treatment goal: Heal  and Decrease moisture  STATUS: initial assessment  Supplies ordered: gathered and at bedside       Treatment Plan:     Right lateral LE wound(s): Daily  and PRN saturation  1. Cleanse wound with Vashe #([id number]) soaked gauze. Soak gauze and allow to sit in wound bed for 5 minutes then remove.  2. Cut a piece of Aquacel AG ( #[id number]) and apply to wound bed  3. Apply Cavilon No Sting Skin Prep (#153940) to periwound and allow to dry.  4.  Cover wound with foam adhesive such as optifoam or mepilex  5. Time and date dressing change  6. Apply Edemawear (#[id number] yellow stripe) From base of toes to below knees to BL lower extremities.     Orders: Written    RECOMMEND PRIMARY TEAM ORDER: None, at this time  Education provided: plan of care, Infection prevention , and Off-loading pressure  Discussed plan of care with: Patient  WOC nurse follow-up plan: weekly  Notify WOC if wound(s) deteriorate.  Nursing to notify the Provider(s) and re-consult the WOC Nurse if new skin concern.    DATA:     Current support surface: Standard  Standard gel/foam mattress (IsoFlex, Atmos air, etc)  Containment of urine/stool: Continent of bladder and Continent of bowel  BMI: Body mass index is 32.62 kg/m .   Active diet order: Orders Placed This Encounter      NPO per Anesthesia Guidelines for Procedure/Surgery Except for: Meds      Combination Diet Regular Diet Adult; Moderate Consistent Carb (60 g CHO per Meal) Diet     Output: I/O last 3 completed shifts:  In: 100 [IV Piggyback:100]  Out: -      Labs:   Recent Labs   Lab 01/24/24  1644   ALBUMIN 2.9*   HGB 9.6*   WBC 6.8   A1C 4.8     Pressure injury risk assessment:   Sensory Perception:  3-->slightly limited  Moisture: 3-->occasionally moist  Activity: 3-->walks occasionally  Mobility: 3-->slightly limited  Nutrition: 3-->adequate  Friction and Shear: 2-->potential problem  Zeyad Score: 17    Denis Stone RN CWOCN  -Securely message with "[employer] (more info) - can reach individually by name or search 'WOC Nurse' (Sravanthi) to reach all current WOCs on duty.  WOC Office Phone: [phone]

## 2024-01-25 NOTE — PROGRESS NOTES
"Care Management Follow Up    Length of Stay (days): 1    Expected Discharge Date: 01/29/2024     Concerns to be Addressed:       Patient plan of care discussed at interdisciplinary rounds: Yes    Anticipated Discharge Disposition:       Anticipated Discharge Services:    Anticipated Discharge DME:      Patient/family educated on Medicare website which has current facility and service quality ratings:    Education Provided on the Discharge Plan:    Patient/Family in Agreement with the Plan:      Referrals Placed by CM/SW:    Private pay costs discussed: Not applicable    Additional Information:  Writer received a call from Tahmina with [facility] letting staff know that they are providing services to this patient, currently he has a nurse that sees him. If patient discharges we can send orders with \"resumption of home care\" and/or add any other disciplines needed. Discharge orders can be sent to  and  Tahmina can be reached at      KESHA Ling    "

## 2024-01-25 NOTE — PHARMACY-ADMISSION MEDICATION HISTORY
Pharmacist Admission Medication History    Admission medication history is complete. The information provided in this note is only as accurate as the sources available at the time of the update.    Information Source(s): patient and sure scripts   Changes made to PTA medication list:  Added: None  Deleted: None  Changed: None    Medication Affordability:       Allergies reviewed with patient and updates made in EHR: no    Medication History Completed By: Tosin Connell RPH 1/24/2024 8:52 PM    PTA Med List   Medication Sig Last Dose    allopurinol (ZYLOPRIM) 100 MG tablet Take 200 mg by mouth daily 1/24/2024    apixaban ANTICOAGULANT (ELIQUIS) 5 MG tablet Take 1 tablet (5 mg) by mouth 2 times daily 1/24/2024 at x2    balsalazide (COLAZAL) 750 MG capsule Take 4,500 mg by mouth daily 1/24/2024    Calcium Carbonate Antacid (MATHEUS-SELTZER HEARTBURN PO) Take by mouth as needed     diphenhydrAMINE-acetaminophen (TYLENOL PM)  MG tablet Take 3 tablets by mouth at bedtime     ferrous sulfate (FE TABS) 325 (65 Fe) MG EC tablet Take 325 mg by mouth daily 1/24/2024    furosemide (LASIX) 20 MG tablet Take 20 mg by mouth daily 1/24/2024    JARDIANCE 25 MG TABS tablet Take 25 mg by mouth daily 1/24/2024    lisinopril (ZESTRIL) 20 MG tablet Take 20 mg by mouth daily 1/24/2024    metoprolol succinate ER (TOPROL XL) 25 MG 24 hr tablet Take 25 mg by mouth daily 1/24/2024    omega 3 1000 MG CAPS Take 1,000 mg by mouth daily 1/24/2024    pantoprazole (PROTONIX) 40 MG EC tablet Take 40 mg by mouth daily 1/24/2024    pioglitazone (ACTOS) 30 MG tablet Take 30 mg by mouth daily 1/24/2024    potassium chloride haja er (K-DUR) Take 10 mEq by mouth daily 1/24/2024    sertraline (ZOLOFT) 100 MG tablet Take 150 mg by mouth daily 1/24/2024     Tosin MayberryD

## 2024-01-26 NOTE — PLAN OF CARE
Date & Time: 1/25/24 6728-3742     Surgery/POD#: Here for R 2nd toe wound infection     Behavior & Aggression: GREEN  Fall Risk: yes  Orientation: A&Ox4   ABNL VS/O2: VSS ex HTN on CPAP overnight  ABNL Labs: folate 2.1  Pain Management: Denies  Bowel/Bladder: Voids in the urinal, no BM this shift  Diet: NPO at midnight  Activity Level: Ind  Tests/Procedures: Plan for 2nd toe amputation at 0730 today, vascular consult & lymphedema therapy consult   Anticipated DC Date: Pending improvement  Significant Information: R 2nd toe wound CDI, R calf wound CDI - wound care orders in place. Fady wipes completed and pt went down for surgery

## 2024-01-26 NOTE — PROGRESS NOTES
Patient has a DH2 shoe from his last operation. Patient has declined anew DH2 shoe at this time.  Patient DH2 shoe is in the room.   KENNA Salazar.

## 2024-01-26 NOTE — ANESTHESIA POSTPROCEDURE EVALUATION
Patient: Fer Myles    Procedure: Procedure(s):  Right foot second digit amputation       Anesthesia Type:  MAC    Note:  Disposition: Inpatient   Postop Pain Control: Uneventful            Sign Out: Well controlled pain   PONV: No   Neuro/Psych: Uneventful            Sign Out: Acceptable/Baseline neuro status   Airway/Respiratory: Uneventful            Sign Out: Acceptable/Baseline resp. status   CV/Hemodynamics: Uneventful            Sign Out: Acceptable CV status   Other NRE: NONE   DID A NON-ROUTINE EVENT OCCUR? No           Last vitals:  Vitals Value Taken Time   /81 01/26/24 0900   Temp 36.1  C (96.9  F) 01/26/24 0823   Pulse 56 01/26/24 0902   Resp 10 01/26/24 0902   SpO2 99 % 01/26/24 0902   Vitals shown include unfiled device data.    Electronically Signed By: Kyle Kumar MD  January 26, 2024  4:09 PM

## 2024-01-26 NOTE — OP NOTE
PREOPERATIVE DIAGNOSES:     1.  Right foot second digit osteomyelitis        POSTOPERATIVE DIAGNOSES:   1.  Right foot second digit osteomyelitis        PROCEDURE:     1.  Right foot second digit amputation       ANESTHESIA:  MAC with local.       HEMOSTASIS:  Electrocautery.       ESTIMATED BLOOD LOSS:  10 mL.       SPECIMENS:  Soft tissue culture, digit for pathology       MATERIALS:  None     Indications: Fer Myles  has an ulcer and active infection to the right foot second digit. Xray and MRI confirm osteomyelitis to the site. Given this, an amputation was recommended. Procedure was discussed with patient at length, including all risks and benefits. Patient agrees to planned procedure.     Procedure: Under mild sedation pt was brought into the OR & placed on the operating table in a supine position. A total of 20 cc of .5% marcaine were injected into the base of the second digit. The foot was scrubbed, prepped and draped in an aseptic manner.  A racquet-shaped incision with slightly more plantar skin than dorsal skin was made to the dorsal forefoot @ the level of the 2nd MPJ. The incision was started dorsally and carried on plantarly to fully encompass the digit.  The incision was deepened through subcutaneous tissue with care being taken to identify and retract all vital neural and vascular structures. A towel clamp was attached to the distal toe to assist in the removal of the phalanges from the metatarsal. All bleeders were ligated and cauterized as necessary.      All resected bone was sent to pathology.  All nonviable soft tissue was resected  insuring no necrotic tissue proximal to the amputation remained. Next copious amounts of saline were used to flush the amputation site. Site was then closed with 3.0 prolene suture.  Upon completion of suturing, a non-adhesive sterile dressing was then placed over the surgical site followed by 4 x 4s, kerlix, & an ACE wrap. Cultures were taken consisting of   deep soft tissue    The pt tolerated the procedure & anesthesia well.  He was transferred to the recovery room with vital signs stable and vascular status intact to the right foot.     Keep dressing clean, dry and intact.  Do not remove dressing.  Elevate  foot at rest.  Continue IV antibiotics  Contact Dr. Diallo if any post-op questions or arrise.     Intraop findings: Significant necrosis to site. Following debridement, tissue viable. Adequate bleeding for procedure.     Post op plan: Recommend cont IV abx for approx 2 days. Oral abx per cultures for discharge.
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
## 2024-01-26 NOTE — PROGRESS NOTES
Pt is Aox4, up independently in room, VSS on RA, denies pain. PIV SL. BG checks, 106 no insulin given. Discharge is pending vascular surgery input.
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
## 2024-01-26 NOTE — PROGRESS NOTES
[facility]  Hospitalist Progress Note   01/26/2024          Assessment and Plan:       Fer Myles is a 63 year old male with paroxysmal atrial fibrillation, type 2 diabetes mellitus, alcohol use disorder, hypertension, obesity, GI bleeding, and osteomyelitis of the great right toe admitted on 1/24/2024 for worsening toe wound.    Status post right second digit amputation 1/26/2024.  Right second digit osteomyelitis.    Right forefoot cellulitis.  Lactic acidosis cleared   Chronic ulcer right second toe.  Chronic venous stasis edema  History of right great toe amputation for osteomyelitis 10/2023.  --Presented with worsening to wound.    --Afebrile.  WBC count within normal limits. .53. Lactic acid 2.1 > 0.9  --X-ray right foot soft tissue wound at the distal tip of the second digit with focal demineralization and probable cortical bone loss involving the tuft of the distal phalanx suggestive of osteomyelitis.  - MRI with Osteomyelitis with cortical destruction and abnormal confluent decreased T1 signal involving the mid and distal phalanges of the right second toe.  See report for details.  -US ANA MARIA -. Some limitations of exam given calcified noncompressible arteries and unable to obtain TBI values in the right lower extremity. Waveforms and right ANA MARIA values appear relatively intact. Unable to obtain resting ANA MARIA value of the left lower extremity due to noncompressible arteries. TBI is intact, as are waveforms.  --Status post right second digit amputation 1/26/2024.  Per report Significant necrosis to site. Following debridement, tissue viable. Adequate bleeding for procedure. Documented EBL 10 mL.    --- Defer postoperative care including rehabilitation to podiatry.   --PTA Eliquis on hold for surgery today.  Will restart prior to admission anticoagulation once okayed by podiatry.  --Continue IV cefepime 1/24.  Trend WBC count, fever curve.  --Follow intraoperative cultures.  Infectious  disease consult for antibiotics requested.  Vascular surgery following, recommend to start aspirin.  Await podiatry input on anticoagulation and antiplatelet therapy.    Appreciate multidisciplinary team input.  Continue PTA Lasix and potassium.  Noted slightly elevated proBNP of 3098.  Today postop day 0 hence will hold off further diuresis.  IV/p.o. as needed pain meds.  Follow lipid panel, will consider statin therapy.  PT, OT evaluation.  Care management assistance with transition.  - Patient might have venous insufficiency given the shin wound with hyperpigmentation around the calf, recommend obtaining reflux study as an outpatient  Lymphedema consult requested.  Elevate foot at rest.  Liberalize fluids today as pt n.p.o. this morning.    Paroxysmal Afib on Eliquis  Hypertension.  Held PTA apixaban, await podiatry input on restarting anticoagulation.  Continue PTA metoprolol.  Continue PTA lisinopril  Continue PTA Lasix and potassium.  Noted slightly elevated proBNP of 3098.  Today postop day 0 hence will hold off further diuresis.  Tele Monitor tonight    Type 2 diabetes mellitus with hemoglobin A1c 4.8 in 1/24/2024  Restart PTA Jardiance and pioglitazone 1/26.  Correction scale insulin.  Monitor blood sugars and optimize regimen.     Chronic macrocytic anemia, stable   H/o Iron deficiency with h/o chronic GI bleeding associated with UC  Folic acid deficiency.  WBC 6.8, hemoglobin 9.6 (stable) ,   Hemoglobin stable at 10 > 9.6.  Monitor in AM.  Folic acid 2.1.  Vitamin B12 694.  Iron saturation index 14.  Will start on folic acid supplements 1 mg twice daily.  IV Venofer x 1.  Oral iron supplements on discharge.  Monitor folic acid levels in 10 weeks     Hypoalbuminemia likely dilutional, acute illness.  Serum albumin 2.9.  Dietary supplements with Ensure.  Diuresis as above.  Monitor in AM.     Ulcerative colitis  Chronic diarrhea,   *He states for the last several months he has had over 10  loose bowel movements a day.  He was seen by his gastroenterologist who stated a stool sample was done and he was told this was not due to ulcerative colitis.  There has been no recent changes in his medications.  He denies any abdominal pain or cramping with this no black or bloody stools  Continue home Colazal   Loperamide ordered as needed  Recommended avoiding dairy products, Outpatient [facility] evaluation.                 Depression  Continue PTA Zoloft      GERD  Continue PTA Protonix      Gout  Continue home Allopurinol      HAKEEM  Continue home CPAP     HAKEEM with a BMI of 32.62.  Consider lifestyle modification with diet and exercise as able to.    Physical deconditioning from medical illness.  Patient reports lives alone at home.   Rehab Evaluation.  Care management assistance with transition    Orders Placed This Encounter      Advance Diet as Tolerated: Fully Advanced to diet(s) per Provider order      DVT Prophylaxis: CDs.  Holding pharmacological DVT prophylaxis for surgery.  Code Status: Full Code  Disposition: Expected discharge likely Sunday 1/28.    Discussed with patient, bedside RN  > 51 minutes spent by me on the date of service doing chart review, history, exam, documentation & further activities per the note.      Eduin Saeed MD        Interval History:        Patient lying in bed.  Denies any headache or dizziness.  Denies any new tingling or numbness.  Denies any nausea or vomiting.  Denies any shortness of breath.  No chest pain or palpitations.  No active bleeding.  No abdominal pain.  No fever or chills.  Underwent surgery this morning.  Anticoagulation on hold.         Physical Exam:        Physical Exam   Temp:  [96.9  F (36.1  C)-98.2  F (36.8  C)] 96.9  F (36.1  C)  Pulse:  [58-77] 58  Resp:  [16-20] 18  BP: (101-141)/(63-94) 115/75  SpO2:  [99 %-100 %] 100 %    Intake/Output Summary (Last 24 hours) at 1/25/2024 0814  Last data filed at 1/24/2024 1906  Gross per 24 hour   Intake 100 ml    Output --   Net 100 ml       Admission Weight: 105.7 kg (233 lb)  Current Weight: 106.1 kg (233 lb 14.5 oz)    PHYSICAL EXAM  GENERAL: Patient is in no distress. Alert and oriented.  HEART: Regular rate and rhythm. S1S2.   LUNGS: Clear to auscultation bilaterally. No expiratory wheeze.  Respirations unlabored  NEURO: Moving all extremities.  EXTREMITIES: 2+ pitting edema.  Stasis changes.  Right second toe wound clean dry.  Right foot erythema present.  SKIN: Warm, dry.  PSYCHIATRY Cooperative       Medications:         [Auto Hold] allopurinol  200 mg Oral Daily    [Auto Hold] balsalazide  4,500 mg Oral Daily    [Auto Hold] ceFEPIme  2 g Intravenous Q8H    [Auto Hold] folic acid  1 mg Oral BID    [Auto Hold] furosemide  20 mg Oral Daily    [Auto Hold] insulin aspart  1-6 Units Subcutaneous Q4H    [Auto Hold] Kendell  1 packet Oral BID 09 12    [Auto Hold] lisinopril  20 mg Oral Daily    [Auto Hold] metoprolol succinate ER  25 mg Oral Daily    [Auto Hold] pantoprazole  40 mg Oral Daily    [Auto Hold] potassium chloride ER  10 mEq Oral Daily    [Auto Hold] sertraline  150 mg Oral Daily    [Auto Hold] sodium chloride (PF)  3 mL Intracatheter Q8H     [Auto Hold] acetaminophen **OR** [Auto Hold] acetaminophen, [Auto Hold] sore throat, [Auto Hold] calcium carbonate, [Auto Hold] glucose **OR** [Auto Hold] dextrose **OR** [Auto Hold] glucagon, [Auto Hold] diphenhydrAMINE (BENADRYL) 50 mg, acetaminophen (TYLENOL) 1,000 mg alternative for Tylenol PM, fentaNYL, fentaNYL, HYDROmorphone, HYDROmorphone, [Auto Hold] lidocaine 4%, [Auto Hold] lidocaine (buffered or not buffered), [Auto Hold] loperamide, [Auto Hold] melatonin, [Auto Hold] menthol-zinc oxide, ondansetron **OR** ondansetron, [Auto Hold] ondansetron **OR** [Auto Hold] ondansetron, [Auto Hold] polyethylene glycol, [Auto Hold] prochlorperazine **OR** [Auto Hold] prochlorperazine **OR** [Auto Hold] prochlorperazine, prochlorperazine, [Auto Hold] senna-docusate **OR**  [Auto Hold] senna-docusate, [Auto Hold] sodium chloride (PF)         Data:      All new lab and imaging data was reviewed.

## 2024-01-26 NOTE — ANESTHESIA CARE TRANSFER NOTE
Patient: Fer Myles    Procedure: Procedure(s):  Right foot second digit amputation       Diagnosis: Osteomyelitis of right foot, unspecified type (H) [M86.9]  Diagnosis Additional Information: No value filed.    Anesthesia Type:   MAC     Note:    Oropharynx: oropharynx clear of all foreign objects and spontaneously breathing  Level of Consciousness: drowsy  Oxygen Supplementation: face mask  Level of Supplemental Oxygen (L/min / FiO2): 6  Independent Airway: airway patency satisfactory and stable  Dentition: dentition unchanged  Vital Signs Stable: post-procedure vital signs reviewed and stable  Report to RN Given: handoff report given  Patient transferred to: PACU  Comments: At end of procedure, spontaneous respirations, patient alert to voice, able to follow commands. Oxygen via facemask at 6 liters per minute to PACU. Oxygen tubing connected to wall O2 in PACU, SpO2, NiBP, and EKG monitors and alarms on and functioning, report on patient's clinical status given to PACU RN, RN questions answered.      Handoff Report: Identifed the Patient, Identified the Reponsible Provider, Reviewed the pertinent medical history, Discussed the surgical course, Reviewed Intra-OP anesthesia mangement and issues during anesthesia, Set expectations for post-procedure period and Allowed opportunity for questions and acknowledgement of understanding      Vitals:  Vitals Value Taken Time   BP     Temp     Pulse     Resp     SpO2         Electronically Signed By: OLGA Bermudez CRNA  January 26, 2024  8:24 AM

## 2024-01-27 NOTE — CONSULTS
CLINICAL NUTRITION SERVICES - BRIEF NOTE     Nutrition Prescription    Recommendations already ordered by Registered Dietitian (RD):  Ordered 3 pkts of Kendell for today and to be delivered now       NEW FINDINGS   - Received call from RN saying they would like the Kendell now and 3 pkts.   - Plan was to send 2 pkts with lunch today. Updated the order to send with breakfast going forward but 2 pkts/day     INTERVENTIONS  Implementation  Collaboration with other providers  Medical food supplement therapy    Monitoring/Evaluation  Will continue to monitor and evaluate per protocol.    Kyle Ibarra RD, LD

## 2024-01-27 NOTE — PROGRESS NOTES
01/27/24 0915   Appointment Info   Signing Clinician's Name / Credentials (OT) Colin Agustin OTR/L   Living Environment   People in Home alone   Current Living Arrangements house   Home Accessibility stairs to enter home;stairs within home   Number of Stairs, Main Entrance 2   Number of Stairs, Within Home, Primary other (see comments)  (Full flight down to basement)   Stair Railings, Within Home, Primary railings safe and in good condition   Transportation Anticipated family or friend will provide   Living Environment Comments Pt reports living alone in house w/ 2 PATRICIA and full flight down to basement for laundry. Pt states family lives close by. Tub shower currently but will be getting updated to walk in on 1/31/24. Will have shower seat and grab bars.   Self-Care   Usual Activity Tolerance good   Current Activity Tolerance moderate   Equipment Currently Used at Home shower chair   Fall history within last six months yes   Number of times patient has fallen within last six months 1   Activity/Exercise/Self-Care Comment Pt reports being IND w/ all ADL's at baseline prior to admission.   Instrumental Activities of Daily Living (IADL)   Previous Responsibilities meal prep;housekeeping;laundry;medication management;finances;driving   IADL Comments Pt reports being IND w/ all IADL's at baseline prior to admission. Pt reports that they still drive.   General Information   Onset of Illness/Injury or Date of Surgery 01/24/24   Referring Physician Eduin Saeed MD   Patient/Family Therapy Goal Statement (OT) Return to home.   Additional Occupational Profile Info/Pertinent History of Current Problem Fer Myles is a 63 year old male with paroxysmal atrial fibrillation, type 2 diabetes mellitus, alcohol use disorder, hypertension, obesity, GI bleeding, and osteomyelitis of the great right toe admitted on 1/24/2024 for worsening toe wound.   Existing Precautions/Restrictions fall;weight bearing   Right Lower  Extremity (Weight-bearing Status) other (see comments)  (Heel weight bearing)   Cognitive Status Examination   Orientation Status orientation to person, place and time   Visual Perception   Visual Impairment/Limitations corrective lenses for reading   Sensory   Sensory Comments Neuropathy in B feet at baseline   Pain Assessment   Patient Currently in Pain No   Range of Motion Comprehensive   General Range of Motion no range of motion deficits identified   Strength Comprehensive (MMT)   General Manual Muscle Testing (MMT) Assessment no strength deficits identified   Bed Mobility   Bed Mobility supine-sit   Supine-Sit Cochran (Bed Mobility) independent   Transfers   Transfers sit-stand transfer;toilet transfer   Sit-Stand Transfer   Sit-Stand Cochran (Transfers) supervision   Toilet Transfer   Type (Toilet Transfer) stand-sit;sit-stand   Cochran Level (Toilet Transfer) supervision   Activities of Daily Living   BADL Assessment/Intervention lower body dressing;grooming;toileting   Lower Body Dressing Assessment/Training   Position (Lower Body Dressing) edge of bed sitting   Cochran Level (Lower Body Dressing) supervision   Grooming Assessment/Training   Position (Grooming) sink side   Cochran Level (Grooming) modified independence   Toileting   Cochran Level (Toileting) modified independence   Clinical Impression   Criteria for Skilled Therapeutic Interventions Met (OT) Yes, treatment indicated   OT Diagnosis Decreased activity tolerance   Influenced by the following impairments Decreased ADL independence   OT Problem List-Impairments impacting ADL problems related to;activity tolerance impaired;post-surgical precautions   Assessment of Occupational Performance 1-3 Performance Deficits   Identified Performance Deficits Home mgmt   Planned Therapy Interventions (OT) ADL retraining;home program guidelines;progressive activity/exercise;risk factor education   Clinical Decision Making  Complexity (OT) problem focused assessment/low complexity   Risk & Benefits of therapy have been explained evaluation/treatment results reviewed;care plan/treatment goals reviewed;risks/benefits reviewed;current/potential barriers reviewed;patient   OT Total Evaluation Time   OT Eval, Low Complexity Minutes (39913) 10   OT Goals   Therapy Frequency (OT) One time eval and treatment   OT Predicted Duration/Target Date for Goal Attainment 01/27/24   OT Goals Hygiene/Grooming;Lower Body Dressing;Toilet Transfer/Toileting   OT: Hygiene/Grooming modified independent;while standing;Goal Met   OT: Lower Body Dressing Supervision/stand-by assist;including set-up/clothing retrieval;Goal Met   OT: Toilet Transfer/Toileting Modified independent;cleaning and garment management;Goal Met   Self-Care/Home Management   Self-Care/Home Mgmt/ADL, Compensatory, Meal Prep Minutes (03880) 18   Symptoms Noted During/After Treatment (Meal Preparation/Planning Training) fatigue   Treatment Detail/Skilled Intervention Pt greeted sitting EOB and agreeable for OT evaluation. Pt very pleasant throughout therapy session. Reviewed Heel weight bearing status for RLE and pt was able to verbalize understanding. Pt reporting no pain prior to activity. Pt able to don R surgical shoe and L shoe w/ supervision while seated EOB. Supervision sit > stand edge of bed w/ no AD. Cued pt in maintain Heel weight bearing status. Pt ambulated within room to bathroom and completed toilet transfer w/ supervision and 1 g/h task sinkside. Pt returned to room and seated EOB. Pt able to doff B shoes w/ supervision. Reviewed discharge to home. Pt plans to have groceries delivered to house and will have assist from family w/ getting to appointments and help w/ laundry. Pt w/ no additional concerns regarding discharge. Pt remained seated EOB at end of session w/ all needs met.   OT Discharge Planning   OT Plan Dischage OT   OT Discharge Recommendation (DC Rec) home;home  with assist   OT Rationale for DC Rec Pt appears to be functioning at/near baseline. Pt lives alone in house and reports being IND w/ all I/ADL's at baseline. Anticipate pt will be safe to d/c home once medically cleared.   OT Brief overview of current status See above   Total Session Time   Timed Code Treatment Minutes 18   Total Session Time (sum of timed and untimed services) 28

## 2024-01-27 NOTE — PLAN OF CARE
Occupational Therapy Discharge Summary    Reason for therapy discharge:    All goals and outcomes met, no further needs identified.    Progress towards therapy goal(s). See goals on Care Plan in [facility] electronic health record for goal details.  Goals met    Therapy recommendation(s):    No further therapy is recommended. Pt appears to be functioning at/near baseline. Pt lives alone in house and reports being IND w/ all I/ADL's at baseline. Anticipate pt will be safe to d/c home once medically cleared.
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
## 2024-01-27 NOTE — PLAN OF CARE
Date & Time: 1/26/24 1900-2330     Surgery/POD#: POD 1 R foot 2nd digit amputation     Behavior & Aggression: GREEN  Fall Risk: yes  Orientation: A&Ox4   ABNL VS/O2: VSS on CPAP overnight  ABNL Labs: n/a  Pain Management: Denies  Bowel/Bladder: Voids in the urinal, multiple loose stools this shift  Diet: Mod carb  Activity Level: Ind  Tests/Procedures: n/a   Anticipated DC Date: Pending improvement  Significant Information: R 2nd toe amputation CDI, R shin wound CDI - wound care orders in place

## 2024-01-27 NOTE — PLAN OF CARE
Date & Time: 2300-0700  Surgery/POD#: POD 1 from R foot 2nd digit amputation  Behavior & Aggression: Green  Fall Risk: No  Orientation: A&Ox4  ABNL VS/O2:VSS on RA. Used CPAP overnight   Pain Management: Denies pain  Bowel/Bladder: Continent. Voiding adequately. Passing gas, loose BM during shift   Drains: PIV SL   Diet:Tolerating mod carb diet   Activity Level: IND   Anticipated  DC Date: Pending

## 2024-01-27 NOTE — PROGRESS NOTES
VSS. A/O. Ind. R foot dressing CDI. New dressing applied on lateral leg wound. Tolerating diet. Voiding. Baseline neuropathy. Tele SB in 50s

## 2024-01-27 NOTE — PROGRESS NOTES
[facility]  Hospitalist Progress Note   01/27/2024          Assessment and Plan:       Fer Myles is a 63 year old male with paroxysmal atrial fibrillation, type 2 diabetes mellitus, alcohol use disorder, hypertension, obesity, GI bleeding, and osteomyelitis of the great right toe admitted on 1/24/2024 for worsening toe wound.    Status post right second digit amputation 1/26/2024.  Right second digit osteomyelitis.    Right forefoot cellulitis.  Lactic acidosis cleared   Chronic ulcer right second toe.  Chronic venous stasis edema  History of right great toe amputation for osteomyelitis 10/2023.  --Presented with worsening to wound.    --Afebrile.  WBC count within normal limits. .53. Lactic acid 2.1 > 0.9  --X-ray right foot soft tissue wound at the distal tip of the second digit with focal demineralization and probable cortical bone loss involving the tuft of the distal phalanx suggestive of osteomyelitis.  - MRI with Osteomyelitis with cortical destruction and abnormal confluent decreased T1 signal involving the mid and distal phalanges of the right second toe.  See report for details.  -US ANA MARIA -. Some limitations of exam given calcified noncompressible arteries and unable to obtain TBI values in the right lower extremity. Waveforms and right ANA MARIA values appear relatively intact. Unable to obtain resting ANA MARIA value of the left lower extremity due to noncompressible arteries. TBI is intact, as are waveforms.  --Status post right second digit amputation 1/26/2024.  Per report Significant necrosis to site. Following debridement, tissue viable. Adequate bleeding for procedure. Documented EBL 10 mL.    --- Defer postoperative care including rehabilitation to podiatry -> Recommend another 24 hours of IV antibiotics. Likely discharge tomorrow with culture information.   --Continue IV cefepime 1/24.  Trend WBC count, fever curve.  --Follow intraoperative cultures.  Infectious disease consult  for antibiotics requested.  Vascular surgery following, recommend to start aspirin.  Resume PTA AC  Appreciate multidisciplinary team input.  IV/p.o. as needed pain meds.  Follow lipid panel, will consider statin therapy.  PT, OT evaluation.  Care management assistance with transition.  - Patient might have venous insufficiency given the shin wound with hyperpigmentation around the calf, recommend obtaining reflux study as an outpatient  Lymphedema consult requested.  Elevate foot at rest.     Paroxysmal Afib on Eliquis  Hypertension.  Held PTA apixaban, await podiatry input on restarting anticoagulation.  Continue PTA metoprolol.  Continue PTA lisinopril  Continue PTA Lasix and potassium.  Noted slightly elevated proBNP of 3098.      Type 2 diabetes mellitus with hemoglobin A1c 4.8 in 1/24/2024  Restart PTA Jardiance and pioglitazone 1/26.  Correction scale insulin.  Monitor blood sugars and optimize regimen.     Chronic macrocytic anemia, stable   H/o Iron deficiency with h/o chronic GI bleeding associated with UC  Folic acid deficiency.  WBC 6.8, hemoglobin 9.6 (stable) ,   Hemoglobin stable at 10 > 9.6.  Monitor in AM.  Folic acid 2.1.  Vitamin B12 694.  Iron saturation index 14.  Will start on folic acid supplements 1 mg twice daily.  IV Venofer x 1.  Oral iron supplements on discharge.  Monitor folic acid levels in 10 weeks     Hypoalbuminemia likely dilutional, acute illness.  Serum albumin 2.9.  Dietary supplements with Ensure.  Diuresis as above.    Ulcerative colitis  Chronic diarrhea,   *He states for the last several months he has had over 10 loose bowel movements a day.  He was seen by his gastroenterologist who stated a stool sample was done and he was told this was not due to ulcerative colitis.  There has been no recent changes in his medications.  He denies any abdominal pain or cramping with this no black or bloody stools  Continue home Colazal   Loperamide ordered as  needed  Recommended avoiding dairy products, Outpatient [facility] evaluation.                 Depression  Continue PTA Zoloft      GERD  Continue PTA Protonix      Gout  Continue home Allopurinol      HAKEEM  Continue home CPAP     HAKEEM with a BMI of 32.62.  Consider lifestyle modification with diet and exercise as able to.    Physical deconditioning from medical illness.  Patient reports lives alone at home.   Rehab Evaluation.  Care management assistance with transition    Orders Placed This Encounter      Moderate Consistent Carb (60 g CHO per Meal) Diet      DVT Prophylaxis: CDs.  Holding pharmacological DVT prophylaxis for surgery.  Code Status: Full Code  Disposition: Expected discharge likely Sunday 1/28.    Discussed with patient, bedside RN  > 35 minutes spent by me on the date of service doing chart review, history, exam, documentation & further activities per the note.      Guru Baez MD        Interval History:        No acute events overnight  Denies any headache or dizziness.  Denies any new tingling or numbness.  Denies any nausea or vomiting.  Denies any shortness of breath.  No chest pain or palpitations.  No active bleeding.  No abdominal pain.  No fever or chills.  Doing well post op  Anticoagulation resumed         Physical Exam:        Physical Exam   Temp:  [97.5  F (36.4  C)-98  F (36.7  C)] 97.6  F (36.4  C)  Pulse:  [50-87] 50  Resp:  [16-18] 18  BP: (126-138)/(70-81) 137/78  SpO2:  [98 %-100 %] 99 %      PHYSICAL EXAM  GENERAL: Patient is in no distress. Alert and oriented.  HEART: Regular rate and rhythm. S1S2.   LUNGS: Clear to auscultation bilaterally. No expiratory wheeze.  Respirations unlabored  NEURO: Moving all extremities.  EXTREMITIES: 2+ pitting edema.  Stasis changes.  Right second toe wound clean dry.  Right foot erythema present.  SKIN: Warm, dry.  PSYCHIATRY Cooperative       Medications:         acetaminophen  975 mg Oral Q8H    allopurinol  200 mg Oral Daily    apixaban  ANTICOAGULANT  5 mg Oral BID    balsalazide  4,500 mg Oral Daily    ceFEPIme  2 g Intravenous Q8H    empagliflozin  25 mg Oral Daily    ferrous sulfate  325 mg Oral Daily    folic acid  1 mg Oral BID    furosemide  20 mg Oral Daily    Kendell  1 packet Oral BID 09 12    lisinopril  20 mg Oral Daily    metoprolol succinate ER  25 mg Oral Daily    pantoprazole  40 mg Oral Daily    pioglitazone  30 mg Oral Daily    polyethylene glycol  17 g Oral Daily    potassium chloride ER  10 mEq Oral Daily    senna-docusate  1 tablet Oral BID    sertraline  150 mg Oral Daily    sodium chloride (PF)  3 mL Intracatheter Q8H    sodium chloride (PF)  3 mL Intracatheter Q8H     acetaminophen **OR** acetaminophen, sore throat, bisacodyl, calcium carbonate, glucose **OR** dextrose **OR** glucagon, diphenhydrAMINE (BENADRYL) 50 mg, acetaminophen (TYLENOL) 1,000 mg alternative for Tylenol PM, diphenhydrAMINE, EPINEPHrine, famotidine, lidocaine 4%, lidocaine (buffered or not buffered), loperamide, magnesium hydroxide, melatonin, menthol-zinc oxide, methylPREDNISolone, ondansetron **OR** ondansetron, polyethylene glycol, prochlorperazine **OR** prochlorperazine **OR** prochlorperazine, senna-docusate **OR** senna-docusate, sodium chloride (PF)         Data:      All new lab and imaging data was reviewed.

## 2024-01-27 NOTE — PLAN OF CARE
Goal Outcome Evaluation:  PT- Order received for PT and lymphadema therapy. Therapist discussed both with patient. Per OT note and per discussion with patient is independent with mobility and functioning near baseline so no PT needs indicated.   Discussed lymphadema with patient. Noted he is discharging tomorrow so if PT started lymphadema wraps in the hospital, would be unable to follow up. He reports he has compression stockings at home but has not been able to wear them due to open wound on lateral aspect right lower leg and because of toe that was amputated. He reports a HH nurse sees him 3x/week to address wounds. Therapist and patient agreed to discuss lymphadema treatment with HH nurse and if appropriate for HH nurse to refer him for outpatient or HH lymphadema. No further inpatient PT needs. Will complete orders and sign off.
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
## 2024-01-28 NOTE — PROGRESS NOTES
[facility]    Infectious Disease Progress Note    Date of Service : 01/28/2024     Assessment:  63YM with diabetes who has been admitted due to a worsening right second digit infection with evidence of underlying osteomyelitis and is s/p amputation of the right 2nd toe on 1/26. Operative cxs are growing S.aureus MSSA and GPC in artis/chains     -Right 2nd toe osteomyelitis with cellulitis  -Chronic lateral right foot ulceration  -Chronic venous stasis   -Prior right great toe amputation  -Chronic medical conditions - controlled diabetes, HTN, atrial fibrillation, depression, ulcerative colitis, gout , HAKEEM     Recommendations  Discontinue Cefepime  Treat with oral Augmentin for another week (amoxicillin allergy hx was > 20 years ago)  Follow with podiatry  Discharge is planned today, ID will sign off  Discussed with the hospitalist service    Faye Quiñonez MD    Interval History   Feels ok, no new complaints, pain is controlled, cxs are growing staph aureus MSSA and strep sp    Physical Exam   Temp: 97.8  F (36.6  C) Temp src: Oral BP: 119/71 Pulse: 63   Resp: 16 SpO2: 100 % O2 Device: None (Room air)    Vitals:    01/25/24 0442 01/27/24 0533 01/28/24 0533   Weight: 106.1 kg (233 lb 14.5 oz) 105.3 kg (232 lb 1.6 oz) 104 kg (229 lb 4.5 oz)     Vital Signs with Ranges  Temp:  [96.9  F (36.1  C)-97.8  F (36.6  C)] 97.8  F (36.6  C)  Pulse:  [57-63] 63  Resp:  [16-17] 16  BP: (119-126)/(71-79) 119/71  SpO2:  [99 %-100 %] 100 %    Constitutional: Awake, alert, cooperative, no apparent distress  Lungs: non labored breathing  Skin: No rashes, no cyanosis, no edema  MS: r foot in surgical dressing     Other:    Medications      acetaminophen  975 mg Oral Q8H    allopurinol  200 mg Oral Daily    apixaban ANTICOAGULANT  5 mg Oral BID    balsalazide  4,500 mg Oral Daily    ceFEPIme  2 g Intravenous Q8H    empagliflozin  25 mg Oral Daily    ferrous sulfate  325 mg Oral Daily    folic acid  1 mg Oral BID     furosemide  20 mg Oral Daily    Kendell  1 packet Oral BID 09 12    lisinopril  20 mg Oral Daily    metoprolol succinate ER  25 mg Oral Daily    pantoprazole  40 mg Oral Daily    pioglitazone  30 mg Oral Daily    polyethylene glycol  17 g Oral Daily    potassium chloride ER  10 mEq Oral Daily    senna-docusate  1 tablet Oral BID    sertraline  150 mg Oral Daily    sodium chloride (PF)  3 mL Intracatheter Q8H    sodium chloride (PF)  3 mL Intracatheter Q8H       Data   All microbiology laboratory data reviewed.  Recent Labs   Lab Test 01/27/24  0716 01/24/24  1644 10/22/23  0712 10/21/23  0825   WBC 4.5 6.8  --  6.8   HGB 9.2* 9.6* 9.6* 10.1*   HCT 27.5* 29.2*  --  31.5*   * 107*  --  104*    188  --  352     Recent Labs   Lab Test 01/27/24  0716 01/24/24  1644 10/23/23  0819   CR 0.71 0.83 0.93     Recent Labs   Lab Test 01/25/24  0703   SED 61*       Microbiology  1/26 tissue cx  01/26/2024 0756 01/28/2024 1219 Tissue Aerobic Bacterial Culture Routine [[id number]]    (Abnormal)   Tissue from Toe, Right    Preliminary result Component Value   Culture Culture in progress P    1+ Staphylococcus aureus Abnormal  P    Isolated in broth only Gram positive cocci in pairs and chains Abnormal  P    On day 1 of incubation       Susceptibility     Staphylococcus aureus     KAITLIN     Ciprofloxacin <=0.5 ug/mL Susceptible *     Clindamycin 0.25 ug/mL Susceptible     Daptomycin 0.5 ug/mL Susceptible     Doxycycline <=0.5 ug/mL Susceptible     Erythromycin <=0.25 ug/mL Susceptible     Gentamicin <=0.5 ug/mL Susceptible     Inducible macrolide resistance test Negative ug/mL Negative *     Levofloxacin 0.25 ug/mL Susceptible *     Linezolid 2 ug/mL Susceptible *     Moxifloxacin <=0.25 ug/mL Susceptible *     Nitrofurantoin <=16 ug/mL Susceptible *     Oxacillin 0.5 ug/mL Susceptible 1     Rifampin <=0.5 ug/mL Susceptible *     Tetracycline <=1 ug/mL Susceptible     Tigecycline <=0.12 ug/mL Susceptible *      Trimethoprim/Sulfamethoxazole <=0.5/9.5 u... Susceptible     Vancomycin <=0.5 ug/mL Susceptible

## 2024-01-28 NOTE — PROGRESS NOTES
Care Management Discharge Note    Discharge Date: 01/28/2024       Discharge Disposition:  Home         Discharge Services:  Home Care    Discharge DME:      Discharge Transportation: family or friend will provide    Private pay costs discussed: Not applicable    Does the patient's insurance plan have a 3 day qualifying hospital stay waiver?  Yes     Which insurance plan 3 day waiver is available? Alternative insurance waiver    Will the waiver be used for post-acute placement? No    PAS Confirmation Code:    Patient/family educated on Medicare website which has current facility and service quality ratings:      Education Provided on the Discharge Plan:    Persons Notified of Discharge Plans: [facility]  Patient/Family in Agreement with the Plan:  yes    Handoff Referral Completed: No    Additional Information:  Discharge to home with resumption of Home Care services. Orders faxed to [facility]    Wen Tang RN   [facility]   Phone [phone], Vocera or [phone]

## 2024-01-28 NOTE — PLAN OF CARE
Goal Outcome Evaluation:  Pt is alert and oriented x 4, AVSS room air. Denied pain, right foot dressing- CDI. Up independently. Discharge pending

## 2024-01-28 NOTE — DISCHARGE SUMMARY
"[facility]  Hospitalist Discharge Summary      Date of Admission:  1/24/2024  Date of Discharge:  1/28/2024  Discharging Provider: Guru Baez MD  Discharge Service: Hospitalist Service    Discharge Diagnoses     Right second digit osteomyelitis.    Right forefoot cellulitis.    Clinically Significant Risk Factors     # Obesity: Estimated body mass index is 31.98 kg/m  as calculated from the following:    Height as of this encounter: 1.803 m (5' 11\").    Weight as of this encounter: 104 kg (229 lb 4.5 oz).       Follow-ups Needed After Discharge   Follow-up Appointments     Follow-up and recommended labs and tests       Follow up with Jolanta Diallo DPM at [facility], located at   [address] [place] [address] [address], [place] [address]. Phone number is   [phone]. Call to schedule appt in 1-2 weeks.        Follow-up and recommended labs and tests       Follow up with primary care provider, Gonzalez Mancuso, within 7 days for   hospital follow- up.  No follow up labs or test are needed.            Unresulted Labs Ordered in the Past 30 Days of this Admission       Date and Time Order Name Status Description    1/26/2024  7:56 AM Surgical Pathology Exam In process     1/26/2024  7:56 AM Tissue Aerobic Bacterial Culture Routine Preliminary     1/26/2024  7:56 AM Anaerobic Bacterial Culture Routine Preliminary     1/24/2024  5:20 PM Blood Culture Arm, Right Preliminary     1/24/2024  5:20 PM Blood Culture Arm, Right Preliminary           Discharge Disposition   Discharged to home  Condition at discharge: Stable    Hospital Course      Fer Myles is a 63 year old male with paroxysmal atrial fibrillation, type 2 diabetes mellitus, alcohol use disorder, hypertension, obesity, GI bleeding, and osteomyelitis of the great right toe admitted on 1/24/2024 for worsening toe wound.     Status post right second digit amputation 1/26/2024.  Right second digit osteomyelitis.    Right forefoot " cellulitis.  Lactic acidosis cleared   Chronic ulcer right second toe.  Chronic venous stasis edema  History of right great toe amputation for osteomyelitis 10/2023.  --Presented with worsening to wound.    --Afebrile.  WBC count within normal limits. .53. Lactic acid 2.1 > 0.9  --X-ray right foot soft tissue wound at the distal tip of the second digit with focal demineralization and probable cortical bone loss involving the tuft of the distal phalanx suggestive of osteomyelitis.  - MRI with Osteomyelitis with cortical destruction and abnormal confluent decreased T1 signal involving the mid and distal phalanges of the right second toe.  See report for details.  -US ANA MARIA -. Some limitations of exam given calcified noncompressible arteries and unable to obtain TBI values in the right lower extremity. Waveforms and right ANA MARIA values appear relatively intact. Unable to obtain resting ANA MARIA value of the left lower extremity due to noncompressible arteries. TBI is intact, as are waveforms.  --Status post right second digit amputation 1/26/2024.  Per report Significant necrosis to site. Following debridement, tissue viable. Adequate bleeding for procedure. Documented EBL 10 mL.    --- Defer postoperative care including rehabilitation to podiatry -> Recommend another 24 hours of IV antibiotics. Likely discharge tomorrow with culture information.   --Continue IV cefepime 1/24.  Trend WBC count, fever curve. -> augmentin at discharge  --Follow intraoperative cultures.  Infectious disease consulted for antibiotics requested.  Vascular surgery following, recommend to start aspirin.  Resume PTA AC  Appreciate multidisciplinary team input.  PT, OT evaluation.  Care management assistance with transition.  - Patient might have venous insufficiency given the shin wound with hyperpigmentation around the calf, recommend obtaining reflux study as an outpatient  Lymphedema consult was requested.  Elevate foot at rest.      Paroxysmal  Afib on Eliquis  Hypertension.  Held PTA apixaban, await podiatry input on restarting anticoagulation.  Continue PTA metoprolol.  Continue PTA lisinopril  Continue PTA Lasix and potassium.  Noted slightly elevated proBNP of 3098.       Type 2 diabetes mellitus with hemoglobin A1c 4.8 in 1/24/2024  Restart PTA Jardiance and pioglitazone 1/26.  Correction scale insulin.  Monitor blood sugars and optimize regimen.     Chronic macrocytic anemia, stable   H/o Iron deficiency with h/o chronic GI bleeding associated with UC  Folic acid deficiency.  WBC 6.8, hemoglobin 9.6 (stable) ,   Hemoglobin stable at 10 > 9.6.  Monitor in AM.  Folic acid 2.1.  Vitamin B12 694.  Iron saturation index 14.  Will start on folic acid supplements 1 mg twice daily.  IV Venofer x 1.  Oral iron supplements on discharge.  Monitor folic acid levels in 10 weeks      Hypoalbuminemia likely dilutional, acute illness.  Serum albumin 2.9.  Dietary supplements with Ensure.  Diuresis as above.     Ulcerative colitis  Chronic diarrhea,   *He states for the last several months he has had over 10 loose bowel movements a day.  He was seen by his gastroenterologist who stated a stool sample was done and he was told this was not due to ulcerative colitis.  There has been no recent changes in his medications.  He denies any abdominal pain or cramping with this no black or bloody stools  Continue home Colazal   Recommended avoiding dairy products, Outpatient [facility] evaluation.                 Depression  Continue PTA Zoloft      GERD  Continue PTA Protonix      Gout  Continue home Allopurinol      HAKEEM  Continue home CPAP      HAKEEM with a BMI of 32.62.  Consider lifestyle modification with diet and exercise as able to.    Consultations This Hospital Stay   PODIATRY IP CONSULT  PHYSICAL THERAPY ADULT IP CONSULT  WOUND OSTOMY CONTINENCE NURSE  IP CONSULT  INFECTIOUS DISEASES IP CONSULT  LYMPHEDEMA THERAPY IP CONSULT  SPIRITUAL HEALTH SERVICES IP  CONSULT  VASCULAR SURGERY IP CONSULT  OCCUPATIONAL THERAPY ADULT IP CONSULT  ORTHOSIS EXTREMITY LOWER REFERRAL IP CONSULT  INFECTIOUS DISEASES IP CONSULT    Code Status   Full Code    Time Spent on this Encounter   I, Guru Baez MD, personally saw the patient today and spent greater than 30 minutes discharging this patient.       Guru Baez MD  [facility] SURGERY  [address] [facility] [address]  Phone: [phone]  Fax: [phone]  ______________________________________________________________________    Physical Exam   Vital Signs: Temp: 97.8  F (36.6  C) Temp src: Oral BP: 119/71 Pulse: 63   Resp: 16 SpO2: 100 % O2 Device: None (Room air)    Weight: 229 lbs 4.45 oz  ----------------------------------------------------------------------------------------       Primary Care Physician   Gonzalez Mancuso    Discharge Orders      Follow-up and recommended labs and tests     Follow up with Jolanta Diallo DPM at the [facility], located at [address] [place] [address] [address], [place] [id number]. Phone number is [phone]. Call to schedule appt in 1-2 weeks.     Activity    WB to heel only of RLE in surgical shoe. Elevate while at rest. Place ice behind knee for 15 minutes at a time.  Leave dressing to foot  at all times. Do not get wet in the shower.     For every other day dressing changes: Cleanse site with wound cleanser. Pad dry. Paint betadine to incision site. Cover with 4x4s, gauze wrap and an ACE bandage. Contact the office if you have any concerns.     Reason for your hospital stay    You had right foot second digit amputation due to an infection.     Follow-up and recommended labs and tests     Follow up with primary care provider, Gonzalez Mancuso, within 7 days for hospital follow- up.  No follow up labs or test are needed.     Activity    Your activity upon discharge: activity as tolerated     Resume Home Care Services     Diet    Follow this diet upon discharge:  Orders Placed This Encounter      Snacks/Supplements Adult: Ensure Clear; With Meals      Snacks/Supplements Adult: Kendell; With Meals      Moderate Consistent Carb (60 g CHO per Meal) Diet       Significant Results and Procedures   Most Recent 3 CBC's:  Recent Labs   Lab Test 01/27/24  0716 01/24/24  1644 10/22/23  0712 10/21/23  0825   WBC 4.5 6.8  --  6.8   HGB 9.2* 9.6* 9.6* 10.1*   * 107*  --  104*    188  --  352     Most Recent 3 BMP's:  Recent Labs   Lab Test 01/27/24  0754 01/27/24  0716 01/27/24  0528 01/26/24  1348 01/26/24  1011 01/25/24  1220 01/25/24  1104 01/24/24  2354 01/24/24  1644 10/23/23  0833 10/23/23  0819   NA  --  136  --   --   --   --   --   --  135  --  136   POTASSIUM  --  4.0  --   --  3.5  --  3.6  --  3.6  --  3.8   CHLORIDE  --  103  --   --   --   --   --   --  97*  --  102   CO2  --  27  --   --   --   --   --   --  30*  --  25   BUN  --  5.7*  --   --   --   --   --   --  9.8  --  6.7*   CR  --  0.71  --   --   --   --   --   --  0.83  --  0.93   ANIONGAP  --  6*  --   --   --   --   --   --  8  --  9   RENATE  --  8.1*  --   --   --   --   --   --  8.0*  --  8.8   GLC 82 90 80   < >  --    < >  --    < > 139*   < > 116*    < > = values in this interval not displayed.     Most Recent 2 LFT's:  Recent Labs   Lab Test 01/27/24  0716 01/24/24  1644   AST 19 17   ALT 6 5   ALKPHOS 119 143   BILITOTAL 0.3 0.3     Most Recent 3 INR's:  Recent Labs   Lab Test 10/19/23  1146 02/19/22  0602 02/18/22  1548   INR 1.03 1.09 1.17*     Most Recent 3 Troponin's:  Recent Labs   Lab Test 03/04/21  1434 06/20/18  1235   TROPI <0.015 <0.015     Most Recent 3 BNP's:  Recent Labs   Lab Test 01/25/24  1104 03/04/21  1434   NTBNPI 3,098* 705     Most Recent D-dimer:  Recent Labs   Lab Test 06/20/18  1235   DD <0.3     Most Recent Cholesterol Panel:  Recent Labs   Lab Test 01/26/24  1011   CHOL 60   LDL 14   HDL 21*   TRIG 125     7-Day Micro Results       Collected Updated Procedure Result  Status      01/26/2024 0756 01/28/2024 1101 Anaerobic Bacterial Culture Routine [[id number]]   Tissue from Toe, Right    Preliminary result Component Value   Culture No anaerobic organisms isolated after 2 days  [P]                01/26/2024 0756 01/26/2024 1115 Gram Stain [[id number]]   Tissue from Toe, Right    Final result Component Value   Gram Stain Result No organisms seen   Gram Stain Result 2+ WBC seen            01/26/2024 0756 01/28/2024 1219 Tissue Aerobic Bacterial Culture Routine [[id number]]    (Abnormal)   Tissue from Toe, Right    Preliminary result Component Value   Culture Culture in progress  [P]     1+ Staphylococcus aureus  [P]     Isolated in broth only Gram positive cocci in pairs and chains  [P]     On day 1 of incubation        Susceptibility        Staphylococcus aureus      KAITLIN      Clindamycin 0.25 ug/mL Susceptible      Daptomycin 0.5 ug/mL Susceptible      Doxycycline <=0.5 ug/mL Susceptible      Erythromycin <=0.25 ug/mL Susceptible      Gentamicin <=0.5 ug/mL Susceptible      Oxacillin 0.5 ug/mL Susceptible  [1]       Tetracycline <=1 ug/mL Susceptible      Trimethoprim/Sulfamethoxazole <=0.5/9.5 ug/mL Susceptible      Vancomycin <=0.5 ug/mL Susceptible                   [1]  Oxacillin susceptible isolates are susceptible to cephalosporins (example: cefazolin and cephalexin) and beta lactam combination agents. Oxacillin resistant isolates are resistant to these agents.                   01/24/2024 1744 01/27/2024 1946 Blood Culture Arm, Right [[id number]]   Blood from Arm, Right    Preliminary result Component Value   Culture No growth after 3 days  [P]                01/24/2024 1744 01/27/2024 1946 Blood Culture Arm, Right [[id number]]   Blood from Arm, Right    Preliminary result Component Value   Culture No growth after 3 days  [P]                      Most Recent TSH and T4:No lab results found.  Most Recent Urinalysis:  Recent Labs   Lab Test 02/19/22  0251   COLOR  Straw   APPEARANCE Clear   URINEGLC Negative   URINEBILI Negative   URINEKETONE Negative   SG 1.014   UBLD Negative   URINEPH 7.0   PROTEIN Negative   NITRITE Negative   LEUKEST Negative   RBCU 1   WBCU <1     Most Recent ESR & CRP:  Recent Labs   Lab Test 01/25/24  0703 01/24/24  1644   SED 61*  --    CRPI  --  192.53*   ,   Results for orders placed or performed during the hospital encounter of 01/24/24   XR Toe Right G/E 2 Views    Narrative    EXAM: XR TOE RIGHT G/E 2 VIEWS  LOCATION: [facility]  DATE: 1/24/2024    INDICATION: ulcer  COMPARISON: None.      Impression    IMPRESSION: Diffuse osseous demineralization. Prior partial amputation of the first toe through the neck of the first proximal phalanx. Soft tissue wound at the distal tip of the second digit with focal demineralization and probable cortical bone loss   involving the tuft of the distal phalanx suggestive of osteomyelitis.     Mild to moderate degenerative arthrosis involving multiple joints of the midfoot and forefoot. No definite fracture, however, degree of osseous demineralization limits evaluation for nondisplaced fracture.    NOTE: ABNORMAL REPORT    THE DICTATION ABOVE DESCRIBES AN ABNORMALITY FOR WHICH FOLLOW-UP IS NEEDED.    MR Foot Right w/o & w Contrast    Narrative    EXAM: MR FOOT RIGHT W/O AND W CONTRAST  LOCATION: [facility]  DATE: 1/25/2024    INDICATION: History of amputation of the distal phalanx and distal aspect of the proximal phalanx of the right great toe. Patient presents with worsening ulcer of the right second toe suspicious for possible underlying osteomyelitis. Evaluate for   osteomyelitis right second toe.  COMPARISON: MRI 10/19/2023. Right foot radiographs 10/21/2023, 10/19/2023 and 01/24/2024.  TECHNIQUE: Routine. Additional postgadolinium T1 sequences were obtained.  IV CONTRAST: 10 mL Gadavist.    FINDINGS:     JOINTS AND BONES:   -Abnormal T1 signal with cortical  destruction involving the distal and middle phalanges of the right second toe with associated edema. Findings compatible with osteomyelitis involving the right second toe middle and distal phalanges. Mild reactive edema   at the proximal phalanx right second toe. Amputation of the distal aspect of the proximal phalanx of the right great toe. Very subtle early confluent decreased T1 signal within the remaining proximal phalanx with abnormal T2 signal indicating edema. Very   early osteomyelitis at this location cannot be excluded.    -No definitive evidence for osteomyelitis involving the right third or fourth toes.    -Area of confluent decreased T1 signal involving the distal aspect of the proximal phalanx of the right fifth toe with mild adjacent edema. There may be some early cortical destruction of this toe. Findings suspicious for possible early osteomyelitis   involving the proximal phalanx of the right fifth toe. However, healing fracture could also have this appearance as there was a prior fracture noted at this location on the radiograph of 11/15/2023. The cortex is irregular on the corresponding   radiograph.    -Degenerative osteoarthrosis right first MTP joint. Scattered areas of degenerative changes involving the right second, third, fourth and fifth MTP joints and the right midfoot. This is greatest at the articulation of the talus with medial cuneiform.    TENDONS:   -No tendon tear, tendinopathy, or tenosynovitis.     LIGAMENTS:   -Lisfranc ligament: Intact. No subluxation.    MUSCLES AND SOFT TISSUES:   -Marked subcutaneous edema diffusely throughout the right foot compatible with cellulitis with skin thickening.    -Soft tissue wound along the dorsal aspect of the right second toe distally with nonenhancing serpiginous track extending back to the level of the osseous structures up to the level of the proximal phalanx of the right second toe.    -No drainable fluid collection.      Impression     IMPRESSION:  1.  Osteomyelitis with cortical destruction and abnormal confluent decreased T1 signal involving the mid and distal phalanges of the right second toe. Overlying soft tissue ulceration with sinus tract extending back to the level of the dorsal aspect of   the distal portion of the proximal phalanx of the right second toe.    2.  Subtle decreased confluent T1 signal involving the surgical margin of the proximal phalanx of the right great toe with mild T2 signal. This may represent reactive edema within the margin of the surgical area of the proximal phalanx of the right great   toe versus very early osteomyelitis. Attention to this on follow-up is recommended.    3.  Confluent decreased signal abnormality involving the distal aspect of the proximal phalanx of the right fifth toe with normal appearance to the mid and distal phalanges of the right fifth toe. Associated edema at this location with post gadolinium   enhancement. Review of the prior radiographs demonstrate irregularity to the cortex of the distal aspect of phalange on the study of 01/24/2024; however, on the prior radiograph from 11/15/2023 there is a healing fracture at this location. These   findings, in the absence of a soft tissue wound at this location, likely reflect a healing fracture of the proximal phalanx. It would be difficult to exclude very early osteomyelitis.    4.  Diffuse subcutaneous edema right foot.    5.  Degenerative changes right first MTP joint and intertarsal articulations of the right foot.   US ANA MARIA Doppler No Exercise    Narrative    ULTRASOUND ANA MARIA DOPPLER NO EXERCISE, 1-2 LEVELS, BILATERAL January 25, 2024 12:24 PM     HISTORY: Right foot ulcer, evaluate for PAD.    COMPARISON: None.    FINDINGS:  Right ANA MARIA:   PT: 155, index of 1.23.  DP: 146, index of 1.16.    Left ANA MARIA:   PT: Noncompressible.  DP: Noncompressible.     Right Digital brachial index: Unable to obtain due to overlying  bandages. Fourth digit  evaluated, unable to obtain pressure.  Left Digital Brachial index: 118, index of 0.94.    Waveforms: Both femoral, popliteal, posterior tibial, and dorsalis  pedis waveforms appear mostly triphasic/biphasic. Left first digital  waveform appears intact. Right fourth digital waveform may be mildly  reduced.      Impression    IMPRESSION:   1. Some limitations of exam given calcified noncompressible arteries  and unable to obtain TBI values in the right lower extremity.  2. Waveforms and right ANA MARIA values appear relatively intact.  3. Unable to obtain resting ANA MARIA value of the left lower extremity due  to noncompressible arteries. TBI is intact, as are waveforms.    ANA MARIA CRITERIA:  >1.4 NC  0.95-1.4 Normal  0.90 - 0.94 Mild  0.5 - 0.89 Moderate  0.2 - 0.49 Severe  <0.2 Critical    DONITA ROBERTS MD         SYSTEM ID:  [id number]       Discharge Medications   Current Discharge Medication List        START taking these medications    Details   amoxicillin-clavulanate (AUGMENTIN) 875-125 MG tablet Take 1 tablet by mouth 2 times daily for 7 days  Qty: 14 tablet, Refills: 0    Associated Diagnoses: Osteomyelitis of right foot, unspecified type (H); Sepsis, due to unspecified organism, unspecified whether acute organ dysfunction present (H)      aspirin 81 MG EC tablet Take 1 tablet (81 mg) by mouth daily for 60 days  Qty: 60 tablet, Refills: 0    Associated Diagnoses: Osteomyelitis of right foot, unspecified type (H)           CONTINUE these medications which have NOT CHANGED    Details   allopurinol (ZYLOPRIM) 100 MG tablet Take 200 mg by mouth daily      apixaban ANTICOAGULANT (ELIQUIS) 5 MG tablet Take 1 tablet (5 mg) by mouth 2 times daily  Qty: 180 tablet, Refills: 0    Associated Diagnoses: Paroxysmal atrial fibrillation (H)      balsalazide (COLAZAL) 750 MG capsule Take 4,500 mg by mouth daily      Calcium Carbonate Antacid (MATHEUS-SELTZER HEARTBURN PO) Take by mouth as needed      diphenhydrAMINE-acetaminophen (TYLENOL  PM)  MG tablet Take 3 tablets by mouth at bedtime      ferrous sulfate (FE TABS) 325 (65 Fe) MG EC tablet Take 325 mg by mouth daily      furosemide (LASIX) 20 MG tablet Take 20 mg by mouth daily      JARDIANCE 25 MG TABS tablet Take 25 mg by mouth daily      lisinopril (ZESTRIL) 20 MG tablet Take 20 mg by mouth daily      metoprolol succinate ER (TOPROL XL) 25 MG 24 hr tablet Take 25 mg by mouth daily      omega 3 1000 MG CAPS Take 1,000 mg by mouth daily      pantoprazole (PROTONIX) 40 MG EC tablet Take 40 mg by mouth daily      pioglitazone (ACTOS) 30 MG tablet Take 30 mg by mouth daily      potassium chloride haja er (K-DUR) Take 10 mEq by mouth daily      sertraline (ZOLOFT) 100 MG tablet Take 150 mg by mouth daily      cephALEXin (KEFLEX) 500 MG capsule Take 1 capsule (500 mg) by mouth 2 times daily for 10 days  Qty: 20 capsule, Refills: 0    Associated Diagnoses: Chronic toe ulcer, left, with fat layer exposed (H); Chronic toe ulcer, right, with fat layer exposed (H); Chronic ulcer of right leg with fat layer exposed (H)           Allergies   Allergies   Allergen Reactions    Amoxicillin Rash

## 2024-01-29 NOTE — PROGRESS NOTES
Clinic Care Coordination Contact  Clinic Care Coordination Contact  OUTREACH    Referral Information:  Referral Source: IP Report: Pt was hospitalized at [facility] from 1/24-1/28 with complications related to osteomyelitis of toe and status post right second digit amputation 1/26/2024.    Primary Diagnosis: Orthopedic    Chief Complaint   Patient presents with    Clinic Care Coordination - Post Hospital     SW Outreach        [place] Utilization:   Clinic Utilization  Difficulty keeping appointments:: No  Compliance Concerns: No  No-Show Concerns: No  No PCP office visit in Past Year: No  Utilization      No Show Count (past year)  0             ED Visits  2             Hospital Admissions  2                    Current as of: 1/29/2024  9:15 AM                Clinical Concerns:  Current Medical Concerns:  Osteomyelitis of toe and toe amputations,   GERD, A-fib on Eliquis, DM type II, HAKEEM on CPAP, HTN, hx of gastric bypass, depression.  Current Behavioral Concerns: None noted    Education Provided to patient: SW role and recommended follow-up       Medication Management:  Medication review status: Pt indicated that he was prescribed an antibiotic upon discharge from the hospital that he is taking.    Functional Status:  Dependent ADLs:: Ambulation-walker  Dependent IADLs:: Transportation  Bed or wheelchair confined:: No  Mobility Status: Independent w/Device  Fallen 2 or more times in the past year?: No  Any fall with injury in the past year?: No    Living Situation:  Current living arrangement:: I live in a private home, I live alone  Type of residence:: Private home - stairs    Lifestyle & Psychosocial Needs:  Pt is a 63 yr old single male who lives in [place]. 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 Pt has CHIRAG MA. Pt has support from his sister. Chart notes indicate pt has a hx of heavy alcohol use, drinking approximately 1 pint of Maki every 2 days. Pt lives alone but does have a 1 yr old Beagle  mix puppy that his neighbors have been helping him care for while he was in the hospital.     Social Determinants of Health     Food Insecurity: Low Risk  (1/29/2024)    Food Insecurity     Within the past 12 months, did you worry that your food would run out before you got money to buy more?: No     Within the past 12 months, did the food you bought just not last and you didn t have money to get more?: No   Depression: Not at risk (11/15/2023)    PHQ-2     PHQ-2 Score: 0   Housing Stability: Low Risk  (1/29/2024)    Housing Stability     Do you have housing? : Yes     Are you worried about losing your housing?: No   Tobacco Use: Low Risk  (1/26/2024)    Patient History     Smoking Tobacco Use: Never     Smokeless Tobacco Use: Never     Passive Exposure: Not on file   Financial Resource Strain: Low Risk  (1/29/2024)    Financial Resource Strain     Within the past 12 months, have you or your family members you live with been unable to get utilities (heat, electricity) when it was really needed?: No   Alcohol Use: Not on file   Transportation Needs: Low Risk  (1/29/2024)    Transportation Needs     Within the past 12 months, has lack of transportation kept you from medical appointments, getting your medicines, non-medical meetings or appointments, work, or from getting things that you need?: No   Physical Activity: Not on file   Interpersonal Safety: Not on file   Stress: Not on file   Social Connections: Not on file     Diet:: Diabetic diet  Inadequate nutrition (GOAL):: No  Tube Feeding: No  Inadequate activity/exercise (GOAL):: No  Significant changes in sleep pattern (GOAL): No  Transportation means:: Regular car, Family     Mental health DX:: Yes  Mental health DX how managed:: Medication  Mental health management concern (GOAL):: No  Informal Support system:: Family, Neighbors, Friends        Resources and Interventions:  Current Resources: Home health care nurse to help manage wound  Skilled Home Care  Services: Skilled Nursing  Community Resources: Home Care, Financial/Insurance  Supplies Currently Used at Home: Wound Care Supplies  Equipment Currently Used at Home: shower chair  Employment Status: disabled       Advance Care Plan/Directive  Advanced Care Plans/Directives on file:: No    Call placed to pt to check in on how he has been doing since his discharge home from the hospital. Pt reports that he is feeling great. He feels he has more energy than he has had in months and he wonders if the toe that was infected/amputated had been causing the low energy. Pt also reports that he thinks he is feeling better because he has lost weight and is down to about 225#. He notes that part of the weight loss has been his new dentures and having difficulty eating as much.    Pt reports that he has a wound care nurse through [facility] that is coming 3x per week to help change his dressing and keep his wound clean. Pt has follow up with PCP Dr. Mancuso on 2/7. Writer asked if pt has scheduled followed up with surgery team as recommended in his discharge instructions, pt stated that he had not, he wasn't sure this was necessary as he has a home care nurse and has upcoming appointments at the wound care clinic. Writer did explain reasoning behind appointment and encouraged him to call to schedule. Pt stated that he has his discharge paperwork and will discuss with the home care nurse tomorrow and plan to call and schedule. Pt sounded to be in good spirits and denied having add'l questions/concerns for SW at this time.     Referrals Placed: None    Patient/Caregiver understanding: Yes       Future Appointments                In 4 days Rosalina Estrada NP [facility]    In 1 week [id number]    In 3 weeks Rosalina Estrada NP [facility]    In 1 month Rosalina Estrada NP M [facility]  [facility] SORIN Brian [facility]            Plan: [facility] will not plan further outreach at this time.    Sarah Allen [facility]  Social Work Care Coordinator  Phone: [phone]

## 2024-01-30 NOTE — TELEPHONE ENCOUNTER
Returned call to patient. MRI can be cancelled as far as Rosalina Estrada NP knows. Dr. Diallo may decide otherwise and will leave that decision to her. Will cancel the MRI for now until follow up with Dr. Diallo.

## 2024-02-02 NOTE — PATIENT INSTRUCTIONS
Fer Myles      1960    A DME order was not completed because the supplies are ordered by home care or at a care facility     Dressing changes outside of clinic are being performed by Home Care     [facility] [place] Phone: [phone] Fax: [phone] New referrals [phone]     Do not submerge your feet in any water. Keep them clean and dry.      PLAN  -Please call the scheduling  to schedule your MRI appointment at [facility]: [phone]  -Antibiotics have been sen to your pharmacy. Take as directed. Take with food.   -If you have fevers or chills go to the ED for evaluation   -Call us if the drainage on your right leg continues to have green drainage.   -Offload your foot as much as possible      Wound Dressing Change: right dorsal toe 2 and left dorsal toe 2  - Wash your hands with soap and water before you begin your dressing change and  prepare a clean surface for dressings.  - Cleanse with mild unscented soap (such as Cetaphil, Cerave or Dove) and water  - Apply small amount of VASHE on gauze, lay into wound bed, let sit for 10 minutes, remove gauze (do not rinse)  - If able, apply CriticAid paste to skin around wounds & to small fissures under right toe 2 and 5;  - Apply a pea sized amount of  iodosorb to wound beds  - Cover with 1 medipore plus pad  to each wound or use gauze and roll gauze with medipore tape   - Wear surgical shoe (to offload pressure on toe 2)  -Change three times per week with home care     Wound Dressing Change: right medial toe 1  - Wash your hands with soap and water before you begin your dressing change and prepare a clean surface for dressings.  - Cleanse with mild unscented soap (such as Cetaphil, Cerave or Dove) and water  - Apply small amount of VASHE on gauze, lay into wound bed, let sit for 10 minutes, remove gauze (do not rinse)  - If able, apply CriticAid paste to skin around wounds & to small fissures under right toe 2 and 5;  -  Apply a pea sized amount of wound'res gel to wound bed   - Cover with 1 medipore plus pad or use gauze and roll gauze with medipore tape   - Wear surgical shoe (to offload pressure on toe 2)  -Change three times per week with home care        Wound Dressing Change: right lateral lower leg:  - Cleanse with mild unscented soap (such as Cetaphil, Cerave or Dove) and water  - Apply small amount of VASHE on gauze, lay into wound bed, let sit for 10 minutes,  remove gauze (do not rinse)  - Apply thick, high-quality moisturizer to intact skin (such as CeraVe, Eucerin,  Aquaphor or Vanicream)  - Apply ~1/8th piece of 4x5 Hydrofera Blue Ready-Transfer  - Apply 1/4 5x9 ABD pad   -Secure with roll gauze and medipore tape  - Apply Spandage size 7/MTSpandage size 4 from toes to knee  - Apply spandagrip size G from toes to behind the knee  -Change three times per week with home care     Left leg skin care, daily in the morning:  - Cleanse with mild unscented soap (such as Cetaphil, Cerave or Dove) and water  - Apply thick, high-quality moisturizer to intact skin (such as CeraVe, Eucerin,  Aquaphor or Vanicream)  - Apply Spandigrip size F or compression stocking from home (remove at night)     Walk/exercise/foot pumps as much as you can, as you are able.  Elevate: your legs above the level of your heart for 30 minutes: approximately 2-3  times each day  Ways to do this:  - Lay on the couch or your bed and prop your legs up on pillows  - Recline back as far as you can go in your recliner and prop your legs on pillows.        Compression:   Your compression is Spandagrip G and can be removed at night and put back on first thing in the morning.   Please remove compression dressing if toes turn blue and/or tingle and can not be relieved by raising the leg for one hour. If unable to reapply in the morning, keep compression on until next dressing change.     Walk as much as you can, as you are able. Whenever you sit raise your ankle  above your hips to promote wound healing.            Low sodium, high protein diet:  A diet high in protein is important for wound healing, we recommend getting 90  grams of protein per day. Taking protein shakes or bars are a good way to get extra  protein in your diet.  Good sources of protein:  Pork 26g per 3 oz  Whey protein powder - 24g per scoop (on average)  Greek yogurt - 23g per 8oz  Chicken or Turkey - 23g per 3oz  Fish - 20-25g per 3oz  Beef - 18-23g per 3oz  Navy beans - 20g per cup  Cottage cheese - 14g per 1/2 cup  Lentils - 13g per 1/4 cup  Beef jerky 13g per 1oz  2% milk - 8g per cup  Peanut butter - 8g per 2 tablespoons  Eggs - 6g per egg  Mixed nuts - 6g per 2oz        Keep blood sugars below 150 and A1C below 7       Main Provider: Rosalina Estrada NP February 2, 2024    Call us at [phone] if you have any questions about your wounds, have redness or swelling around your wound, have a fever of 101 degrees Fahrenheit or greater or if you have any other problems or concerns. We answer the phone Monday through Friday 8 am to 4 pm, please leave a message as we check the voicemail frequently throughout the day.     If you had a positive experience please indicate that on your patient satisfaction survey form that [facility] will be sending you.    It was a pleasure meeting with you today.  Thank you for allowing me and my team the privilege of caring for you today.  YOU are the reason we are here, and I truly hope we provided you with the excellent service you deserve.  Please let us know if there is anything else we can do for you so that we can be sure you are leaving completely satisfied with your care experience.      If you have any billing related questions please call the [facility] office at [phone]. The clinic staff does not handle billing related matters.    If you are scheduled to have a follow up appointment, you will receive a reminder call the day before your visit. On  the appointment day please arrive 15 minutes prior to your appointment time. If you are unable to keep that appointment, please call the clinic to cancel or reschedule. If you are more than 10 minutes late or greater for your scheduled appointment time, the clinic policy is that you may be asked to reschedule.

## 2024-02-02 NOTE — PROGRESS NOTES
[facility]    ASSESSMENT:   Chronic ulcer of right leg with fat layer exposed (H)-slightly improved   Recent right 2nd toe amputation-concerned given breakdown and bogginess of the incision site, drainage is serous  Ulceration of left toe, 2nd digit, improved.       PLAN/DISCUSSION:   Recently had amputation of right 2nd toe, reports that the incision site continues to worsen slightly each day. No follow-up yet with Dr. Diallo, in previous notes inpatient he was to be seen 1-2 weeks post toe amputation  Left foot ulceration improved and clean wound bed  Right lateral calf wound slightly  and measuring smaller.   Wound care plan: Betadine to bilateral lower extremity digits. On the right follow with hydrofera. For the calf wound ioplex and ABD. Dressing will be performed by home care. See bottom of note for detailed wound care and patient instructions  Dietary recommendations discussed, see AVS   Denies fevers and chills, discussed when to seek emergency care.   Continue to wear surgical shoe.     HISTORY OF PRESENT ILLNESS:   Fre Myles is a 63 year old male with a history of of a right foot hallux amputation on 10/21/2023 for osteomyelitis, hx of diabetes, hypertension, chronic lower extremity edema who presents today with numerous wounds, including previous surgical incision of the right hallux that dehisced. He also has a wound that started mid-December 2023 on his right second toe in addition to his right lateral calf. The right second toe wound continues to drain heavily. Denies fevers, chills, infection. He has not been wearing surgical shoe, no compression for the last few months due to generalized weakness.      Hx of diabetes with bilateral lower extremity neuropathy.   Patient Active Problem List   Diagnosis    paroxysmal atrial fib    Alcohol abuse    Hypertension    Morbid obesity (H)    Melena    Gastrointestinal hemorrhage, unspecified gastrointestinal hemorrhage type     Osteomyelitis of great toe of right foot (H)    Acute posthemorrhagic anemia    Chronic toe ulcer, right, with fat layer exposed (H)    Chronic ulcer of right leg with fat layer exposed (H)    Chronic toe ulcer, left, with fat layer exposed (H)    Osteomyelitis of right foot, unspecified type (H)    Sepsis, due to unspecified organism, unspecified whether acute organ dysfunction present (H)       MEDICATIONS:   Current Outpatient Medications   Medication    allopurinol (ZYLOPRIM) 100 MG tablet    amoxicillin-clavulanate (AUGMENTIN) 875-125 MG tablet    apixaban ANTICOAGULANT (ELIQUIS) 5 MG tablet    aspirin 81 MG EC tablet    balsalazide (COLAZAL) 750 MG capsule    Calcium Carbonate Antacid (MATHEUS-SELTZER HEARTBURN PO)    diphenhydrAMINE-acetaminophen (TYLENOL PM)  MG tablet    ferrous sulfate (FE TABS) 325 (65 Fe) MG EC tablet    furosemide (LASIX) 20 MG tablet    JARDIANCE 25 MG TABS tablet    lisinopril (ZESTRIL) 20 MG tablet    metoprolol succinate ER (TOPROL XL) 25 MG 24 hr tablet    omega 3 1000 MG CAPS    pantoprazole (PROTONIX) 40 MG EC tablet    pioglitazone (ACTOS) 30 MG tablet    potassium chloride haja er (K-DUR)    sertraline (ZOLOFT) 100 MG tablet     No current facility-administered medications for this encounter.       VITALS: BP (!) 140/88 (BP Location: Left arm, Patient Position: Sitting)   Pulse 66   Temp 97.5  F (36.4  C) (Temporal)      PHYSICAL EXAM:  GENERAL: Patient is alert and oriented and in no acute distress  INTEGUMENTARY:   Wound (Active)   Wound Bed Black 01/26/24 1602   Drainage Amount None 01/26/24 1602   Dressing Foam;Per Plan of Care 01/26/24 1602   Dressing Status Changed 01/28/24 1200       Wound (used by OP [facility] only) 01/04/24 1228 Right dorsal 2 toe neuropathic ulcer (Active)   Thickness/Stage full thickness 02/02/24 1228   Base hypergranulation;slough 02/02/24 1228   Periwound macerated;pink;swelling 02/02/24 1228   Periwound Temperature warm 02/02/24 1228   Periwound  Skin Turgor soft 02/02/24 1228   Edges open 02/02/24 1228   Length (cm) 2.5 02/02/24 1228   Width (cm) 1.5 02/02/24 1228   Depth (cm) 0.1 02/02/24 1228   Wound (cm^2) 3.75 cm^2 02/02/24 1228   Wound Volume (cm^3) 0.38 cm^3 02/02/24 1228   Wound healing % 43.61 02/02/24 1228   Undermining [Depth (cm)/Location] 12 o'clock / 0.5cm 01/19/24 1107   Drainage Characteristics/Odor serosanguineous 02/02/24 1228   Drainage Amount large 02/02/24 1228   Care, Wound debrided 01/19/24 1107       Wound (used by OP [facility] only) 01/04/24 1228 Right medial 1 toe surgical (Active)   Thickness/Stage full thickness 02/02/24 1228   Base slough;pink 02/02/24 1228   Periwound intact 02/02/24 1228   Periwound Temperature cool 02/02/24 1228   Periwound Skin Turgor firm 02/02/24 1228   Edges open 02/02/24 1228   Length (cm) 0.4 01/19/24 1107   Width (cm) 0.4 01/19/24 1107   Depth (cm) 0.2 01/19/24 1107   Wound (cm^2) 0.16 cm^2 01/19/24 1107   Wound Volume (cm^3) 0.03 cm^3 01/19/24 1107   Wound healing % 87.5 01/19/24 1107   Drainage Characteristics/Odor serosanguineous 02/02/24 1228   Drainage Amount moderate 02/02/24 1228   Care, Wound debrided;chemical cautery applied 01/19/24 1107       Wound (used by OP [facility] only) 01/04/24 1229 Right lateral;lower leg ulceration, venous (Active)   Thickness/Stage full thickness 02/02/24 1228   Base pink;slough 02/02/24 1228   Periwound pink;swelling 02/02/24 1228   Periwound Temperature warm 02/02/24 1228   Periwound Skin Turgor firm 02/02/24 1228   Edges open 02/02/24 1228   Length (cm) 2.6 02/02/24 1228   Width (cm) 1.7 02/02/24 1228   Depth (cm) 0.3 02/02/24 1228   Wound (cm^2) 4.42 cm^2 02/02/24 1228   Wound Volume (cm^3) 1.33 cm^3 02/02/24 1228   Wound healing % 44.75 02/02/24 1228   Drainage Characteristics/Odor serosanguineous 02/02/24 1228   Drainage Amount moderate 02/02/24 1228   Care, Wound debrided 02/02/24 1228       Wound (used by OP [facility] only) 01/04/24 1321 Left dorsal 2 toe neuropathic ulcer  (Active)   Thickness/Stage full thickness 02/02/24 1228   Base red;slough 02/02/24 1228   Periwound dry 02/02/24 1228   Periwound Temperature warm 02/02/24 1228   Periwound Skin Turgor soft 02/02/24 1228   Edges open 02/02/24 1228   Length (cm) 0.3 02/02/24 1228   Width (cm) 0.6 02/02/24 1228   Depth (cm) 0.1 02/02/24 1228   Wound (cm^2) 0.18 cm^2 02/02/24 1228   Wound Volume (cm^3) 0.02 cm^3 02/02/24 1228   Wound healing % 96.57 02/02/24 1228   Drainage Characteristics/Odor serosanguineous 02/02/24 1228   Drainage Amount moderate 02/02/24 1228   Care, Wound debrided 01/19/24 1107       Incision/Surgical Site 10/31/22 Left Shoulder (Active)       Incision/Surgical Site 01/26/24 Anterior;Right Toe (Comment  which one) (Active)   Incision Assessment UTV 01/28/24 0741   Closure MANNY 01/28/24 0741   Incision Drainage Amount None 01/28/24 0741   Dressing Intervention New dressing applied 01/28/24 1200                   PROCEDURES: 4% topical lidocaine was applied to the wound by the nursing staff. Patient was determined to be capable of making their own medical decisions and informed consent was obtained. Using a curette a selective debridement of non-viable tissue was performed of <20cm2. Hemostasis was achieved with pressure. The patient tolerated the procedure well.        PATIENT INSTRUCTIONS      Further instructions from your care team         Fer Myles      1960    A DME order was not completed because the supplies are ordered by home care or at a care facility     Dressing changes outside of clinic are being performed by Home Care     [facility] Phone: [phone] Fax: [phone] New referrals [phone]          PLAN  -Monitor your blood sugars closely and take more often. Think about holding off of metformin on the evening doses so your blood sugars dont continue to drop. Call your primary today to discuss your blood sugar levels and your metformin dosage.   -Do not submerge your feet in  any water. Keep them clean and dry.   -If you have fevers or chills go to the ED for evaluation   -Offload your foot as much as possible. Use surgical shoe. Continue to offload on heel when walking.      Wound Dressing Change: left dorsal toe 2  - Wash your hands with soap and water before you begin your dressing change and prepare a clean surface for dressings.  - Cleanse with mild unscented soap (such as Cetaphil, Cerave or Dove) and water  - Apply betadine to incision and left toe 2  - Cover with 1 4x4 gauze   - Secure with roll gauze and medipore tape  - Change three times per week with home care        Wound Dressing Change: right dorsal toe 2   - Wash your hands with soap and water before you begin your dressing change and prepare a clean surface for dressings.  - Cleanse with mild unscented soap (such as Cetaphil, Cerave or Dove) and water  - Apply betadine to incision and left toe 2  - Apply  1/8th piece of 4x5 hydrofera blue to wound bed  - Cover with 1 4x4 gauze   - Secure with 1 4x4 gauze and roll gauze with medipore tape   - Wear surgical shoe (to offload pressure on toe 2). Try and walk on your heel.   - Change three times per week with home care       Wound Dressing Change: right lateral lower leg:  - Cleanse with mild unscented soap (such as Cetaphil, Cerave or Dove) and water  - Apply thick, high-quality moisturizer to intact skin (such as CeraVe, Eucerin,Aquaphor or Vanicream)  - Apply ~1/8th piece of 4x5 Iopex to wound bed  - Apply 1/4 5x9 ABD pad   - Secure with roll gauze and medipore tape  - Apply Spandage size 7/MTSpandage size 4 from toes to knee  - Apply spandagrip size G from toes to behind the knee  -Change three times per week with home care     Left leg skin care, daily in the morning:  - Cleanse with mild unscented soap (such as Cetaphil, Cerave or Dove) and water  - Apply thick, high-quality moisturizer to intact skin (such as CeraVe, Eucerin, Aquaphor or Vanicream)  - Apply Spandigrip  size F or compression stocking from home (remove at night)     Walk/exercise/foot pumps as much as you can, as you are able.  Elevate: your legs above the level of your heart for 30 minutes: approximately 2-3 times each day Ways to do this:  - Lay on the couch or your bed and prop your legs up on pillows  - Recline back as far as you can go in your recliner and prop your legs on pillows.        Compression:   Your compression is Spandagrip G and can be removed at night and put back on first thing in the morning.   Please remove compression dressing if toes turn blue and/or tingle and can not be relieved by raising the leg for one hour. If unable to reapply in the morning, keep compression on until next dressing change.     Walk as much as you can, as you are able. Whenever you sit raise your ankle above your hips to promote wound healing.            Low sodium, high protein diet:  A diet high in protein is important for wound healing, we recommend getting 90  grams of protein per day. Taking protein shakes or bars are a good way to get extra  protein in your diet.  Good sources of protein:  Pork 26g per 3 oz  Whey protein powder - 24g per scoop (on average)  Greek yogurt - 23g per 8oz  Chicken or Turkey - 23g per 3oz  Fish - 20-25g per 3oz  Beef - 18-23g per 3oz  Navy beans - 20g per cup  Cottage cheese - 14g per 1/2 cup  Lentils - 13g per 1/4 cup  Beef jerky 13g per 1oz  2% milk - 8g per cup  Peanut butter - 8g per 2 tablespoons  Eggs - 6g per egg  Mixed nuts - 6g per 2oz        Keep blood sugars below 150 and A1C below 7       Main Provider: Rosalina Estrada NP February 2, 2024    Call us at [phone] if you have any questions about your wounds, have redness or swelling around your wound, have a fever of 101 degrees Fahrenheit or greater or if you have any other problems or concerns. We answer the phone Monday through Friday 8 am to 4 pm, please leave a message as we check the voicemail frequently throughout the  day.     If you had a positive experience please indicate that on your patient satisfaction survey form that [facility] will be sending you.    It was a pleasure meeting with you today.  Thank you for allowing me and my team the privilege of caring for you today.  YOU are the reason we are here, and I truly hope we provided you with the excellent service you deserve.  Please let us know if there is anything else we can do for you so that we can be sure you are leaving completely satisfied with your care experience.      If you have any billing related questions please call the [facility] Business office at [phone]. The clinic staff does not handle billing related matters.    If you are scheduled to have a follow up appointment, you will receive a reminder call the day before your visit. On the appointment day please arrive 15 minutes prior to your appointment time. If you are unable to keep that appointment, please call the clinic to cancel or reschedule. If you are more than 10 minutes late or greater for your scheduled appointment time, the clinic policy is that you may be asked to reschedule.          Electronically signed by Rosalina Estrada CNP on February 2, 2024

## 2024-02-02 NOTE — DISCHARGE INSTRUCTIONS
Fer Myles      1960    A DME order was not completed because the supplies are ordered by home care or at a care facility     Dressing changes outside of clinic are being performed by Home Care     [facility] Phone: [phone] Fax: [phone] New referrals [phone]        PLAN  -Monitor your blood sugars closely and take more often. Think about holding off of metformin on the evening doses so your blood sugars dont continue to drop. Call your primary today to discuss your blood sugar levels and your metformin dosage.   -Do not submerge your feet in any water. Keep them clean and dry.   -If you have fevers or chills, or redness go to the ED for evaluation   -Offload your foot as much as possible. Use surgical shoe. Continue to offload on heel when walking.      Wound Dressing Change: left dorsal toe 2  - Wash your hands with soap and water before you begin your dressing change and prepare a clean surface for dressings.  - Cleanse with mild unscented soap (such as Cetaphil, Cerave or Dove) and water  - Apply betadine to wound bed  - Cover with 1 4x4 gauze   - Secure with roll gauze and medipore tape  - Change three times per week with home care        Wound Dressing Change: right dorsal toe 2 (surgical)  - Wash your hands with soap and water before you begin your dressing change and prepare a clean surface for dressings.  - Cleanse with mild unscented soap (such as Cetaphil, Cerave or Dove) and water  - Apply betadine to incision and left toe 2  - Apply  1/8th piece of 4x5 hydrofera blue to wound bed  - Cover with 1 4x4 gauze   - Secure with 1 4x4 gauze and roll gauze with medipore tape   - Wear surgical shoe (to offload pressure on toe 2). Try and walk on your heel.   - Change three times per week with home care       Wound Dressing Change: right lateral lower leg:  - Cleanse with mild unscented soap (such as Cetaphil, Cerave or Dove) and water  - Apply thick, high-quality moisturizer to  intact skin (such as CeraVe, Eucerin,Aquaphor or Vanicream)  - Apply ~1/8th piece of 4x5 Iopex to wound bed  - Apply 1/4 5x9 ABD pad   - Secure with roll gauze and medipore tape  - Apply Spandage size 7/MTSpandage size 4 from toes to knee  - Apply spandagrip size G from toes to behind the knee  -Change three times per week with home care     Left leg skin care, daily in the morning:  - Cleanse with mild unscented soap (such as Cetaphil, Cerave or Dove) and water  - Apply thick, high-quality moisturizer to intact skin (such as CeraVe, Eucerin, Aquaphor or Vanicream)  - Apply Spandigrip size F or compression stocking from home (remove at night)     Walk/exercise/foot pumps as much as you can, as you are able.  Elevate: your legs above the level of your heart for 30 minutes: approximately 2-3 times each day Ways to do this:  - Lay on the couch or your bed and prop your legs up on pillows  - Recline back as far as you can go in your recliner and prop your legs on pillows.        Compression:   Your compression is Spandagrip G and can be removed at night and put back on first thing in the morning.   Please remove compression dressing if toes turn blue and/or tingle and can not be relieved by raising the leg for one hour. If unable to reapply in the morning, keep compression on until next dressing change.     Walk as much as you can, as you are able. Whenever you sit raise your ankle above your hips to promote wound healing.            Low sodium, high protein diet:  A diet high in protein is important for wound healing, we recommend getting 90  grams of protein per day. Taking protein shakes or bars are a good way to get extra  protein in your diet.  Good sources of protein:  Pork 26g per 3 oz  Whey protein powder - 24g per scoop (on average)  Greek yogurt - 23g per 8oz  Chicken or Turkey - 23g per 3oz  Fish - 20-25g per 3oz  Beef - 18-23g per 3oz  Navy beans - 20g per cup  Cottage cheese - 14g per 1/2 cup  Lentils - 13g  per 1/4 cup  Beef jerky 13g per 1oz  2% milk - 8g per cup  Peanut butter - 8g per 2 tablespoons  Eggs - 6g per egg  Mixed nuts - 6g per 2oz        Keep blood sugars below 150 and A1C below 7       Main Provider: Rosalina Estrada NP February 2, 2024    Call us at [phone] if you have any questions about your wounds, have redness or swelling around your wound, have a fever of 101 degrees Fahrenheit or greater or if you have any other problems or concerns. We answer the phone Monday through Friday 8 am to 4 pm, please leave a message as we check the voicemail frequently throughout the day.     If you had a positive experience please indicate that on your patient satisfaction survey form that [facility] will be sending you.    It was a pleasure meeting with you today.  Thank you for allowing me and my team the privilege of caring for you today.  YOU are the reason we are here, and I truly hope we provided you with the excellent service you deserve.  Please let us know if there is anything else we can do for you so that we can be sure you are leaving completely satisfied with your care experience.      If you have any billing related questions please call the [facility] Business office at [phone]. The clinic staff does not handle billing related matters.    If you are scheduled to have a follow up appointment, you will receive a reminder call the day before your visit. On the appointment day please arrive 15 minutes prior to your appointment time. If you are unable to keep that appointment, please call the clinic to cancel or reschedule. If you are more than 10 minutes late or greater for your scheduled appointment time, the clinic policy is that you may be asked to reschedule.

## 2024-02-05 NOTE — DISCHARGE INSTRUCTIONS
Fer Myles      1960    A DME order was not completed because the supplies are ordered by home care or at a care facility    Dressing changes outside of clinic are being performed by [facility]     [facility] Phone: [phone] Fax: [phone] New referrals [phone]     PLAN:  Monitor your blood sugars closely and take more often.   Do not submerge your feet in any water. Keep them clean and dry.   If you have fevers or chills, or redness go to the ED for evaluation.   Offload your foot as much as possible. Use surgical shoe. Continue to offload on heel when walking.   Keep blood sugars below 150 and A1C below 7  Surgery performed 1/26/24  Keep leg dry with use of a cast protector ([facility] at [facility] or [facility])   Call and make an appointment with Dr. Diallo at [facility] 2/14/24 (# [phone])  Take Probiotics daily while on antibiotics     Wound Dressing Change: Left dorsal toe 2  - Wash your hands with soap and water before you begin your dressing change and prepare a clean surface for dressings.  - Cleanse with mild unscented soap (such as Cetaphil, Cerave or Dove) and water  - Apply betadine to wound bed  - Cover with 1 4x4 gauze   - Secure with roll gauze and medipore tape  - Change three times per week with home care     Wound Dressing Change: Right toe 2 (surgical)  - Wash your hands with soap and water before you begin your dressing change and prepare a clean surface for dressings.  - Cleanse with mild unscented soap (such as Cetaphil, Cerave or Dove) and water  - Apply betadine to incision and left toe 2  - Apply  1/8th piece of 4x5 hydrofera blue to wound bed  - Cover with 1 4x4 gauze   - Secure with 1 4x4 gauze and roll gauze with medipore tape   - Wear surgical shoe (to offload pressure on toe 2). Try and walk on your heel.   Change three times per week with home care     Wound Dressing Change: right lateral lower leg:  - Cleanse with mild unscented soap  (such as Cetaphil, Cerave or Dove) and water  - Apply thick, high-quality moisturizer to intact skin (such as CeraVe, Eucerin,Aquaphor or Vanicream)  - Apply ~1/8th piece of 4x5 Iopex to wound bed  - Apply 1/4 5x9 ABD pad   - Secure with roll gauze and medipore tape  - Apply Spandage size 7/MTSpandage size 4 from toes to knee  - Apply spandagrip size G from toes to behind the knee  Change three times per week with home care     Left leg skin care, daily in the morning:  - Cleanse with mild unscented soap (such as Cetaphil, Cerave or Dove) and water  - Apply thick, high-quality moisturizer to intact skin (such as CeraVe, Eucerin, Aquaphor or Vanicream)  - Apply Spandigrip size F or compression stocking from home (remove at night)     Walk/exercise/foot pumps as much as you can, as you are able.  Elevate: your legs above the level of your heart for 30 minutes: approximately 2-3 times each day Ways to do this:  - Lay on the couch or your bed and prop your legs up on pillows  - Recline back as far as you can go in your recliner and prop your legs on pillows.     Compression:   Your compression is Spandagrip G and can be removed at night and put back on first thing in the morning.   Please remove compression dressing if toes turn blue and/or tingle and can not be relieved by raising the leg for one hour. If unable to reapply in the morning, keep compression on until next dressing change.     Walk as much as you can, as you are able. Whenever you sit raise your ankle above your hips to promote wound healing.      Low sodium, high protein diet:  A diet high in protein is important for wound healing, we recommend getting 90  grams of protein per day. Taking protein shakes or bars are a good way to get extra  protein in your diet.  Good sources of protein:  Pork 26g per 3 oz  Whey protein powder - 24g per scoop (on average)  Greek yogurt - 23g per 8oz  Chicken or Turkey - 23g per 3oz  Fish - 20-25g per 3oz  Beef - 18-23g per  3oz  Navy beans - 20g per cup  Cottage cheese - 14g per 1/2 cup  Lentils - 13g per 1/4 cup  Beef jerky 13g per 1oz  2% milk - 8g per cup  Peanut butter - 8g per 2 tablespoons  Eggs - 6g per egg  Mixed nuts - 6g per 2oz         Main Provider: Jolanta Diallo D.P.M. February 5, 2024    Call us at [phone] if you have any questions about your wounds, have redness or swelling around your wound, have a fever of 101 degrees Fahrenheit or greater or if you have any other problems or concerns. We answer the phone Monday through Friday 8 am to 4 pm, please leave a message as we check the voicemail frequently throughout the day.     If you had a positive experience please indicate that on your patient satisfaction survey form that [facility] will be sending you.    It was a pleasure meeting with you today.  Thank you for allowing me and my team the privilege of caring for you today.  YOU are the reason we are here, and I truly hope we provided you with the excellent service you deserve.  Please let us know if there is anything else we can do for you so that we can be sure you are leaving completely satisfied with your care experience.      If you have any billing related questions please call the [facility] Business office at [phone]. The clinic staff does not handle billing related matters.    If you are scheduled to have a follow up appointment, you will receive a reminder call the day before your visit. On the appointment day please arrive 15 minutes prior to your appointment time. If you are unable to keep that appointment, please call the clinic to cancel or reschedule. If you are more than 10 minutes late or greater for your scheduled appointment time, the clinic policy is that you may be asked to reschedule.
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
## 2024-02-22 NOTE — DISCHARGE INSTRUCTIONS
"Fer Myles      1960    A DME order for supplies has been placed to [facility], [address]. If there are any issues with your order including not receiving the order please call [facility] at [phone] option 1. They can also provide a tracking number for you if you had supplies shipped to you.      Dressing changes outside of clinic are being performed by [facility]     [facility] Phone: [phone]; Fax: [phone]      PLAN:  Continue surgical shoe for now. Continue weight bear mostly on heel, but okay to gradually increase walking a little. If wound worsens, back off.  Keep blood sugars below 150 and A1C below 7 (great job with this!)  Purchase stocking radha at medical supply store or online, such as a cage stocking radha.  Call PCP today about right calf swelling to make sure they don't recommend an ultrasound to rule out DVT. If you develop shortness of breath or chest pain, go to ED/call 531.  Continue to keep leg dry in shower with use of cast protector.     Wound Dressing Change: Right toe 2 (surgical)  - Wash your hands with soap and water before you begin your dressing change and prepare a clean surface for dressings.  - Cleanse with mild unscented soap (such as Cetaphil, Cerave or Dove) and water  - Apply betadine to incision  - Apply 1/12 Endoform Antimicrobial 2x2\"  - Apply  1/12th piece of 4x5 Hydrofera Blue Ready-Transfer to wound bed  - Secure with Medipore tape  - Wear surgical shoe (to offload pressure on toe 2). Try and walk on your heel.   Change three times per week      Wound Dressing Change: right lateral lower leg:  - Cleanse with mild unscented soap (such as Cetaphil, Cerave or Dove) and water  - Apply small amount of VASHE on gauze, lay into wound bed, let sit for 10 minutes, remove gauze (do not rinse)   - Apply thick, high-quality moisturizer to intact skin on feet and legs (such as CeraVe, Eucerin,Aquaphor or Vanicream)  - " Apply ~1/8th piece of 4x5 Hydrofera Blue Ready-Transfer to wound bed  - Apply Spandage size 7/MTSpandage size 4 from toes to knee  - On top of Spandage, apply 1/4 5x9 ABD pad   - Apply 2-layer coflex wrap  Change 2 times per week     Left leg skin care, daily in the morning:  - If needed, cleanse with mild unscented soap (such as Cetaphil, Cerave or Dove) and water  - Apply thick, high-quality moisturizer to intact skin (such as CeraVe, Eucerin, Aquaphor or Vanicream)  - Apply Spandigrip size F or compression stocking from home (remove at night)    Elevate: your legs above the level of your heart for 30 minutes: 2-3 times each day   Ways to do this:  - Lay on the couch or your bed and prop your legs up on pillows  - Recline back as far as you can go in your recliner and prop your legs on pillows.    Low sodium, high protein diet:  A diet high in protein is important for wound healing, we recommend getting 90 grams of protein per day. Taking protein shakes or bars are a good way to get extra protein in your diet.    Compression:   Left leg compression stocking:  can be removed at night and put back on first thing in the morning.   Right leg 2-layer wrap:  leave on until home care nurse visits.  Please remove compression dressing if toes turn blue and/or tingle and can not be relieved by raising the leg for one hour.     It is very important to follow your healthcare providers instructions. Studies indicate when compression therapy is applied as directed, healing may occur faster and more completely. Do Not remove CoFlex unless your healthcare provider tells you to remove it.  Helpful Tips:  -Your bandage may feel tight at first. This is normal. When the swelling goes down, it will not feel as tight.  -Wear the stocking that comes with the system over the bandage to help you put on shoes and clothing  -Wear comfortable shoes and walk regularly. Walking will improve your circulation which will improve healing.  -Keep  your bandage dry.   -CoFlex maybe left on your leg for up to 7 days. After that your doctor or nurse will apply a new CoFlex.   -Call your doctor or nurse if the bandage gets wet, slips down or if you have pain, tingling, numbness or discoloration.       Main Provider: Rosalina Estrada NP February 22, 2024    Call us at [phone] if you have any questions about your wounds, have redness or swelling around your wound, have a fever of 101 degrees Fahrenheit or greater or if you have any other problems or concerns. We answer the phone Monday through Friday 8 am to 4 pm, please leave a message as we check the voicemail frequently throughout the day.     If you had a positive experience please indicate that on your patient satisfaction survey form that [facility] will be sending you.    It was a pleasure meeting with you today.  Thank you for allowing me and my team the privilege of caring for you today.  YOU are the reason we are here, and I truly hope we provided you with the excellent service you deserve.  Please let us know if there is anything else we can do for you so that we can be sure you are leaving completely satisfied with your care experience.      If you have any billing related questions please call the [facility] Business office at [phone]. The clinic staff does not handle billing related matters.    If you are scheduled to have a follow up appointment, you will receive a reminder call the day before your visit. On the appointment day please arrive 15 minutes prior to your appointment time. If you are unable to keep that appointment, please call the clinic to cancel or reschedule. If you are more than 10 minutes late or greater for your scheduled appointment time, the clinic policy is that you may be asked to reschedule.

## 2024-02-22 NOTE — PROGRESS NOTES
[facility]    ASSESSMENT:   Chronic toe ulcer, right, with fat layer exposed (H)-improved   Chronic ulcer of right leg with fat layer exposed (H)-slightly worse  Lower extremity edema, R worse than L  (L97.522) Chronic toe ulcer, left, with fat layer exposed (H)-healed      PLAN/DISCUSSION:   R leg/calf is significantly edematous. Discussed with Matias that we would encourage ED to rule out DVT. Matias states this has been ongoing for twenty years and is intermittent. He will discuss with his PCP over this. He denies shortness of breath or chest pain. Understands that if it is a DVT he needs to be treated accordingly.   Right calf wound worsened in terms of appearance. Discussed how edema can impede wound healing.  Incision continues to heal. Discussed continuation of surgical shoe. WB as tolerated.   Wound care plan: Hydrofera blue and 2 layer wrap to the Right leg. Wounddress and hydrofera blue to the incision. See bottom of note for detailed wound care and patient instructions  Dietary recommendations discussed, see AVS       HISTORY OF PRESENT ILLNESS:   Fer Myles is a 63 year old male with a history of of a right foot hallux amputation on 10/21/2023 for osteomyelitis, hx of diabetes, hypertension, chronic lower extremity edema who presents today with numerous wounds, including previous surgical incision of the right hallux that dehisced. He also has a wound that started mid-December 2023 on his right second toe in addition to his right lateral calf. The right second toe wound continues to drain heavily. Denies fevers, chills, infection. He has not been wearing surgical shoe, no compression for the last few months due to generalized weakness.      Hx of diabetes with bilateral lower extremity neuropathy.   Patient Active Problem List   Diagnosis    paroxysmal atrial fib    Alcohol abuse    Hypertension    Morbid obesity (H)    Melena    Gastrointestinal hemorrhage, unspecified  gastrointestinal hemorrhage type    Osteomyelitis of great toe of right foot (H)    Acute posthemorrhagic anemia    Chronic toe ulcer, right, with fat layer exposed (H)    Chronic ulcer of right leg with fat layer exposed (H)    Chronic toe ulcer, left, with fat layer exposed (H)    Osteomyelitis of right foot, unspecified type (H)    Sepsis, due to unspecified organism, unspecified whether acute organ dysfunction present (H)     MEDICATIONS:   Current Outpatient Medications   Medication    allopurinol (ZYLOPRIM) 100 MG tablet    apixaban ANTICOAGULANT (ELIQUIS) 5 MG tablet    balsalazide (COLAZAL) 750 MG capsule    Calcium Carbonate Antacid (MATHEUS-SELTZER HEARTBURN PO)    diphenhydrAMINE-acetaminophen (TYLENOL PM)  MG tablet    ferrous sulfate (FE TABS) 325 (65 Fe) MG EC tablet    furosemide (LASIX) 20 MG tablet    lisinopril (ZESTRIL) 20 MG tablet    metoprolol succinate ER (TOPROL XL) 25 MG 24 hr tablet    omega 3 1000 MG CAPS    pantoprazole (PROTONIX) 40 MG EC tablet    pioglitazone (ACTOS) 30 MG tablet    potassium chloride haja er (K-DUR)    sertraline (ZOLOFT) 100 MG tablet     No current facility-administered medications for this encounter.       VITALS: BP (!) 140/77 (BP Location: Left arm, Patient Position: Sitting)   Pulse 51   Temp 97.4  F (36.3  C) (Temporal)      PHYSICAL EXAM:  GENERAL: Patient is alert and oriented and in no acute distress  INTEGUMENTARY:   Wound (Active)   Wound Bed Black 01/26/24 1602   Drainage Amount None 01/26/24 1602   Dressing Foam;Per Plan of Care 01/26/24 1602   Dressing Status Changed 01/28/24 1200       Wound (used by OP [facility] only) 01/04/24 1228 Right dorsal 2 toe surgical (Active)   Thickness/Stage full thickness 02/22/24 1200   Base pink 02/22/24 1200   Periwound pink;swelling 02/22/24 1200   Periwound Temperature warm 02/22/24 1200   Periwound Skin Turgor soft 02/22/24 1200   Edges open 02/22/24 1200   Length (cm) 1.3 02/22/24 1200   Width (cm) 0.4 02/22/24 1200    Depth (cm) 0.2 02/22/24 1200   Wound (cm^2) 0.52 cm^2 02/22/24 1200   Wound Volume (cm^3) 0.1 cm^3 02/22/24 1200   Wound healing % 92.18 02/22/24 1200   Undermining [Depth (cm)/Location] 12 o'clock / 0.5cm 01/19/24 1107   Drainage Characteristics/Odor serosanguineous;creamy 02/22/24 1200   Drainage Amount moderate 02/22/24 1200   Care, Wound non-select wound debridement performed. 02/22/24 1200       Wound (used by OP [facility] only) 01/04/24 1229 Right lateral;lower leg ulceration, venous (Active)   Thickness/Stage full thickness 02/22/24 1200   Base red 02/22/24 1200   Periwound pink;swelling;intact 02/22/24 1200   Periwound Temperature warm 02/22/24 1200   Periwound Skin Turgor firm 02/22/24 1200   Edges open 02/22/24 1200   Length (cm) 2.9 02/22/24 1200   Width (cm) 1.9 02/22/24 1200   Depth (cm) 0.3 02/22/24 1200   Wound (cm^2) 5.51 cm^2 02/22/24 1200   Wound Volume (cm^3) 1.65 cm^3 02/22/24 1200   Wound healing % 31.13 02/22/24 1200   Tunneling [Depth (cm)/Location] . 02/05/24 1206   Undermining [Depth (cm)/Location] . 02/05/24 1206   Drainage Characteristics/Odor serosanguineous 02/22/24 1200   Drainage Amount moderate 02/22/24 1200   Care, Wound debrided 02/05/24 1206       Incision/Surgical Site 10/31/22 Left Shoulder (Active)       Incision/Surgical Site 01/26/24 Anterior;Right Toe (Comment  which one) (Active)   Incision Assessment UTV 01/28/24 0741   Closure MANNY 01/28/24 0741   Incision Drainage Amount None 01/28/24 0741   Dressing Intervention New dressing applied 01/28/24 1200                 PROCEDURES: 4% topical lidocaine was applied to the wound by the nursing staff. Patient was determined to be capable of making their own medical decisions and informed consent was obtained. Using a curette a selective debridement of non-viable tissue was performed of <20cm2. Hemostasis was achieved with pressure. The patient tolerated the procedure well.        PATIENT INSTRUCTIONS      Further instructions from your  "care team         Fer Myles      1960    A DME order for supplies has been placed to [facility], [address]. If there are any issues with your order including not receiving the order please call [facility] at [phone] option 1. They can also provide a tracking number for you if you had supplies shipped to you.      Dressing changes outside of clinic are being performed by [facility]     [facility] Phone: [phone]; Fax: [phone]      PLAN:  Continue surgical shoe for now. Continue weight bear mostly on heel, but okay to gradually increase walking a little. If wound worsens, back off.  Keep blood sugars below 150 and A1C below 7 (great job with this!)  Purchase stocking radha at medical supply store or online, such as a cage stocking radha.  Call PCP today about right calf swelling to make sure they don't recommend an ultrasound to rule out DVT. If you develop shortness of breath or chest pain, go to ED/call 301.  Continue to keep leg dry in shower with use of cast protector.     Wound Dressing Change: Right toe 2 (surgical)  - Wash your hands with soap and water before you begin your dressing change and prepare a clean surface for dressings.  - Cleanse with mild unscented soap (such as Cetaphil, Cerave or Dove) and water  - Apply betadine to incision  - Apply 1/12 Endoform Antimicrobial 2x2\"  - Apply  1/12th piece of 4x5 Hydrofera Blue Ready-Transfer to wound bed  - Secure with Medipore tape  - Wear surgical shoe (to offload pressure on toe 2). Try and walk on your heel.   Change three times per week with home care     Wound Dressing Change: right lateral lower leg:  - Cleanse with mild unscented soap (such as Cetaphil, Cerave or Dove) and water  - Apply thick, high-quality moisturizer to intact skin on feet and legs (such as CeraVe, Eucerin,Aquaphor or Vanicream)  - Apply ~1/8th piece of 4x5 Hydrofera Blue Ready-Transfer to wound bed  - Apply Spandage " size 7/MTSpandage size 4 from toes to knee  - On top of Spandage, apply 1/4 5x9 ABD pad   - Secure with roll gauze and medipore tape  - Apply 2-layer coflex wrap  Change 2 times per week with home care     Left leg skin care, daily in the morning:  - If needed, cleanse with mild unscented soap (such as Cetaphil, Cerave or Dove) and water  - Apply thick, high-quality moisturizer to intact skin (such as CeraVe, Eucerin, Aquaphor or Vanicream)  - Apply Spandigrip size F or compression stocking from home (remove at night)    Elevate: your legs above the level of your heart for 30 minutes: 2-3 times each day   Ways to do this:  - Lay on the couch or your bed and prop your legs up on pillows  - Recline back as far as you can go in your recliner and prop your legs on pillows.    Low sodium, high protein diet:  A diet high in protein is important for wound healing, we recommend getting 90 grams of protein per day. Taking protein shakes or bars are a good way to get extra protein in your diet.    Compression:   Left leg compression stocking:  can be removed at night and put back on first thing in the morning.   Right leg 2-layer wrap:  leave on until home care nurse visits.  Please remove compression dressing if toes turn blue and/or tingle and can not be relieved by raising the leg for one hour.     It is very important to follow your healthcare providers instructions. Studies indicate when compression therapy is applied as directed, healing may occur faster and more completely. Do Not remove CoFlex unless your healthcare provider tells you to remove it.  Helpful Tips:  -Your bandage may feel tight at first. This is normal. When the swelling goes down, it will not feel as tight.  -Wear the stocking that comes with the system over the bandage to help you put on shoes and clothing  -Wear comfortable shoes and walk regularly. Walking will improve your circulation which will improve healing.  -Keep your bandage dry.   -CoFlex  maybe left on your leg for up to 7 days. After that your doctor or nurse will apply a new CoFlex.   -Call your doctor or nurse if the bandage gets wet, slips down or if you have pain, tingling, numbness or discoloration.       Main Provider: Rosalina Estrada NP February 22, 2024    Call us at [phone] if you have any questions about your wounds, have redness or swelling around your wound, have a fever of 101 degrees Fahrenheit or greater or if you have any other problems or concerns. We answer the phone Monday through Friday 8 am to 4 pm, please leave a message as we check the voicemail frequently throughout the day.     If you had a positive experience please indicate that on your patient satisfaction survey form that [facility] will be sending you.    It was a pleasure meeting with you today.  Thank you for allowing me and my team the privilege of caring for you today.  YOU are the reason we are here, and I truly hope we provided you with the excellent service you deserve.  Please let us know if there is anything else we can do for you so that we can be sure you are leaving completely satisfied with your care experience.      If you have any billing related questions please call the [facility] Business office at [phone]. The clinic staff does not handle billing related matters.    If you are scheduled to have a follow up appointment, you will receive a reminder call the day before your visit. On the appointment day please arrive 15 minutes prior to your appointment time. If you are unable to keep that appointment, please call the clinic to cancel or reschedule. If you are more than 10 minutes late or greater for your scheduled appointment time, the clinic policy is that you may be asked to reschedule.         Electronically signed by Rosalina Estrada CNP on February 22, 2024

## 2024-02-27 NOTE — ADDENDUM NOTE
Encounter addended by: Aidee Acosta RN on: 2/27/2024 8:53 AM   Actions taken: Charge Capture section accepted

## 2024-03-22 NOTE — PROGRESS NOTES
[facility]    ASSESSMENT:    Chronic ulcer of right leg with fat layer exposed (H)-hypergranular  Continued lower extremity edema, R worse than L    PLAN/DISCUSSION:   R calf continues to be edematous, not wearing compression. He is agreeable to wearing compression and understands the importance  Right calf wound is hypergranular, size has decreased. Discussed how edema can impede wound healing  Wound care plan: Hydrofera Blue and Mepilex to the right leg, spandage. Dressing will be performed by home care. See bottom of note for detailed wound care and patient instructions  Dietary recommendations discussed, see AVS       HISTORY OF PRESENT ILLNESS:   Fer Myles is a 63 year old male with a history of of a right foot hallux amputation on 10/21/2023 for osteomyelitis, hx of diabetes, hypertension, chronic lower extremity edema who presents today with numerous wounds, including previous surgical incision of the right hallux that dehisced. He also has a wound that started mid-December 2023 on his right second toe in addition to his right lateral calf. The right second toe wound continues to drain heavily. Denies fevers, chills, infection. He has not been wearing surgical shoe, no compression for the last few months due to generalized weakness.      Hx of diabetes with bilateral lower extremity neuropathy.   Patient Active Problem List   Diagnosis    paroxysmal atrial fib    Alcohol abuse    Hypertension    Morbid obesity (H)    Melena    Gastrointestinal hemorrhage, unspecified gastrointestinal hemorrhage type    Osteomyelitis of great toe of right foot (H)    Acute posthemorrhagic anemia    Chronic toe ulcer, right, with fat layer exposed (H)    Chronic ulcer of right leg with fat layer exposed (H)    Chronic toe ulcer, left, with fat layer exposed (H)    Osteomyelitis of right foot, unspecified type (H)    Sepsis, due to unspecified organism, unspecified whether acute organ dysfunction  present (H)           MEDICATIONS:   Current Outpatient Medications   Medication    apixaban ANTICOAGULANT (ELIQUIS) 5 MG tablet    allopurinol (ZYLOPRIM) 100 MG tablet    balsalazide (COLAZAL) 750 MG capsule    Calcium Carbonate Antacid (MATHEUS-SELTZER HEARTBURN PO)    diphenhydrAMINE-acetaminophen (TYLENOL PM)  MG tablet    ferrous sulfate (FE TABS) 325 (65 Fe) MG EC tablet    furosemide (LASIX) 20 MG tablet    lisinopril (ZESTRIL) 20 MG tablet    metoprolol succinate ER (TOPROL XL) 25 MG 24 hr tablet    omega 3 1000 MG CAPS    pantoprazole (PROTONIX) 40 MG EC tablet    pioglitazone (ACTOS) 30 MG tablet    potassium chloride haja er (K-DUR)    sertraline (ZOLOFT) 100 MG tablet     No current facility-administered medications for this encounter.       VITALS: /88   Pulse (!) 125   Temp 96.8  F (36  C) (Temporal)      PHYSICAL EXAM:  GENERAL: Patient is alert and oriented and in no acute distress  INTEGUMENTARY:   Wound (Active)       Wound (used by OP [facility] only) 01/04/24 1229 Right lateral;lower leg ulceration, venous (Active)   Thickness/Stage full thickness 03/22/24 1225   Base granulating;hypergranulation 03/22/24 1225   Periwound edematous 03/22/24 1225   Periwound Temperature warm 03/22/24 1225   Periwound Skin Turgor firm 03/22/24 1225   Edges open 03/22/24 1225   Length (cm) 2.1 03/22/24 1225   Width (cm) 1.2 03/22/24 1225   Depth (cm) 0.1 03/22/24 1225   Wound (cm^2) 2.52 cm^2 03/22/24 1225   Wound Volume (cm^3) 0.25 cm^3 03/22/24 1225   Wound healing % 68.5 03/22/24 1225   Tunneling [Depth (cm)/Location] . 02/05/24 1206   Undermining [Depth (cm)/Location] . 02/05/24 1206   Drainage Characteristics/Odor serosanguineous 03/22/24 1225   Drainage Amount moderate 03/22/24 1225   Care, Wound chemical cautery applied;non-select wound debridement performed. 03/22/24 1225       Incision/Surgical Site 10/31/22 Left Shoulder (Active)       Incision/Surgical Site 01/26/24 Anterior;Right Toe (Comment  which  one) (Active)           PROCEDURES: Silver nitrate was applied to the wound.      PATIENT INSTRUCTIONS      Further instructions from your care team         Matias Mckennachaparro   1960  A DME order was not completed because the supplies are ordered by home care or at a care facility  [facility] Phone: [phone]; Fax: [phone]      PLAN:  Keep blood sugars below 150 and A1C below 7 (great job with this!)  Purchase stocking radha at medical supply store or online, such as a cage stocking radha.  Ok to shower with dressing off prior to Home care Visit   Use of cast protector alternate times to shower     Wound Dressing Change: Right toe 2 (surgical)- healed    Wound Dressing Change: Right lateral lower leg:  - Cleanse with mild unscented soap (such as Cetaphil, Cerave or Dove) and water  - Apply VASHE on gauze, to wound bed, for 10 minutes, remove gauze (do not rinse)   - Apply thick, high-quality moisturizer to intact skin on feet   - Apply ~1/10th piece of 4x5 Hydrofera Blue Ready-Transfer to wound bed  Cover with Silicone Foam pad with border  Pull up Spandagrip F or Compression sock from toes to knee  Change 2 times per week     Left leg skin care, daily in the morning:  - If needed, cleanse with mild unscented soap (such as Cetaphil, Cerave or Dove) and water  - Apply moisturizer to intact skin (such as CeraVe, Eucerin, Aquaphor or Vanicream)  - Apply Spandigrip size F or compression stocking from home (remove at night)     Elevate: your legs above the level of your heart for 30 minutes: 2 times each day   - Lay on the couch or your bed and prop your legs up on pillows  - Recline back as far as you can go in your recliner and prop your legs on pillows.     Low sodium, high protein diet: protein shakes or bars      Compression: BLE Spandagrip F or Compression sock  Please remove compression dressing if toes turn blue and/or tingle and can not be relieved by raising the leg for one hour.      Main  Provider: Rosalina Estrada NP March 22, 2024    Call us at [phone] if you have any questions about your wounds, if you have redness or swelling around your wound, have a fever of 101 degrees Fahrenheit or greater or if you have any other problems or concerns. We answer the phone Monday through Friday 8 am to 4 pm, please leave a message as we check the voicemail frequently throughout the day. If you have a concern over the weekend, please leave a message and we will return your call Monday. If the need is urgent, go to the ER or urgent care.    If you had a positive experience please indicate that on your patient satisfaction survey form that [facility] will be sending you.  It was a pleasure meeting with you today.  Thank you for allowing me and my team the privilege of caring for you today.  YOU are the reason we are here, and I truly hope we provided you with the excellent service you deserve.  Please let us know if there is anything else we can do for you so that we can be sure you are leaving completely satisfied with your care experience.      If you have any billing related questions please call the [facility] Business office at [phone]. The clinic staff does not handle billing related matters.  If you are scheduled to have a follow up appointment, you will receive a reminder call the day before your visit. On the appointment day please arrive 15 minutes prior to your appointment time. If you are unable to keep that appointment, please call the clinic to cancel or reschedule. If you are more than 10 minutes late or greater for your scheduled appointment time, the clinic policy is that you may be asked to reschedule.          Electronically signed by Rosalina Estrada CNP on March 22, 2024

## 2024-03-22 NOTE — DISCHARGE INSTRUCTIONS
Matias Myles   1960  A DME order was not completed because the supplies are ordered by home care or at a care facility  [facility] Phone: [phone]; Fax: [phone]      PLAN:  Keep blood sugars below 150 and A1C below 7 (great job with this!)  Purchase stocking radha at medical supply store or online, such as a cage stocking radha.  Ok to shower with dressing off prior to Home care Visit   Use of cast protector alternate times to shower     Wound Dressing Change: Right toe 2 (surgical)- healed    Wound Dressing Change: Right lateral lower leg:  - Cleanse with mild unscented soap (such as Cetaphil, Cerave or Dove) and water  - Apply VASHE on gauze, to wound bed, for 10 minutes, remove gauze (do not rinse)   - Apply thick, high-quality moisturizer to intact skin on feet   - Apply ~1/10th piece of 4x5 Hydrofera Blue Ready-Transfer to wound bed  Cover with Silicone Foam pad with border  Pull up Spandagrip F or Compression sock from toes to knee  Change 2 times per week     Left leg skin care, daily in the morning:  - If needed, cleanse with mild unscented soap (such as Cetaphil, Cerave or Dove) and water  - Apply moisturizer to intact skin (such as CeraVe, Eucerin, Aquaphor or Vanicream)  - Apply Spandigrip size F or compression stocking from home (remove at night)     Elevate: your legs above the level of your heart for 30 minutes: 2 times each day   - Lay on the couch or your bed and prop your legs up on pillows  - Recline back as far as you can go in your recliner and prop your legs on pillows.     Low sodium, high protein diet: protein shakes or bars      Compression: BLE Spandagrip F or Compression sock  Please remove compression dressing if toes turn blue and/or tingle and can not be relieved by raising the leg for one hour.      Main Provider: Rosalina Estrada NP March 22, 2024    Call us at [phone] if you have any questions about your wounds, if you have redness or swelling around your  wound, have a fever of 101 degrees Fahrenheit or greater or if you have any other problems or concerns. We answer the phone Monday through Friday 8 am to 4 pm, please leave a message as we check the voicemail frequently throughout the day. If you have a concern over the weekend, please leave a message and we will return your call Monday. If the need is urgent, go to the ER or urgent care.    If you had a positive experience please indicate that on your patient satisfaction survey form that [facility] will be sending you.  It was a pleasure meeting with you today.  Thank you for allowing me and my team the privilege of caring for you today.  YOU are the reason we are here, and I truly hope we provided you with the excellent service you deserve.  Please let us know if there is anything else we can do for you so that we can be sure you are leaving completely satisfied with your care experience.      If you have any billing related questions please call the [facility] Business office at [phone]. The clinic staff does not handle billing related matters.  If you are scheduled to have a follow up appointment, you will receive a reminder call the day before your visit. On the appointment day please arrive 15 minutes prior to your appointment time. If you are unable to keep that appointment, please call the clinic to cancel or reschedule. If you are more than 10 minutes late or greater for your scheduled appointment time, the clinic policy is that you may be asked to reschedule.

## 2024-04-18 NOTE — DISCHARGE INSTRUCTIONS
04/18/2024   Matias Myles   1960    A DME order was not completed because the supplies are ordered by home care: Please order more Mepilex/foam dressings    [facility] Phone: [phone]; Fax: [phone]      PLAN:  Left toe 2: may benefit from tenotomy (in-office procedure to straighten toe): If wound worsens, follow up with Dr. Diallo in [place]: [phone]  Keep blood sugars below 150 and A1C below 7 (great job with this!)  Purchase stocking radha at [id number] or online, such as a cage stocking radha.  Ok to shower with dressing off prior to home care visit; Use of cast protector alternate times to shower     Right lateral lower leg:  - Cleanse with mild unscented soap (such as Cetaphil, Cerave or Dove) and water  - Apply VASHE on gauze, to wound bed, for 10 minutes, remove gauze (do not rinse)   - Apply thick, high-quality moisturizer to intact skin on feet   - Apply Fibracol (or any collagen), cut-to-fit size of wound  - Cover with Mepilex foam border 4x4 (or similar)  - Pull up compression stocking from home (remove at night)  Change 2 times per week     Wound Dressing Change: Left toe 2:  - Cleanse with mild unscented soap (such as Cetaphil, Cerave or Dove) and water  - Apply small piece of Mepilex 4x4 nonadhesive foam  - Secure with gentle tape  Change 2 times per week    Left leg skin care, daily in the morning:  - If needed, cleanse with mild unscented soap (such as Cetaphil, Cerave or Dove) and water  - Apply moisturizer to intact skin (such as CeraVe, Eucerin, Aquaphor or Vanicream)  - Apply compression stocking from home (remove at night)     Elevate: your legs above the level of your heart for 30 minutes: 2 times each day   - Lay on the couch or your bed and prop your legs up on pillows  - Recline back as far as you can go in your recliner and prop your legs on pillows.     Low sodium, high protein diet: protein shakes or bars        Main Provider: Rosalina Estrada,  NP April 18, 2024    Call us at [phone] if you have any questions about your wounds, if you have redness or swelling around your wound, have a fever of 101 degrees Fahrenheit or greater or if you have any other problems or concerns. We answer the phone Monday through Friday 8 am to 4 pm, please leave a message as we check the voicemail frequently throughout the day. If you have a concern over the weekend, please leave a message and we will return your call Monday. If the need is urgent, go to the ER or urgent care.    If you had a positive experience please indicate that on your patient satisfaction survey form that [facility] will be sending you.    It was a pleasure meeting with you today.  Thank you for allowing me and my team the privilege of caring for you today.  YOU are the reason we are here, and I truly hope we provided you with the excellent service you deserve.  Please let us know if there is anything else we can do for you so that we can be sure you are leaving completely satisfied with your care experience.      If you have any billing related questions please call the [facility] Business office at [phone]. The clinic staff does not handle billing related matters.    If you are scheduled to have a follow up appointment, you will receive a reminder call the day before your visit. On the appointment day please arrive 15 minutes prior to your appointment time. If you are unable to keep that appointment, please call the clinic to cancel or reschedule. If you are more than 10 minutes late or greater for your scheduled appointment time, the clinic policy is that you may be asked to reschedule.

## 2024-04-18 NOTE — PROGRESS NOTES
[facility]    ASSESSMENT:   Chronic toe ulcer, right, with fat layer exposed (H)-granular, improved in size  Continued lower extremity edema, R worse than L     PLAN/DISCUSSION:   R calf continues to be edematous, not wearing compression. He is agreeable to wearing compression and understands the importance   Right calf wound is granular, smaller in size.   Has wound on second digit on the left foot, discussed at last appointment with Dr. Diallo who recommended a tenotomy, he continues to want to monitor the wound  Wound care plan: Fibracol and Mepilex to the right leg. Dressing will be performed by home care agency. See bottom of note for detailed wound care and patient instructions    HISTORY OF PRESENT ILLNESS:   Fer Myles is a 63 year old male with a history of of a right foot hallux amputation on 10/21/2023 for osteomyelitis, hx of diabetes, hypertension, chronic lower extremity edema who presents today with numerous wounds, including previous surgical incision of the right hallux that dehisced. He also has a wound that started mid-December 2023 on his right second toe in addition to his right lateral calf. The right second toe wound continues to drain heavily. Denies fevers, chills, infection. He has not been wearing surgical shoe, no compression for the last few months due to generalized weakness.      Hx of diabetes with bilateral lower extremity neuropathy.   Patient Active Problem List   Diagnosis    paroxysmal atrial fib    Alcohol abuse    Hypertension    Morbid obesity (H)    Melena    Gastrointestinal hemorrhage, unspecified gastrointestinal hemorrhage type    Osteomyelitis of great toe of right foot (H)    Acute posthemorrhagic anemia    Chronic toe ulcer, right, with fat layer exposed (H)    Chronic ulcer of right leg with fat layer exposed (H)    Chronic toe ulcer, left, with fat layer exposed (H)    Osteomyelitis of right foot, unspecified type (H)    Sepsis, due to  unspecified organism, unspecified whether acute organ dysfunction present (H)     Current Outpatient Medications   Medication Sig Dispense Refill    allopurinol (ZYLOPRIM) 100 MG tablet Take 200 mg by mouth daily      apixaban ANTICOAGULANT (ELIQUIS) 5 MG tablet Take 1 tablet (5 mg) by mouth 2 times daily 180 tablet 0    balsalazide (COLAZAL) 750 MG capsule Take 4,500 mg by mouth daily      Calcium Carbonate Antacid (MATHEUS-SELTZER HEARTBURN PO) Take by mouth as needed      diphenhydrAMINE-acetaminophen (TYLENOL PM)  MG tablet Take 3 tablets by mouth at bedtime      ferrous sulfate (FE TABS) 325 (65 Fe) MG EC tablet Take 325 mg by mouth daily (Patient not taking: Reported on 2/14/2024)      furosemide (LASIX) 20 MG tablet Take 20 mg by mouth daily      lisinopril (ZESTRIL) 20 MG tablet Take 20 mg by mouth daily      metoprolol succinate ER (TOPROL XL) 25 MG 24 hr tablet Take 25 mg by mouth daily      omega 3 1000 MG CAPS Take 1,000 mg by mouth daily      pantoprazole (PROTONIX) 40 MG EC tablet Take 40 mg by mouth daily      pioglitazone (ACTOS) 30 MG tablet Take 30 mg by mouth daily      potassium chloride haja er (K-DUR) Take 10 mEq by mouth daily      sertraline (ZOLOFT) 100 MG tablet Take 150 mg by mouth daily       No current facility-administered medications for this encounter.       VITALS: BP (!) 145/80 (BP Location: Left arm, Patient Position: Sitting)   Pulse 58   Temp 97.1  F (36.2  C) (Temporal)      PHYSICAL EXAM:  GENERAL: Patient is alert and oriented and in no acute distress  INTEGUMENTARY:   Wound (Active)       Wound (used by OP [facility] only) 01/04/24 1229 Right lateral;lower leg ulceration, venous (Active)   Thickness/Stage full thickness 04/18/24 1100   Base granulating;hypergranulation 04/18/24 1100   Periwound edematous;macerated 04/18/24 1100   Periwound Temperature warm 04/18/24 1100   Periwound Skin Turgor firm 04/18/24 1100   Edges open 04/18/24 1100   Length (cm) 1.8 04/18/24 1100   Width  (cm) 0.9 04/18/24 1100   Depth (cm) 0.2 04/18/24 1100   Wound (cm^2) 1.62 cm^2 04/18/24 1100   Wound Volume (cm^3) 0.32 cm^3 04/18/24 1100   Wound healing % 79.75 04/18/24 1100   Tunneling [Depth (cm)/Location] . 02/05/24 1206   Undermining [Depth (cm)/Location] . 02/05/24 1206   Drainage Characteristics/Odor serosanguineous 04/18/24 1100   Drainage Amount moderate 04/18/24 1100   Care, Wound non-select wound debridement performed. 04/18/24 1100       Wound (used by OP [facility] only) 04/18/24 1111 Left dorsal 2 toe neuropathic ulcer (Active)   Thickness/Stage full thickness 04/18/24 1100   Base pink;yellow 04/18/24 1100   Periwound pink;swelling 04/18/24 1100   Periwound Temperature warm 04/18/24 1100   Periwound Skin Turgor firm 04/18/24 1100   Edges open 04/18/24 1100   Length (cm) 0.4 04/18/24 1100   Width (cm) 0.8 04/18/24 1100   Depth (cm) 0.1 04/18/24 1100   Wound (cm^2) 0.32 cm^2 04/18/24 1100   Wound Volume (cm^3) 0.03 cm^3 04/18/24 1100   Drainage Characteristics/Odor serous 04/18/24 1100   Drainage Amount scant 04/18/24 1100   Care, Wound non-select wound debridement performed. 04/18/24 1100       Incision/Surgical Site 10/31/22 Left Shoulder (Active)       Incision/Surgical Site 01/26/24 Anterior;Right Toe (Comment  which one) (Active)                 PROCEDURES: Mechanical debridement was performed with cleansing of the wound by the nursing staff      PATIENT INSTRUCTIONS      Further instructions from your care team         04/18/2024   Matias Myles   1960    A DME order was not completed because the supplies are ordered by home care: Please order more Mepilex/foam dressings    [facility] Phone: [phone]; Fax: [phone]      PLAN:  Left toe 2: may benefit from tenotomy (in-office procedure to straighten toe): If wound worsens, follow up with Dr. Diallo in [place]: [phone]  Keep blood sugars below 150 and A1C below 7 (great job with this!)  Purchase stocking radha at [facility] [id number] or online, such as a cage stocking radha.  Ok to shower with dressing off prior to home care visit; Use of cast protector alternate times to shower     Right lateral lower leg:  - Cleanse with mild unscented soap (such as Cetaphil, Cerave or Dove) and water  - Apply VASHE on gauze, to wound bed, for 10 minutes, remove gauze (do not rinse)   - Apply thick, high-quality moisturizer to intact skin on feet   - Apply Fibracol (or any collagen), cut-to-fit size of wound  - Cover with Mepilex foam border 4x4 (or similar)  - Pull up compression stocking from home (remove at night)  Change 2 times per week     Wound Dressing Change: Left toe 2:  - Cleanse with mild unscented soap (such as Cetaphil, Cerave or Dove) and water  - Apply small piece of Mepilex 4x4 nonadhesive foam  - Secure with gentle tape  Change 2 times per week    Left leg skin care, daily in the morning:  - If needed, cleanse with mild unscented soap (such as Cetaphil, Cerave or Dove) and water  - Apply moisturizer to intact skin (such as CeraVe, Eucerin, Aquaphor or Vanicream)  - Apply compression stocking from home (remove at night)     Elevate: your legs above the level of your heart for 30 minutes: 2 times each day   - Lay on the couch or your bed and prop your legs up on pillows  - Recline back as far as you can go in your recliner and prop your legs on pillows.     Low sodium, high protein diet: protein shakes or bars        Main Provider: Rosalina Estrada NP April 18, 2024    Call us at [phone] if you have any questions about your wounds, if you have redness or swelling around your wound, have a fever of 101 degrees Fahrenheit or greater or if you have any other problems or concerns. We answer the phone Monday through Friday 8 am to 4 pm, please leave a message as we check the voicemail frequently throughout the day. If you have a concern over the weekend, please leave a message and we will return your call Monday. If the need is  urgent, go to the ER or urgent care.    If you had a positive experience please indicate that on your patient satisfaction survey form that [facility] will be sending you.    It was a pleasure meeting with you today.  Thank you for allowing me and my team the privilege of caring for you today.  YOU are the reason we are here, and I truly hope we provided you with the excellent service you deserve.  Please let us know if there is anything else we can do for you so that we can be sure you are leaving completely satisfied with your care experience.      If you have any billing related questions please call the [facility] Business office at [phone]. The clinic staff does not handle billing related matters.    If you are scheduled to have a follow up appointment, you will receive a reminder call the day before your visit. On the appointment day please arrive 15 minutes prior to your appointment time. If you are unable to keep that appointment, please call the clinic to cancel or reschedule. If you are more than 10 minutes late or greater for your scheduled appointment time, the clinic policy is that you may be asked to reschedule.         Electronically signed by Rosalina Estrada CNP on April 18, 2024

## 2024-05-02 PROBLEM — D50.0 IRON DEFICIENCY ANEMIA SECONDARY TO BLOOD LOSS (CHRONIC): Status: ACTIVE | Noted: 2024-01-01

## 2024-05-10 NOTE — ED NOTES
Bed: RHE-A  Expected date:   Expected time:   Means of arrival:   Comments:  [facility] to ED 2 when clean

## 2024-05-10 NOTE — ED TRIAGE NOTES
Pt presents via EMS for a fall at home. Pt stated he was moving his  in the garage when he tripped and fell - during the fall his arm got hung up on the ladder. His neighbor helped with getting EMS contacted. EMS gave him 50 of fent; BG was 123, 18G IV in the L AC. Pt is on eliquis and has a wound RN who stops by to care for his R leg wound. Pt stated his Hg was 8 last week and has a plan for an iron infusion next week. R shoulder/arm is in a position of comfort.      Triage Assessment (Adult)       Row Name 05/10/24 1217          Triage Assessment    Airway WDL WDL        Respiratory WDL    Respiratory WDL WDL        Skin Circulation/Temperature WDL    Skin Circulation/Temperature WDL WDL        Peripheral/Neurovascular WDL    Peripheral Neurovascular WDL WDL

## 2024-05-10 NOTE — ED PROVIDER NOTES
Emergency Department Note      History of Present Illness     Chief Complaint  Fall and Shoulder Injury    HPI  Fer Myles is a 63 year old male presenting to the ER via EMS for evaluation of a fall and right shoulder injury.  Patient reports he was moving items in his garage when his right arm became inadvertently tangled in the rungs of his ladder.  He subsequently fell, jerking his right arm.  He noted immediate onset of pain to the right shoulder.  He denies injuries or pain to any other location including remainder of arm, nor chest.  No head injury nor loss of consciousness.  Patient is on anticoagulation.  He has not had anything to eat or drink in anticipation of previously scheduled screening labs through his primary clinic.  EMS provided 50 mcg of fentanyl prior to arrival.  Patient reports a prior history of left shoulder dislocation though has not previously had a shoulder dislocation to the right side.  He denies any other complaints or concerns at this time.  Denies numbness or tingling distally to his shoulder.    Independent Historian  None    Review of External Notes  Hospital discharge summary reviewed from 1/28/2024, where patient was admitted for right forefoot cellulitis as well as osteomyelitis of the right second toe.  He also has a history of paroxysmal atrial fibrillation for which he takes Eliquis.    Reviewed Operative note from 10/31/2022 where patient had a left anteroinferior shoulder dislocation, which was unable to be reduced in the ED and required paralytic in OR for reduction.      Past Medical History   Medical History and Problem List  Past Medical History:   Diagnosis Date     Alcohol abuse      Cataract      Depression      Gastroesophageal reflux disease with esophagitis      Gout      H/O gastric bypass 1991     Hypertension      HAKEEM (obstructive sleep apnea)      paroxysmal atrial fib      Subdural hematoma (H) 2007     type II diabetes      Ulcerative colitis (H)         Medications  allopurinol (ZYLOPRIM) 100 MG tablet  apixaban ANTICOAGULANT (ELIQUIS) 5 MG tablet  balsalazide (COLAZAL) 750 MG capsule  Calcium Carbonate Antacid (MATHEUS-SELTZER HEARTBURN PO)  diphenhydrAMINE-acetaminophen (TYLENOL PM)  MG tablet  furosemide (LASIX) 20 MG tablet  lisinopril (ZESTRIL) 20 MG tablet  metoprolol succinate ER (TOPROL XL) 25 MG 24 hr tablet  pantoprazole (PROTONIX) 40 MG EC tablet  pioglitazone (ACTOS) 30 MG tablet  sertraline (ZOLOFT) 100 MG tablet        Surgical History   Past Surgical History:   Procedure Laterality Date     AMPUTATE TOE(S) Right 10/21/2023    Procedure: Right foot hallux amputation;  Surgeon: Petros Max DPM;  Location:  OR     AMPUTATE TOE(S) Right 1/26/2024    Procedure: Right foot second digit amputation;  Surgeon: Jolanta Diallo DPM;  Location: [facility] OR     CLOSED REDUCTION SHOULDER Left 10/31/2022    Procedure: Closed reduction left shoulder dislocation;  Surgeon: Heath Romo MD;  Location:  OR     ESOPHAGOSCOPY, GASTROSCOPY, DUODENOSCOPY (EGD), COMBINED N/A 2/20/2022    Procedure: ESOPHAGOGASTRODUODENOSCOPY (EGD);  Surgeon: Rocco Llamas MD;  Location:  GI     GI SURGERY  2005    gastric bypass     HEAD & NECK SURGERY  2008    subdural hematoma     Physical Exam   Patient Vitals for the past 24 hrs:   BP Temp Temp src Pulse Resp SpO2 Height Weight   05/10/24 1536 (!) 151/81 -- -- 63 17 98 % -- --   05/10/24 1505 (!) 157/80 -- -- 64 10 98 % -- --   05/10/24 1450 (!) 146/79 -- -- 65 14 100 % -- --   05/10/24 1430 -- -- -- 65 19 98 % -- --   05/10/24 1430 (!) 142/77 -- -- -- -- -- -- --   05/10/24 1415 (!) 142/75 -- -- 65 -- -- -- --   05/10/24 1415 (!) 142/75 -- -- 64 22 100 % -- --   05/10/24 1410 125/70 -- -- 67 21 100 % -- --   05/10/24 1400 -- -- -- 67 12 93 % -- --   05/10/24 1357 -- -- -- -- 21 -- -- --   05/10/24 1353 (!) 143/87 -- -- 68 22 100 % -- --   05/10/24 1345 -- -- -- 66 11 100 % -- --   05/10/24  "1343 (!) 156/81 -- -- 65 (!) 9 100 % -- --   05/10/24 1330 -- -- -- 68 (!) 8 100 % -- --   05/10/24 1328 (!) 156/85 -- -- 67 18 100 % -- --   05/10/24 1321 (!) 151/84 97.5  F (36.4  C) Oral 66 18 100 % -- --   05/10/24 1315 (!) 151/84 -- -- 66 -- 99 % -- --   05/10/24 1313 (!) 152/88 -- -- 67 -- 100 % -- --   05/10/24 1238 -- -- -- -- -- -- 1.803 m (5' 11\") 107.5 kg (237 lb)   05/10/24 1209 (!) 150/86 97.5  F (36.4  C) Oral 67 18 99 % -- --     Physical Exam  General:   Well-nourished   Speaking in full sentences  Eyes:   Conjunctiva without injection or scleral icterus  ENT:   Moist mucous membranes   Nares patent   Pinnae normal  Neck:   Full ROM   No stiffness appreciated  Resp:   Lungs CTAB   No crackles, wheezing or audible rubs   Good air movement  CV:    Normal rate, regular rhythm   S1 and S2 present   No murmur, gallop or rub  GI:   BS present   Abdomen soft without distention   Non-tender to light and deep palpation   No guarding or rebound tenderness  Skin:   Warm, dry, well perfused   No rashes or open wounds on exposed skin  MSK:   Right upper extremity held in external rotation and abducted (position of comfort)   Step-off lateral to acromion process   No tenderness to palpation to distal humerus, elbow, forearm, wrist nor hand   2+ radial pulse   Capillary refill <3 sec   Remainder of extremities without obvious deformity.  Neuro:   Alert   Intact sensation to light touch over medial, radial, ulnar and axillary nerve distributions   Able to wiggle fingers   Answers questions appropriately   Moves all extremities equally   Gait stable  Psych:   Normal affect, normal mood    Diagnostics   Lab Results   Labs Ordered and Resulted from Time of ED Arrival to Time of ED Departure   HEMOGLOBIN - Abnormal       Result Value    Hemoglobin 9.0 (*)        Imaging  XR Shoulder Right G/E 3 Views   Final Result   IMPRESSION: Anterior dislocation of the humeral head relative to the   glenoid. Fracture fragments at " the posterior glenoid which may   originate from the posterior humeral head and/or the inferior glenoid   defect. Moderate acromioclavicular joint degenerative changes.      DONITA YU MD            SYSTEM ID:  [id number]        Independent Interpretation  XR reviewed and shoulder dislocation is noted with associated fracture fragment  ED Course    Medications Administered  Medications   flumazenil (ROMAZICON) injection 0.2 mg (has no administration in time range)   propofol (DIPRIVAN) injection 10 mg/mL vial (30 mg Intravenous $Given 5/10/24 1403)   HYDROmorphone (PF) (DILAUDID) injection 0.5 mg (0.5 mg Intravenous $Given 5/10/24 1233)   propofol (DIPRIVAN) injection 10 mg/mL vial (100 mg Intravenous $Given 5/10/24 1353)   HYDROmorphone (PF) (DILAUDID) injection 0.5 mg (0.5 mg Intravenous $Given 5/10/24 1435)   fentaNYL (PF) (SUBLIMAZE) injection 50 mcg (50 mcg Intravenous $Given 5/10/24 1559)       Procedures  Procedures     Sedation     Procedure: Procedural Sedation    Sedation Level: Deep    Indication: Joint reduction    Consent: Written from Patient     Universal protocol: Universal protocol was followed and time out conducted just prior to starting procedure, confirming patient identity, site/side, procedure, patient position, and availability of correct equipment and implants.     Last PO Intake: Emergent procedure    ASA Class: Class 2 - A patient with mild systemic disease     Exam:  Mallampati:  Grade 2 - Soft palate and major part of uvula visible   Lungs: Clear   Heart: Regular rate and rhythm     Medication: Propofol  Dose: 220 mg     Monitoring:  Monitoring consisted of heart rate, cardiac, continuous pulse oximetry, frequent blood pressure checks.   There was constant attendance by RN until patient recovered and constant attendance by physician until patient stable.   Intubation and emergency airway equipment available.     Response: Vital signs stable, oxygen saturations greater than 92%        Patient Status: Post procedure patient was alert.     Total Physician Drug Administration / Monitoring Time: 13 minutes.     Patient was monitored during recovery and returned to pre-procedure baseline.       Dislocation Reduction   Procedure: Dislocation Reduction  Consent: Written from Patient  Risks Discussed: Pain, need for repeat attempts, fracture, neurovascular injury, unsuccessful attempts, and need to go to OR  Universal Protocol: Universal protocol was followed and time out conducted just prior to starting procedure, confirming patient identity, site/side, procedure, patient position, and availability of correct equipment and implants.    Indication: Dislocated Shoulder   Location: Right Shoulder  Anesthesia/Sedation: See above  Procedure Detail: I manipulated the joint including longitudinal traction, adduction, external rotation, scapular manipulation, as well as traction/countertraction.  Shoulder was unable to be reduced.  Post procedure assessment:  Neurovascular status intact.    Patient Status: The patient tolerated the procedure well: Yes. There were no complications.      Discussion of Management  Orthopedics,      Social Determinants of Health adding to complexity of care  None    ED Course  ED Course as of 05/10/24 1605   Fri May 10, 2024   1321 Patient re-evalluated   1409 Procedural sedation   1415 Spoke with Anaid medina [facility].  She will review with staff.   1424 Patient re-evaluated   1537 Spoke again with [facility], awaiting to hear back from staff.     Medical Decision Making / Diagnosis   CMS Diagnoses: None    MIPS     None    MDM  Fer Myles is a 63 year old male presenting to the ER via EMS for evaluation of a fall and right shoulder injury.  VS on presentation reveal elevated BP though otherwise are unremarkable.  Skeletal survey notable for trauma and deformity to right shoulder.  Patient neurovascularly intact on initial and repeat  evaluation.  X-ray confirms anterior dislocation of the humeral head relative to the glenoid with associated fracture fragments of the posterior glenoid, possibly from the glenoid or posterior humeral head.  After informed written and verbal consent obtained, the patient underwent procedural sedation.  Unfortunately, despite multiple techniques, including assistance with my colleague, patient's shoulder dislocation was unable to be successfully reduced.  Further attempts aborted.  Patient allowed to awaken from sedation.  He remains neurovascularly intact.  I consulted orthopedics, who plans to review further with staff to determine next steps.  Patient remains with ongoing pain, and provided additional analgesia during his ED course under my care.  I did obtain a screening hemoglobin as he has been followed as an outpatient for iron deficiency.  Hemoglobin today returned at 9.0, which is improved compared with values reported by patient last week.  Patient was signout to my colleague of the evening shift pending orthopedics consultation and ultimate disposition.    Disposition  Signed out to evening team pending orthopedics review and ultimate disposition determination    ICD-10 Codes:    ICD-10-CM    1. Shoulder dislocation, right, initial encounter  S43.004A            Rupesh Mcarthur MD    [employer]        Rupesh Mcarthur MD  05/10/24 1606       Rupesh Mcarthur MD  05/10/24 1703

## 2024-05-10 NOTE — PROGRESS NOTES
An ETCO2 monitor was placed on the pt with 15LPM bled in. The Ambu bag, suction, and airways were setup and present in the room, but not needed  Pt was able to maintain airway throughout the procedure with no intervention needed. ETCO2 levels were maintained between 30-35  Wen Chavez RT

## 2024-05-10 NOTE — PHARMACY-ADMISSION MEDICATION HISTORY
Pharmacist Admission Medication History    Admission medication history is complete. The information provided in this note is only as accurate as the sources available at the time of the update.    Information Source(s): Patient, Hospital records, and CareEverywhere/SureScripts via in-person    Pertinent Information: pt states taking eliquis, but did not know if he was taking it once or twice daily.  Sure scripts does not show fill for med.  Pt states he manages his own meds and might be taking it twice daily.  Left on PTA med list as BID.   Pt currently taking actos 30mg daily, but when this supply is gone, will be changing to 15mg daily    Changes made to PTA medication list:  Added: None  Deleted: ferrous sulfate 325mg daily, omeaga 3 1000mg daily, kcl 10 meq daily  Changed: balsalazide from 4500mg daily to 2250mg bid, tylenol pm from 3 tabs at bedtime to 2 at bedtime prn per pt    Allergies reviewed with patient and updates made in EHR: yes    Medication History Completed By: Lucy Stephens [facility] 5/10/2024 3:08 PM    PTA Med List   Medication Sig Last Dose    allopurinol (ZYLOPRIM) 100 MG tablet Take 200 mg by mouth daily 5/10/2024 at am    apixaban ANTICOAGULANT (ELIQUIS) 5 MG tablet Take 1 tablet (5 mg) by mouth 2 times daily 5/10/2024 at am    balsalazide (COLAZAL) 750 MG capsule Take 2,250 mg by mouth 2 times daily 5/10/2024 at am    Calcium Carbonate Antacid (MATHEUS-SELTZER HEARTBURN PO) Take 1 tablet by mouth daily as needed Past Month at ?    diphenhydrAMINE-acetaminophen (TYLENOL PM)  MG tablet Take 2 tablets by mouth nightly as needed Past Month at ?    furosemide (LASIX) 20 MG tablet Take 20 mg by mouth daily 5/10/2024 at am    lisinopril (ZESTRIL) 20 MG tablet Take 20 mg by mouth daily 5/10/2024 at am    metoprolol succinate ER (TOPROL XL) 25 MG 24 hr tablet Take 25 mg by mouth daily 5/10/2024 at am    pantoprazole (PROTONIX) 40 MG EC tablet Take 40 mg by mouth daily 5/10/2024 at am    pioglitazone  (ACTOS) 30 MG tablet Take 30 mg by mouth daily 5/10/2024 at am    sertraline (ZOLOFT) 100 MG tablet Take 150 mg by mouth daily 5/10/2024 at am

## 2024-05-10 NOTE — ED NOTES
[facility]  ED Nurse Handoff Report    ED Chief complaint: Fall and Shoulder Injury  . ED Diagnosis:   Final diagnoses:   Shoulder dislocation, right, initial encounter       Allergies:   Allergies   Allergen Reactions    Amoxicillin Rash       Code Status: Full Code    Activity level - Baseline/Home:  in bed.  Activity Level - Current:   in bed.   Lift room needed: No.   Bariatric: No   Needed: No   Isolation: No.   Infection: Not Applicable.     Respiratory status: Room air    Vital Signs (within 30 minutes):   Vitals:    05/10/24 1415 05/10/24 1415 05/10/24 1430 05/10/24 1430   BP: (!) 142/75 (!) 142/75 (!) 142/77    BP Location:       Pulse: 64 65  65   Resp: 22   19   Temp:       TempSrc:       SpO2: 100%   98%   Weight:       Height:           Cardiac Rhythm:  ,   Cardiac  Cardiac Rhythm: Normal sinus rhythm  Ectopy: None  Pain level: 0-10 Pain Scale: 7  Patient confused: No.   Patient Falls Risk: nonskid shoes/slippers when out of bed, arm band in place, and patient and family education.   Elimination Status:  due to void      Patient Report - Initial Complaint: Fall; Shoulder dislocation.   Focused Assessment: Pt denies any numbness/tingling in his R arm/hand. Pulse +2.      Abnormal Results:   Labs Ordered and Resulted from Time of ED Arrival to Time of ED Departure   HEMOGLOBIN - Abnormal       Result Value    Hemoglobin 9.0 (*)         XR Shoulder Right G/E 3 Views   Final Result   IMPRESSION: Anterior dislocation of the humeral head relative to the   glenoid. Fracture fragments at the posterior glenoid which may   originate from the posterior humeral head and/or the inferior glenoid   defect. Moderate acromioclavicular joint degenerative changes.      DONITA YU MD            SYSTEM ID:  [id number]          Treatments provided: Pain medication, XR, blood work, procedural sedation.   Family Comments: no family.  OBS brochure/video discussed/provided to patient:   NA  ED Medications:   Medications   flumazenil (ROMAZICON) injection 0.2 mg (has no administration in time range)   propofol (DIPRIVAN) injection 10 mg/mL vial (30 mg Intravenous $Given 5/10/24 1403)   HYDROmorphone (PF) (DILAUDID) injection 0.5 mg (0.5 mg Intravenous $Given 5/10/24 1233)   propofol (DIPRIVAN) injection 10 mg/mL vial (100 mg Intravenous $Given 5/10/24 1353)   HYDROmorphone (PF) (DILAUDID) injection 0.5 mg (0.5 mg Intravenous $Given 5/10/24 1435)       Drips infusing:  No  For the majority of the shift this patient was Green.   Interventions performed were patient declined warm blankets.    Sepsis treatment initiated: No    Cares/treatment/interventions/medications to be completed following ED care: Per orders    ED Nurse Name: Ana M Beckford RN  3:15 PM

## 2024-05-11 NOTE — OP NOTE
OPERATIVE NOTE    Name: Fer Myles  MRN: [id number]  Age: 63 year old    YOB: 1960    Date of Procedure: 5/10/2024      Pre-operative Diagnosis:   Right glenohumeral dislocation    Post-operative Diagnosis:    Same    Procedure:   Closed reduction of right glenohumeral joint  Assistant:  None    Anesthesia:  1. General    Complications:  None    Estimated blood loss:    None    Transfusions:  None    Fluids:  Per anesthesia    Specimens:  None    Components:  None    Indications:   This is a 63-year-old man who presented to the emergency department after sustaining an injury to his right shoulder.  He was found to have a right glenohumeral dislocation.  He underwent to attempt under conscious sedation for reduction of [facility].  He has never had surgery on the shoulder and has never injured before.  Due to the concern for the proximal humerus fracture seen on the CT, he was taken to the operating room for reduction under general anesthesia.      Informed Consent:  Preoperatively, the risks, benefits, and possible complications of the procedure were discussed. The risks discussed included but were not limited to wound healing complications, bleeding, transfusion, stiffness and dislocation and persistent pain. All of the questions were satisfactorily answered and the patient wished to proceed with surgery to improve their lifestyle and reduce their pain.         Description of Procedure:   Fer Myles was encountered in the preoperative area and consent was obtained from the patient/family with the patient awake and the correct operative site was marked. The patient was then brought back to the operative suite, placed in the supine position on the cart and brought under general anesthesia by the anesthesia provider.         Procedural Pause:   The patient's correct identity, side, site, and procedure to be performed was verified.  Intravenous antibiotics were administered prior to  skin incision.    Procedure details  Attention was then turned towards the patient.     A she was placed around the patient's abdomen and this was used for countertraction the arm was then pulled for traction and the proximal humeral head could be palpated and this was gently guided into the glenohumeral joint as the arm was then brought more to forward flexion and abduction.  A palpable clunk could be felt and the glenohumeral joint appears to be reduced clinically, was taken through range of motion was found to be fairly stable.  A portable x-ray was taken at this point both the Grashey and axillary view and this was found to confirm concentric reduction.  The patient was then placed in a sling and recovered from anesthesia in stable condition.    Postoperative plan:       The patient may be discharged in the sling.  He should follow-up in 2 weeks as an outpatient.  He did have some concern for likely complex dislocation involving the rotator cuff and he would likely benefit from an MRI if he is demonstrating significant weakness concerning for rotator cuff tear.      Andrea Moeller MD

## 2024-05-11 NOTE — ANESTHESIA POSTPROCEDURE EVALUATION
Patient: Fer Myles    Procedure: Procedure(s):  CLOSED REDUCTION RIGHT SHOULDER       Anesthesia Type:  General    Note:  Disposition: Outpatient   Postop Pain Control: Uneventful            Sign Out: Well controlled pain   PONV: No   Neuro/Psych: Uneventful            Sign Out: Acceptable/Baseline neuro status   Airway/Respiratory: Uneventful            Sign Out: Acceptable/Baseline resp. status   CV/Hemodynamics: Uneventful            Sign Out: Acceptable CV status; No obvious hypovolemia; No obvious fluid overload   Other NRE:    DID A NON-ROUTINE EVENT OCCUR? No           Last vitals:  Vitals Value Taken Time   /70 05/10/24 2106   Temp 97.2  F (36.2  C) 05/10/24 2059   Pulse 79 05/10/24 2106   Resp 21 05/10/24 2108   SpO2 98 % 05/10/24 2108   Vitals shown include unfiled device data.    Electronically Signed By: Olga Rg MD  May 10, 2024  10:39 PM
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
## 2024-05-11 NOTE — DISCHARGE INSTRUCTIONS
You may use your hand for activities of daily living but you should not lift anything heavier than a cup of coffee for the first week.   You should remain wearing the sling for the first week and then you may start to wean out of this as pain allows.

## 2024-05-11 NOTE — ANESTHESIA PROCEDURE NOTES
Airway       Patient location during procedure: OR       Procedure Start/Stop Times: 5/10/2024 8:42 PM  Staff -        CRNA: Elias Telles APRN CRNA       Performed By: CRNA  Consent for Airway        Urgency: elective  Indications and Patient Condition       Indications for airway management: roger-procedural       Induction type:intravenous       Mask difficulty assessment: 1 - vent by mask    Final Airway Details       Final airway type: endotracheal airway       Successful airway: ETT - single and Oral  Endotracheal Airway Details        ETT size (mm): 8.0       Cuffed: yes       Successful intubation technique: direct laryngoscopy       DL Blade Type: Bain 2       Grade View of Cords: 1       Adjucts: stylet       Position: Right       Measured from: gums/teeth       Secured at (cm): 22       Bite block used: None    Post intubation assessment        Placement verified by: capnometry, equal breath sounds and chest rise        Number of attempts at approach: 1       Number of other approaches attempted: 0       Secured with: tape       Ease of procedure: easy       Dentition: Intact    Medication(s) Administered   Medication Administration Time: 5/10/2024 8:42 PM
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
## 2024-05-11 NOTE — ANESTHESIA CARE TRANSFER NOTE
Patient: Fer Myles    Procedure: Procedure(s):  CLOSED REDUCTION RIGHT SHOULDER       Diagnosis: Shoulder dislocation, right, initial encounter [S43.004A]  Diagnosis Additional Information: No value filed.    Anesthesia Type:   General     Note:    Oropharynx: oropharynx clear of all foreign objects and spontaneously breathing  Level of Consciousness: awake  Oxygen Supplementation: face mask  Level of Supplemental Oxygen (L/min / FiO2): 6  Independent Airway: airway patency satisfactory and stable  Dentition: dentition unchanged  Vital Signs Stable: post-procedure vital signs reviewed and stable  Report to RN Given: handoff report given  Patient transferred to: PACU    Handoff Report: Identifed the Patient, Identified the Reponsible Provider, Reviewed the pertinent medical history, Discussed the surgical course, Reviewed Intra-OP anesthesia mangement and issues during anesthesia, Set expectations for post-procedure period and Allowed opportunity for questions and acknowledgement of understanding      Vitals:  Vitals Value Taken Time   BP     Temp     Pulse 81 05/10/24 2059   Resp 16 05/10/24 2059   SpO2 100 % 05/10/24 2059   Vitals shown include unfiled device data.    Electronically Signed By: OLGA Chamberlain CRNA  May 10, 2024  8:59 PM

## 2024-05-15 NOTE — PROGRESS NOTES
Infusion Nursing Note:  Fer Myles presents today for venofer 1/3.    Patient seen by provider today: No   present during visit today: Not Applicable.    Note: Pt received venofer infusion in Jan, 2024 and tolerated well.  Pt reports to having infection in left great toe with redness, swelling and oozing. Pt been followed by wound care team.     Intravenous Access:  Peripheral IV placed.    Treatment Conditions:  Not Applicable.      Post Infusion Assessment:  Patient tolerated infusion without incident.  Blood return noted pre and post infusion.  Site patent and intact, free from redness, edema or discomfort.  No evidence of extravasations.  Access discontinued per protocol.       Discharge Plan:   Discharge instructions reviewed with: Patient.  Patient and/or family verbalized understanding of discharge instructions and all questions answered.  AVS to patient via [employer].  Patient will return 5/24/24 for next appointment.   Patient discharged in stable condition accompanied by: self.  Departure Mode: Ambulatory.      Kamryn Russell RN

## 2024-05-16 NOTE — DISCHARGE INSTRUCTIONS
05/16/2024   Matias Myles   1960      A DME order was not completed because the supplies are ordered by home care:  Please order more Mepilex/foam dressings    [facility] Phone: [phone]; Fax: [phone]    PLAN:  -Keflex has been sent to your pharmacy. Take as directed. Take with probiotic and food if stomach upset.   -Left toe 2: may benefit from tenotomy (in-office procedure to straighten toe): If wound worsens, follow up with Dr. Diallo in [place]: [phone]  -Keep blood sugars below 150 and A1C below 7 (great job with this!)  -Purchase stocking radha at [facility] [id number] or online, such as a cage stocking radha.  -Ok to shower with dressing off prior to home care visit; Use of cast protector alternate times to shower    Wound Dressing Change: Right lateral lower leg  - Cleanse with mild unscented soap (such as Cetaphil, Cerave or Dove) and water  - Apply VASHE on gauze, to wound bed, for 10 minutes, remove gauze (do not rinse)  - Apply thick, high-quality moisturizer to intact skin on feet  - Apply thin layer of [place]'Dres Gel to wound bed or to piece of hydrofera blue  - Apply 1/8th piece of 4x5 Hydrofera Blue to wound bed  - Cover with Mepilex foam border 4x4 (or similar)  - Pull up compression stocking from home (remove at night)  Change 2 times per week    Wound Dressing Change: Left toe 1  - Cleanse with mild unscented soap (such as Cetaphil, Cerave or Dove) and water  -Paint open area, toenail, and periwound skin with betadine and let dry  -Cover with 1 2 x 2.375 Medipore +pad  Change 2 times per week    Left leg skin care, daily in the morning:  - If needed, cleanse with mild unscented soap (such as Cetaphil, Cerave or Dove) andwater  - Apply moisturizer to intact skin (such as CeraVe, Eucerin, Aquaphor or Vanicream)  - Apply compression stocking from home (remove at night)  Elevate: your legs above the level of your heart for 30 minutes: 2 times each day  - Lay on the  couch or your bed and prop your legs up on pillows  - Recline back as far as you can go in your recliner and prop your legs on pillows.  Low sodium, high protein diet: protein shakes or bars    A diet high in protein is important for wound healing, we recommend getting 90 grams of protein per day. Taking protein shakes or bars are a good way to get extra protein in your diet.   Good sources of protein:  Pork 26g per 3 oz  Whey protein powder - 24g per scoop (on average)  Greek yogurt - 23g per 8oz   Chicken or Turkey - 23g per 3oz  Fish - 20-25g per 3oz  Beef - 18-23g per 3oz  Tofu - 10g per 1/2 cup  Navy beans - 20g per cup  Cottage cheese - 14g per 1/2 cup   Lentils - 13g per 1/4 cup  Beef jerky 13g per 1oz  2% milk - 8g per cup  Peanut butter - 8g per 2 tablespoons  Eggs - 6g per egg  Mixed nuts - 6g per 2oz       Main Provider: Rosalina Estrada NP May 16, 2024    Call us at [phone] if you have any questions about your wounds, if you have redness or swelling around your wound, have a fever of 101 degrees Fahrenheit or greater or if you have any other problems or concerns. We answer the phone Monday through Friday 8 am to 4 pm, please leave a message as we check the voicemail frequently throughout the day. If you have a concern over the weekend, please leave a message and we will return your call Monday. If the need is urgent, go to the ER or urgent care.    If you had a positive experience please indicate that on your patient satisfaction survey form that [facility] will be sending you.    It was a pleasure meeting with you today.  Thank you for allowing me and my team the privilege of caring for you today.  YOU are the reason we are here, and I truly hope we provided you with the excellent service you deserve.  Please let us know if there is anything else we can do for you so that we can be sure you are leaving completely satisfied with your care experience.      If you have any billing related  questions please call the [facility] Business office at [phone]. The clinic staff does not handle billing related matters.    If you are scheduled to have a follow up appointment, you will receive a reminder call the day before your visit. On the appointment day please arrive 15 minutes prior to your appointment time. If you are unable to keep that appointment, please call the clinic to cancel or reschedule. If you are more than 10 minutes late or greater for your scheduled appointment time, the clinic policy is that you may be asked to reschedule.

## 2024-05-16 NOTE — PROGRESS NOTES
[facility]    ASSESSMENT:    Chronic toe ulcer, left with fat layer exposed (H)    Left big toe wound-cellulitis, versus infected hematoma  (L97.912) Chronic ulcer of right leg with fat layer exposed (H)      PLAN/DISCUSSION:   Approximately 1 week ago noted to have redness of his left big toe, continues to be red, but is not warm to touch. Small hematoma expelled. Will treat with Keflex for 10 days. Betadine to area.  Calf wound slightly worse in size and bioburden. Switch to wounddress and hydrofera.   Has wound on second digit on the left foot, discussed at last appointment with Dr. Diallo who recommended a tenotomy, he continues to want to monitor the wound     HISTORY OF PRESENT ILLNESS:   Fer Myles is a 63 year old male with a history of of a right foot hallux amputation on 10/21/2023 for osteomyelitis, hx of diabetes, hypertension, chronic lower extremity edema who presents today with numerous wounds, including previous surgical incision of the right hallux that dehisced. He also has a wound that started mid-December 2023 on his right second toe in addition to his right lateral calf. The right second toe wound continues to drain heavily. Denies fevers, chills, infection. He has not been wearing surgical shoe, no compression for the last few months due to generalized weakness.      Hx of diabetes with bilateral lower extremity neuropathy.   Patient Active Problem List   Diagnosis    paroxysmal atrial fib    Alcohol abuse    Hypertension    Morbid obesity (H)    Melena    Gastrointestinal hemorrhage, unspecified gastrointestinal hemorrhage type    Osteomyelitis of great toe of right foot (H)    Acute posthemorrhagic anemia    Chronic toe ulcer, right, with fat layer exposed (H)    Chronic ulcer of right leg with fat layer exposed (H)    Chronic toe ulcer, left, with fat layer exposed (H)    Osteomyelitis of right foot, unspecified type (H)    Sepsis, due to unspecified organism,  unspecified whether acute organ dysfunction present (H)    Iron deficiency anemia secondary to blood loss (chronic)       Current Outpatient Medications   Medication Sig Dispense Refill    acetaminophen (TYLENOL) 500 MG tablet Take 1-2 tablets (500-1,000 mg) by mouth every 6 hours as needed for mild pain 30 tablet 0    allopurinol (ZYLOPRIM) 100 MG tablet Take 200 mg by mouth daily      apixaban ANTICOAGULANT (ELIQUIS) 5 MG tablet Take 1 tablet (5 mg) by mouth 2 times daily 180 tablet 0    balsalazide (COLAZAL) 750 MG capsule Take 2,250 mg by mouth 2 times daily      Calcium Carbonate Antacid (MATHEUS-SELTZER HEARTBURN PO) Take 1 tablet by mouth daily as needed      cephALEXin (KEFLEX) 500 MG capsule Take 1 capsule (500 mg) by mouth 2 times daily for 10 days 20 capsule 0    Cyanocobalamin 1000 MCG/ML KIT       diphenhydrAMINE-acetaminophen (TYLENOL PM)  MG tablet Take 2 tablets by mouth nightly as needed      furosemide (LASIX) 20 MG tablet Take 20 mg by mouth daily      lisinopril (ZESTRIL) 20 MG tablet Take 20 mg by mouth daily      metoprolol succinate ER (TOPROL XL) 25 MG 24 hr tablet Take 25 mg by mouth daily      pantoprazole (PROTONIX) 40 MG EC tablet Take 40 mg by mouth daily      pioglitazone (ACTOS) 30 MG tablet Take 30 mg by mouth daily      sertraline (ZOLOFT) 100 MG tablet Take 150 mg by mouth daily       No current facility-administered medications for this encounter.       VITALS: /72 (BP Location: Left arm, Patient Position: Sitting)   Pulse 57   Temp (!) 96.5  F (35.8  C) (Temporal)      PHYSICAL EXAM:  GENERAL: Patient is alert and oriented and in no acute distress  INTEGUMENTARY:   Wound (Active)       Wound (used by [facility] only) 01/04/24 1229 Right lateral;lower leg ulceration, venous (Active)   Thickness/Stage full thickness 05/16/24 1048   Base granulating;hypergranulation 05/16/24 1048   Periwound edematous 05/16/24 1048   Periwound Temperature warm 05/16/24 1048   Periwound Skin  Turgor firm 05/16/24 1048   Edges open 05/16/24 1048   Length (cm) 2.7 05/16/24 1048   Width (cm) 2 05/16/24 1048   Depth (cm) 0.3 05/16/24 1048   Wound (cm^2) 5.4 cm^2 05/16/24 1048   Wound Volume (cm^3) 1.62 cm^3 05/16/24 1048   Wound healing % 32.5 05/16/24 1048   Tunneling [Depth (cm)/Location] . 02/05/24 1206   Undermining [Depth (cm)/Location] . 02/05/24 1206   Drainage Characteristics/Odor serosanguineous 05/16/24 1048   Drainage Amount moderate 05/16/24 1048   Care, Wound debrided 05/16/24 1048       [facility] 04/18/24 1111 Left dorsal 2 toe neuropathic ulcer (Active)   Thickness/Stage full thickness 05/16/24 1048   Base dry;brown 05/16/24 1048   Periwound pink;swelling 05/16/24 1048   Periwound Temperature warm 05/16/24 1048   Periwound Skin Turgor firm 05/16/24 1048   Edges rolled/closed 05/16/24 1048   Length (cm) 0 05/16/24 1048   Width (cm) 0 05/16/24 1048   Depth (cm) 0 05/16/24 1048   Wound (cm^2) 0 cm^2 05/16/24 1048   Wound Volume (cm^3) 0 cm^3 05/16/24 1048   Wound healing % 100 05/16/24 1048   Drainage Characteristics/Odor serous 04/18/24 1100   Drainage Amount none 05/16/24 1048   Care, Wound non-select wound debridement performed. 04/18/24 1100       Wound (used by [facility] only) 05/16/24 1103 Left 1 toe unspecified (Active)   Thickness/Stage full thickness 05/16/24 1048   Base red;pink 05/16/24 1048   Periwound redness;swelling 05/16/24 1048   Periwound Temperature warm 05/16/24 1048   Periwound Skin Turgor soft 05/16/24 1048   Edges open 05/16/24 1048   Length (cm) 0.3 05/16/24 1048   Width (cm) 1.5 05/16/24 1048   Depth (cm) 0.4 05/16/24 1048   Wound (cm^2) 0.45 cm^2 05/16/24 1048   Wound Volume (cm^3) 0.18 cm^3 05/16/24 1048   Drainage Characteristics/Odor sanguineous;creamy;brown;serosanguineous 05/16/24 1048   Drainage Amount moderate 05/16/24 1048   Care, Wound non-select wound debridement performed. 05/16/24 1048       Incision/Surgical Site 01/26/24 Anterior;Right Toe  (Comment  which one) (Active)       Incision/Surgical Site 05/10/24 Right Shoulder (Active)   Incision Assessment UT 05/10/24 4994                 PROCEDURES: 4% topical lidocaine was applied to the wound by the nursing staff. Patient was determined to be capable of making their own medical decisions and informed consent was obtained. Using a curette a selective debridement of non-viable tissue was performed of <20cm2. Hemostasis was achieved with pressure. The patient tolerated the procedure well.      PATIENT INSTRUCTIONS      Further instructions from your care team         05/16/2024   Matias Myles   1960      A DME order was not completed because the supplies are ordered by home care:  Please order more Mepilex/foam dressings    [facility] Phone: [phone]; Fax: [phone]    PLAN:  -Keflex has been sent to your pharmacy. Take as directed. Take with probiotic and food if stomach upset.   -Left toe 2: may benefit from tenotomy (in-office procedure to straighten toe): If wound worsens, follow up with Dr. Diallo in [place]: [phone]  -Keep blood sugars below 150 and A1C below 7 (great job with this!)  -Purchase stocking radha at [id number] or online, such as a cage stocking radha.  -Ok to shower with dressing off prior to home care visit; Use of cast protector alternate times to shower    Wound Dressing Change: Right lateral lower leg  - Cleanse with mild unscented soap (such as Cetaphil, Cerave or Dove) and water  - Apply VASHE on gauze, to wound bed, for 10 minutes, remove gauze (do not rinse)  - Apply thick, high-quality moisturizer to intact skin on feet  - Apply thin layer of [place]'Dres Gel to wound bed or to piece of hydrofera blue  - Apply 1/8th piece of 4x5 Hydrofera Blue to wound bed  - Cover with Mepilex foam border 4x4 (or similar)  - Pull up compression stocking from home (remove at night)  Change 2 times per week    Wound Dressing Change: Left toe 1  -  Cleanse with mild unscented soap (such as Cetaphil, Cerave or Dove) and water  -Paint open area, toenail, and periwound skin with betadine and let dry  -Cover with 1 2 x 2.375 Medipore +pad  Change 2 times per week    Left leg skin care, daily in the morning:  - If needed, cleanse with mild unscented soap (such as Cetaphil, Cerave or Dove) andwater  - Apply moisturizer to intact skin (such as CeraVe, Eucerin, Aquaphor or Vanicream)  - Apply compression stocking from home (remove at night)  Elevate: your legs above the level of your heart for 30 minutes: 2 times each day  - Lay on the couch or your bed and prop your legs up on pillows  - Recline back as far as you can go in your recliner and prop your legs on pillows.  Low sodium, high protein diet: protein shakes or bars    A diet high in protein is important for wound healing, we recommend getting 90 grams of protein per day. Taking protein shakes or bars are a good way to get extra protein in your diet.   Good sources of protein:  Pork 26g per 3 oz  Whey protein powder - 24g per scoop (on average)  Greek yogurt - 23g per 8oz   Chicken or Turkey - 23g per 3oz  Fish - 20-25g per 3oz  Beef - 18-23g per 3oz  Tofu - 10g per 1/2 cup  Navy beans - 20g per cup  Cottage cheese - 14g per 1/2 cup   Lentils - 13g per 1/4 cup  Beef jerky 13g per 1oz  2% milk - 8g per cup  Peanut butter - 8g per 2 tablespoons  Eggs - 6g per egg  Mixed nuts - 6g per 2oz       Main Provider: Rosalina Estrada NP May 16, 2024    Call us at [phone] if you have any questions about your wounds, if you have redness or swelling around your wound, have a fever of 101 degrees Fahrenheit or greater or if you have any other problems or concerns. We answer the phone Monday through Friday 8 am to 4 pm, please leave a message as we check the voicemail frequently throughout the day. If you have a concern over the weekend, please leave a message and we will return your call Monday. If the need is urgent, go to  the ER or urgent care.    If you had a positive experience please indicate that on your patient satisfaction survey form that [facility] will be sending you.    It was a pleasure meeting with you today.  Thank you for allowing me and my team the privilege of caring for you today.  YOU are the reason we are here, and I truly hope we provided you with the excellent service you deserve.  Please let us know if there is anything else we can do for you so that we can be sure you are leaving completely satisfied with your care experience.      If you have any billing related questions please call the [facility] Business office at [phone]. The clinic staff does not handle billing related matters.    If you are scheduled to have a follow up appointment, you will receive a reminder call the day before your visit. On the appointment day please arrive 15 minutes prior to your appointment time. If you are unable to keep that appointment, please call the clinic to cancel or reschedule. If you are more than 10 minutes late or greater for your scheduled appointment time, the clinic policy is that you may be asked to reschedule.          Electronically signed by Rosalina Estrada CNP on May 16, 2024

## 2024-05-29 NOTE — PROGRESS NOTES
Infusion Nursing Note:  Fer Myles presents today for Venofer 300 2/3.    Patient seen by provider today: No   present during visit today: Not Applicable.    Note: N/A.      Intravenous Access:  Peripheral IV placed.    Treatment Conditions:  Not Applicable.      Post Infusion Assessment:  Patient tolerated infusion without incident.  Blood return noted pre and post infusion.  Site patent and intact, free from redness, edema or discomfort.  No evidence of extravasations.  Access discontinued per protocol.       Discharge Plan:   Discharge instructions reviewed with: Patient.  Patient and/or family verbalized understanding of discharge instructions and all questions answered.  Patient discharged in stable condition accompanied by: self.  Departure Mode: Ambulatory.      Grace Case RN

## 2024-06-11 NOTE — PROGRESS NOTES
Infusion Nursing Note:  Fer Myles presents today for venofer 300 3/3.    Patient seen by provider today: No   present during visit today: Not Applicable.    Note: N/A.      Intravenous Access:  Peripheral IV placed.    Treatment Conditions:  Not Applicable.      Post Infusion Assessment:  Patient tolerated infusion without incident.  Blood return noted pre and post infusion.  Site patent and intact, free from redness, edema or discomfort.  No evidence of extravasations.  Access discontinued per protocol.       Discharge Plan:   Discharge instructions reviewed with: Patient.  Patient and/or family verbalized understanding of discharge instructions and all questions answered.  Patient discharged in stable condition accompanied by: self.  Departure Mode: Ambulatory.      Grace Case RN     Yes

## 2024-06-26 PROBLEM — L97.512 RIGHT FOOT ULCER, WITH FAT LAYER EXPOSED (H): Status: ACTIVE | Noted: 2024-01-01

## 2024-06-26 PROBLEM — D62 ACUTE POSTHEMORRHAGIC ANEMIA: Status: RESOLVED | Noted: 2023-01-01 | Resolved: 2024-01-01

## 2024-06-26 PROBLEM — E66.01 MORBID OBESITY (H): Chronic | Status: ACTIVE | Noted: 2021-12-29

## 2024-06-26 PROBLEM — I73.9 PAD (PERIPHERAL ARTERY DISEASE) (H): Chronic | Status: ACTIVE | Noted: 2024-01-01

## 2024-06-26 PROBLEM — I89.0 LYMPHEDEMA: Chronic | Status: ACTIVE | Noted: 2024-01-01

## 2024-06-26 PROBLEM — K92.1 MELENA: Status: RESOLVED | Noted: 2022-02-18 | Resolved: 2024-01-01

## 2024-06-26 PROBLEM — G62.9 NEUROPATHY: Chronic | Status: ACTIVE | Noted: 2024-01-01

## 2024-06-26 PROBLEM — L97.912 CHRONIC ULCER OF RIGHT LEG WITH FAT LAYER EXPOSED (H): Chronic | Status: ACTIVE | Noted: 2024-01-01

## 2024-06-26 PROBLEM — D50.0 IRON DEFICIENCY ANEMIA SECONDARY TO BLOOD LOSS (CHRONIC): Status: RESOLVED | Noted: 2024-01-01 | Resolved: 2024-01-01

## 2024-06-26 PROBLEM — K92.2 GASTROINTESTINAL HEMORRHAGE, UNSPECIFIED GASTROINTESTINAL HEMORRHAGE TYPE: Status: RESOLVED | Noted: 2022-02-18 | Resolved: 2024-01-01

## 2024-06-26 PROBLEM — L97.512: Status: RESOLVED | Noted: 2024-01-01 | Resolved: 2024-01-01

## 2024-06-26 PROBLEM — M86.9 OSTEOMYELITIS OF GREAT TOE OF RIGHT FOOT (H): Status: RESOLVED | Noted: 2023-01-01 | Resolved: 2024-01-01

## 2024-06-26 NOTE — DISCHARGE INSTRUCTIONS
06/26/2024   Matias Myles   1960    A DME order was not completed because the supplies are ordered by home care or at a care facility    Dressing changes outside of clinic are being performed by Home Care    [facility] Phone: [phone]; Fax: [phone]     PLAN:  - Left toe 2: may benefit from tenotomy (in-office procedure to straighten toe):   - Follow up with Dr. Diallo 7/1/24 regarding left Toe 1, 2 & 3  - Keep blood sugars below 150 and A1C below 7 (great job with this!)  - Purchase stocking radha at [id number] or online, such as a cage stocking radha.  - Ok to shower with dressing off prior to home care visit; Use of cast protector alternate times to shower     Wound Dressing Change: Right lateral lower leg  - Cleanse with mild unscented soap (such as Cetaphil, Cerave or Dove) and water  - Apply VASHE on gauze, to wound bed, for 10 minutes, remove gauze (do not rinse)  - Apply thick, high-quality moisturizer to intact skin on feet  - Apply Iodosorb Gel to alginate layer into wound to fill space  - Cover with 4x4 Mepilex with silicone border    - Apply Spandage 7 from base of toes to back of knee  - Pull up compression stocking (remove at night)  Change 2 times per week     Wound Dressing Change: Left toe 1  - Cleanse with mild unscented soap (such as Cetaphil, Cerave or Dove) and water  - Apply Iodosorb gel to alginate  - Secure with conforming roll gauze and medipore tape  Change 2 times per week     Left leg skin care, daily in the morning:  - If needed, cleanse with mild unscented soap (such as Cetaphil, Cerave or Dove) andwater  - Apply moisturizer to intact skin (such as CeraVe, Eucerin, Aquaphor or Vanicream)  - Apply Spandage 7 from base of toes to back of knee  - Apply compression stocking from home (remove at night)    Elevate: your legs above the level of your heart for 30 minutes: 2 times each day  - Lay on the couch or your bed and prop your legs up on pillows  -  Recline back as far as you can go in your recliner and prop your legs on pillows.  Low sodium, high protein diet: protein shakes or bars     A diet high in protein is important for wound healing, we recommend getting 90 grams of protein per day.   Taking protein shakes or bars are a good way to get extra protein in your diet.     Good sources of protein:  Pork 26g per 3 oz  Whey protein powder - 24g per scoop (on average)  Greek yogurt - 23g per 8oz   Chicken or Turkey - 23g per 3oz  Fish - 20-25g per 3oz  Beef - 18-23g per 3oz  Tofu - 10g per 1/2 cup  Navy beans - 20g per cup  Cottage cheese - 14g per 1/2 cup   Lentils - 13g per 1/4 cup  Beef jerky 13g per 1oz  2% milk - 8g per cup  Peanut butter - 8g per 2 tablespoons  Eggs - 6g per egg  Mixed nuts - 6g per 2oz         Main Provider: Elias Streeter CNP June 26, 2024    Call us at [phone] if you have any questions about your wounds, if you have redness or swelling around your wound, have a fever of 101 degrees Fahrenheit or greater or if you have any other problems or concerns. We answer the phone Monday through Friday 8 am to 4 pm, please leave a message as we check the voicemail frequently throughout the day. If you have a concern over the weekend, please leave a message and we will return your call Monday. If the need is urgent, go to the ER or urgent care.    If you had a positive experience please indicate that on your patient satisfaction survey form that [facility] will be sending you.    It was a pleasure meeting with you today.  Thank you for allowing me and my team the privilege of caring for you today.  YOU are the reason we are here, and I truly hope we provided you with the excellent service you deserve.  Please let us know if there is anything else we can do for you so that we can be sure you are leaving completely satisfied with your care experience.      If you have any billing related questions please call the [facility] Business office  at [phone]. The clinic staff does not handle billing related matters.    If you are scheduled to have a follow up appointment, you will receive a reminder call the day before your visit. On the appointment day please arrive 15 minutes prior to your appointment time. If you are unable to keep that appointment, please call the clinic to cancel or reschedule. If you are more than 10 minutes late or greater for your scheduled appointment time, the clinic policy is that you may be asked to reschedule.

## 2024-06-26 NOTE — PROGRESS NOTES
[facility]    ASSESSMENT:   (L97.522) Chronic toe ulcer, left, with fat layer exposed (H)  Left hallux new wound 6/26/24.  High risk for at least a partial amputation.  Second toe with stable eschar  (L97.912) Chronic ulcer of right leg with fat layer exposed (H)  Stable  (I73.9) PAD (peripheral artery disease) (H24)  (I89.0) Lymphedema  Neuropathy    PLAN/DISCUSSION:   Tip of left hallux with acute deterioration.  Reports it must have opened yesterday.  Neuropathy present.  Did obtain new shoes about a month ago, but does not wear them often.  Evaluated in clinic and did not notice any issues inside the shoe, but does not appear wide enough for the foot.  Will have him follow up with Dr. Diallo on Monday; high risk for worsening, needing amputation.  Second digit on the left foot is now a stable eschar.  Dr. Diallo previously recommended a tenotomy; he continues to want to monitor the wound.    INTERVAL Hx:  6/26/24: Left hallux is now open, full thickness.  Arranged for follow up with Dr. Diallo for Monday.  Left second toe stable eschar.  Right lateral leg stable.  Would benefit from more compression.    HISTORY OF PRESENT ILLNESS:   Fer Mylse is a 63 year old male with a history of a right hallux amputation on 10/21/23 for osteomyelitis.  He has a history of diabetes (controlled), hypertension, and chronic lower extremity edema.  He had issues with the surgical incision of the right hallux to include dehiscence.  He developed a wound on his right second toe in addition to his right lateral calf mid-December 2023.    VITALS: /75 (BP Location: Left arm, Patient Position: Chair, Cuff Size: Adult Regular)   Pulse 60   Temp 96.8  F (36  C) (Temporal)   Resp 15      PHYSICAL EXAM:  GENERAL: Patient is alert and oriented and in no acute distress  INTEGUMENTARY:   Wound (Active)       Wound (used by OP [facility] only) 01/04/24 1229 Right lateral;lower leg ulceration, venous (Active)    Thickness/Stage full thickness 06/26/24 1531   Base slough;moist 06/26/24 1531   Periwound intact;pink;edematous 06/26/24 1531   Periwound Temperature warm 06/26/24 1531   Periwound Skin Turgor firm 06/26/24 1531   Edges open 06/26/24 1531   Length (cm) 2.5 06/26/24 1531   Width (cm) 1.8 06/26/24 1531   Depth (cm) 0.3 06/26/24 1531   Wound (cm^2) 4.5 cm^2 06/26/24 1531   Wound Volume (cm^3) 1.35 cm^3 06/26/24 1531   Wound healing % 43.75 06/26/24 1531   Tunneling [Depth (cm)/Location] . 02/05/24 1206   Undermining [Depth (cm)/Location] . 02/05/24 1206   Drainage Characteristics/Odor serosanguineous 06/26/24 1531   Drainage Amount moderate 06/26/24 1531   Care, Wound debrided 06/26/24 1531       Wound (used by OP [facility] only) 04/18/24 1111 Left dorsal 2 toe neuropathic ulcer (Active)   Thickness/Stage full thickness 06/26/24 1531   Base dry;scab 06/26/24 1531   Periwound pink;swelling 06/26/24 1531   Periwound Temperature warm 06/26/24 1531   Periwound Skin Turgor firm 06/26/24 1531   Edges rolled/closed 06/26/24 1531   Length (cm) 0 05/16/24 1048   Width (cm) 0 05/16/24 1048   Depth (cm) 0 05/16/24 1048   Wound (cm^2) 0 cm^2 05/16/24 1048   Wound Volume (cm^3) 0 cm^3 05/16/24 1048   Wound healing % 100 05/16/24 1048   Drainage Characteristics/Odor serous 04/18/24 1100   Drainage Amount none 06/26/24 1531   Care, Wound non-select wound debridement performed. 04/18/24 1100       Wound (used by OP [facility] only) 05/16/24 1103 Left 1 toe unspecified (Active)   Thickness/Stage full thickness 06/26/24 1531   Base red;pink 06/26/24 1531   Periwound redness;swelling 06/26/24 1531   Periwound Temperature warm 06/26/24 1531   Periwound Skin Turgor soft 06/26/24 1531   Edges open 06/26/24 1531   Length (cm) 1.9 06/26/24 1531   Width (cm) 2.3 06/26/24 1531   Depth (cm) 0.4 06/26/24 1531   Wound (cm^2) 4.37 cm^2 06/26/24 1531   Wound Volume (cm^3) 1.75 cm^3 06/26/24 1531   Wound healing % -871.11 06/26/24 1531   Drainage  Characteristics/Odor serosanguineous 06/26/24 1531   Drainage Amount moderate 06/26/24 1531   Care, Wound debrided 06/26/24 1531       Incision/Surgical Site 01/26/24 Anterior;Right Toe (Comment  which one) (Active)       Incision/Surgical Site 05/10/24 Right Shoulder (Active)                 PROCEDURES: 4% topical lidocaine was applied to the wound bed. Patient was determined to be capable of making their own medical decisions and informed consent was obtained. Using a 15 blade a surgical debridement was performed down to and including subcutaneous tissue of <20cm2. Hemostasis was achieved with pressure. The patient tolerated the procedure well.      Left hallux - debridement of dystrophic nail with a nail nipper.    MDM: E&M appropriate due to new wound.  Two or more stable chronic illnesses.  One acute complicated injury.  Moderate risk.  21 minutes not including procedure time.    PATIENT INSTRUCTIONS      Further instructions from your care team         06/26/2024   Matias Myles   1960    A DME order was not completed because the supplies are ordered by home care or at a care facility    Dressing changes outside of clinic are being performed by Home Care    [facility] Phone: [phone]; Fax: [phone]     PLAN:  - Left toe 2: may benefit from tenotomy (in-office procedure to straighten toe):   - Follow up with Dr. Diallo 7/1/24 regarding left Toe 1, 2 & 3  - Keep blood sugars below 150 and A1C below 7 (great job with this!)  - Purchase stocking radha at [id number] or online, such as a cage stocking radha.  - Ok to shower with dressing off prior to home care visit; Use of cast protector alternate times to shower     Wound Dressing Change: Right lateral lower leg  - Cleanse with mild unscented soap (such as Cetaphil, Cerave or Dove) and water  - Apply VASHE on gauze, to wound bed, for 10 minutes, remove gauze (do not rinse)  - Apply thick, high-quality moisturizer to intact  skin on feet  - Apply Iodosorb Gel to alginate layer into wound to fill space  - Cover with 4x4 Mepilex with silicone border    - Apply Spandage 7 from base of toes to back of knee  - Pull up compression stocking (remove at night)  Change 2 times per week     Wound Dressing Change: Left toe 1  - Cleanse with mild unscented soap (such as Cetaphil, Cerave or Dove) and water  - Apply Iodosorb gel to alginate  - Secure with conforming roll gauze and medipore tape  Change 2 times per week     Left leg skin care, daily in the morning:  - If needed, cleanse with mild unscented soap (such as Cetaphil, Cerave or Dove) andwater  - Apply moisturizer to intact skin (such as CeraVe, Eucerin, Aquaphor or Vanicream)  - Apply Spandage 7 from base of toes to back of knee  - Apply compression stocking from home (remove at night)    Elevate: your legs above the level of your heart for 30 minutes: 2 times each day  - Lay on the couch or your bed and prop your legs up on pillows  - Recline back as far as you can go in your recliner and prop your legs on pillows.  Low sodium, high protein diet: protein shakes or bars     A diet high in protein is important for wound healing, we recommend getting 90 grams of protein per day.   Taking protein shakes or bars are a good way to get extra protein in your diet.     Good sources of protein:  Pork 26g per 3 oz  Whey protein powder - 24g per scoop (on average)  Greek yogurt - 23g per 8oz   Chicken or Turkey - 23g per 3oz  Fish - 20-25g per 3oz  Beef - 18-23g per 3oz  Tofu - 10g per 1/2 cup  Navy beans - 20g per cup  Cottage cheese - 14g per 1/2 cup   Lentils - 13g per 1/4 cup  Beef jerky 13g per 1oz  2% milk - 8g per cup  Peanut butter - 8g per 2 tablespoons  Eggs - 6g per egg  Mixed nuts - 6g per 2oz         Main Provider: Elias Streeter CNP June 26, 2024    Call us at [phone] if you have any questions about your wounds, if you have redness or swelling around your wound, have a fever of 101  degrees Fahrenheit or greater or if you have any other problems or concerns. We answer the phone Monday through Friday 8 am to 4 pm, please leave a message as we check the voicemail frequently throughout the day. If you have a concern over the weekend, please leave a message and we will return your call Monday. If the need is urgent, go to the ER or urgent care.    If you had a positive experience please indicate that on your patient satisfaction survey form that [facility] will be sending you.    It was a pleasure meeting with you today.  Thank you for allowing me and my team the privilege of caring for you today.  YOU are the reason we are here, and I truly hope we provided you with the excellent service you deserve.  Please let us know if there is anything else we can do for you so that we can be sure you are leaving completely satisfied with your care experience.      If you have any billing related questions please call the [facility] Business office at [phone]. The clinic staff does not handle billing related matters.    If you are scheduled to have a follow up appointment, you will receive a reminder call the day before your visit. On the appointment day please arrive 15 minutes prior to your appointment time. If you are unable to keep that appointment, please call the clinic to cancel or reschedule. If you are more than 10 minutes late or greater for your scheduled appointment time, the clinic policy is that you may be asked to reschedule.          Electronically signed by: Elias Streeter, IQRA, APRN, CNP, CWCN
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
## 2024-07-01 NOTE — DISCHARGE INSTRUCTIONS
07/01/2024   Matias Myles   1960      A DME order was not completed because the supplies are ordered by home care or at a care facility     Dressing changes outside of clinic are being performed by Home Care     [facility] Phone: [phone]; Fax: [phone]     PLAN:  -Tenotomy to left toe 1 today 7/1/24.   Possible tenotomy on toe 2 and 3  - Keep blood sugars below 150 and A1C below 7 (great job with this!)  - Purchase stocking radha at [facility] or online, such as a cage stocking radha.  - Ok to shower with dressing off prior to home care visit; Use of cast protector alternate times to shower    Tenotomy site on first toe:   -Keep area dry   -Keep area covered with the band-aid until Wednesday, Then no more dressings are needed.      Wound Dressing Change: Left toe 1  - Cleanse with mild unscented soap (such as Cetaphil, Cerave or Dove) and water  - Apply Iodosorb gel to alginate  - Secure with conforming roll gauze and medipore tape or spandage make sure the knot part is no on the wound bed  Change 2 times per week      Per Elias NP:  Wound Dressing Change: Right lateral lower leg  - Cleanse with mild unscented soap (such as Cetaphil, Cerave or Dove) and water  - Apply VASHE on gauze, to wound bed, for 10 minutes, remove gauze (do not rinse)  - Apply thick, high-quality moisturizer to intact skin on feet  - Apply Iodosorb Gel to alginate layer into wound to fill space  - Cover with 4x4 Mepilex with silicone border    - Apply Spandage 7 from base of toes to back of knee  - Pull up compression stocking (remove at night)  Change 2 times per week       Left leg skin care, daily in the morning:  - If needed, cleanse with mild unscented soap (such as Cetaphil, Cerave or Dove) andwater  - Apply moisturizer to intact skin (such as CeraVe, Eucerin, Aquaphor or Vanicream)  - Apply Spandage 7 from base of toes to back of knee  - Apply compression stocking from home (remove at night)      Elevate: your legs above the level of your heart for 30 minutes: 2 times each day  - Lay on the couch or your bed and prop your legs up on pillows  - Recline back as far as you can go in your recliner and prop your legs on pillows.  Low sodium, high protein diet: protein shakes or bars     A diet high in protein is important for wound healing, we recommend getting 90 grams of protein per day.   Taking protein shakes or bars are a good way to get extra protein in your diet.      Good sources of protein:  Pork 26g per 3 oz  Whey protein powder - 24g per scoop (on average)  Greek yogurt - 23g per 8oz   Chicken or Turkey - 23g per 3oz  Fish - 20-25g per 3oz  Beef - 18-23g per 3oz  Tofu - 10g per 1/2 cup  Navy beans - 20g per cup  Cottage cheese - 14g per 1/2 cup   Lentils - 13g per 1/4 cup  Beef jerky 13g per 1oz  2% milk - 8g per cup  Peanut butter - 8g per 2 tablespoons  Eggs - 6g per egg  Mixed nuts - 6g per 2oz     Main Provider: Jolanta Diallo D.P.M. July 1, 2024    Call us at [phone] if you have any questions about your wounds, if you have redness or swelling around your wound, have a fever of 101 degrees Fahrenheit or greater or if you have any other problems or concerns. We answer the phone Monday through Friday 8 am to 4 pm, please leave a message as we check the voicemail frequently throughout the day. If you have a concern over the weekend, please leave a message and we will return your call Monday. If the need is urgent, go to the ER or urgent care.    If you had a positive experience please indicate that on your patient satisfaction survey form that [facility] will be sending you.    It was a pleasure meeting with you today.  Thank you for allowing me and my team the privilege of caring for you today.  YOU are the reason we are here, and I truly hope we provided you with the excellent service you deserve.  Please let us know if there is anything else we can do for you so that we can be sure  you are leaving completely satisfied with your care experience.      If you have any billing related questions please call the [facility] Business office at [phone]. The clinic staff does not handle billing related matters.    If you are scheduled to have a follow up appointment, you will receive a reminder call the day before your visit. On the appointment day please arrive 15 minutes prior to your appointment time. If you are unable to keep that appointment, please call the clinic to cancel or reschedule. If you are more than 10 minutes late or greater for your scheduled appointment time, the clinic policy is that you may be asked to reschedule.

## 2024-07-15 NOTE — TELEPHONE ENCOUNTER
Patient called the clinic 7/15/24 stating he received a letter from [employer] denying his visits with Dr Diallo. Patient claims this has happened once before with a different healthcare provider and it was cleared up with a phone call from the providers office. Patient is requesting the [facility] to call [employer] regarding this newest denial and then update him when able    Member # [id number]  Claim # [id number]  [facility] [phone]

## 2024-07-15 NOTE — TELEPHONE ENCOUNTER
Call placed to [facility] and discussed claim denial. [facility] states there is not a denial and that the July 1st visit is still in process. All other claims have been paid. Call returned to patient to inform him of this. No further questions or concerns.

## 2024-07-19 NOTE — DISCHARGE INSTRUCTIONS
07/19/2024   Matias Myles   1960    Plan 07/19/2024   Dressings are performed by patient     A DME order for supplies has been placed to [employer]. If there are any issues with your order including not receiving the order please call our clinic at [phone]. Do not call [facility]. We are better able to help you. We can contact the [facility] rep to better assist than if you call the general [facility] number. We can provide a tracking number also if needed.     [facility] is now sending an ancillary kit free of charge. This kit includes gauze, gloves, and saline. You will receive 1 kit per 15 days of the supply order. We typically order 30 days of supplies so you will receive 2 kits. Please let us know if you would not like to receive this kit and we can communicate this to [facility].        PLAN:7/19/24  -HR was 50 today and regular, drink water and if you still have a low HR call cardiology clinic  -Tenotomy to left toe 1 today 7/1/24.  Possible tenotomy on toe 2 and 3  - Keep blood sugars below 150 and A1C below 7 (great job with this!)  - Purchase stocking radha at [facility] #[id number] or online, such as a cage stocking radha.  - Ok to shower with dressing off prior to home care visit; Use of cast protector alternate times to shower    Tenotomy site on first toe:  -Keep area dry  -Keep area covered with the band-aid until Wednesday, Then no more dressings are needed.    Wound Dressing Change: Left toe 1  - Cleanse with mild unscented soap (such as Cetaphil, Cerave or Dove) and water  - Apply Iodosorb gel to alginate  - Secure with conforming roll gauze and medipore tape or spandage make sure the knot part is no on the wound bed  Change 2 times per week    Per Rosalina NP  Wound Dressing Change: Right lateral lower leg  - Cleanse with mild unscented soap (such as Cetaphil, Cerave or Dove) and water  - Apply VASHE on gauze, to wound bed, for 10 minutes, remove gauze (do not rinse)  - Apply thick, high-quality  "moisturizer to intact skin on feet  - Apply 1/10th piece of 4x5\" Aquacel AG to wound bed   - Cover with 4x4 Mepilex with silicone border  - Apply Spandage 7 from base of toes to back of knee  - Pull up compression stocking (remove at night)  Change 2 times per week    Wound Dressing Change: Right back  -Cleanse with mild unscented soap and water (such as Cetaphil, Cerave or Dove)   -Apply 1 6x6 Mepilex border dressing  -Change 3 times per week       Left leg skin care, daily in the morning:  - If needed, cleanse with mild unscented soap (such as Cetaphil, Cerave or Dove) and water  - Apply moisturizer to intact skin (such as CeraVe, Eucerin, Aquaphor or Vanicream)  - Apply Spandage 7 from base of toes to back of knee  - Apply compression stocking from home (remove at night)      Elevate: your legs above the level of your heart for 30 minutes: 2 times each day  - Lay on the couch or your bed and prop your legs up on pillows  - Recline back as far as you can go in your recliner and prop your legs on pillows.  Low sodium, high protein diet: protein shakes or bars  A diet high in protein is important for wound healing, we recommend getting 90  grams of protein per day.  Taking protein shakes or bars are a good way to get extra protein in your diet.  Good sources of protein:  Pork 26g per 3 oz  Whey protein powder - 24g per scoop (on average)  Greek yogurt - 23g per 8oz  Chicken or Turkey - 23g per 3oz  Fish - 20-25g per 3oz  Beef - 18-23g per 3oz  Tofu - 10g per 1/2 cup  Navy beans - 20g per cup  Cottage cheese - 14g per 1/2 cup  Lentils - 13g per 1/4 cup  Beef jerky 13g per 1oz  2% milk - 8g per cup  Peanut butter - 8g per 2 tablespoons  Eggs - 6g per egg  Mixed nuts - 6g per 2oz     Main Provider: Rosalina Estrada NP July 19, 2024    Call us at [phone] if you have any questions about your wounds, if you have redness or swelling around your wound, have a fever of 101 degrees Fahrenheit or greater or if you have any " other problems or concerns. We answer the phone Monday through Friday 8 am to 4 pm, please leave a message as we check the voicemail frequently throughout the day. If you have a concern over the weekend, please leave a message and we will return your call Monday. If the need is urgent, go to the ER or urgent care.    If you had a positive experience please indicate that on your patient satisfaction survey form that [facility] will be sending you.    It was a pleasure meeting with you today.  Thank you for allowing me and my team the privilege of caring for you today.  YOU are the reason we are here, and I truly hope we provided you with the excellent service you deserve.  Please let us know if there is anything else we can do for you so that we can be sure you are leaving completely satisfied with your care experience.      If you have any billing related questions please call the [facility] Business office at [phone]. The clinic staff does not handle billing related matters.    If you are scheduled to have a follow up appointment, you will receive a reminder call the day before your visit. On the appointment day please arrive 15 minutes prior to your appointment time. If you are unable to keep that appointment, please call the clinic to cancel or reschedule. If you are more than 10 minutes late or greater for your scheduled appointment time, the clinic policy is that you may be asked to reschedule.

## 2024-07-19 NOTE — PROGRESS NOTES
[facility]    ASSESSMENT:   Chronic ulcer of right leg with fat layer exposed (H)-improved with skin island, drainage decreased  (L97.522) Chronic toe ulcer, left, with fat layer exposed-tenotomy/seeing Dr. Diallo next week  PAD  Lymphedema  Neuropathy  Back wound-superficial, serous drainage      PLAN/DISCUSSION:   Wearing his shoes he received approximately 2 months ago. Follow-up with Dr. Diallo on Monday, 7/22. Neuropathy present.   R leg wound minimal bioburden, and skin island present, will switch to Aquacel AG.   Back wound cover with Mepilex      HISTORY OF PRESENT ILLNESS:   Fer Myles is a 63 year old male with a history of of a right foot hallux amputation on 10/21/2023 for osteomyelitis, hx of diabetes, hypertension, chronic lower extremity edema who presents today with numerous wounds, including previous surgical incision of the right hallux that dehisced. He also has a wound that started mid-December 2023 on his right second toe in addition to his right lateral calf. The right second toe wound continues to drain heavily. Denies fevers, chills, infection. He has not been wearing surgical shoe, no compression for the last few months due to generalized weakness.      Hx of diabetes with bilateral lower extremity neuropathy.   Patient Active Problem List   Diagnosis    paroxysmal atrial fib    Hypertension    Morbid obesity (H)    Chronic ulcer of right leg with fat layer exposed (H)    Chronic toe ulcer, left, with fat layer exposed (H)    PAD (peripheral artery disease) (H24)    Lymphedema    Neuropathy       Current Outpatient Medications   Medication Sig Dispense Refill    acetaminophen (TYLENOL) 500 MG tablet Take 1-2 tablets (500-1,000 mg) by mouth every 6 hours as needed for mild pain 30 tablet 0    allopurinol (ZYLOPRIM) 100 MG tablet Take 200 mg by mouth daily      apixaban ANTICOAGULANT (ELIQUIS) 5 MG tablet Take 1 tablet (5 mg) by mouth 2 times daily 180 tablet 0     balsalazide (COLAZAL) 750 MG capsule Take 2,250 mg by mouth 2 times daily      Calcium Carbonate Antacid (MATHEUS-SELTZER HEARTBURN PO) Take 1 tablet by mouth daily as needed      Cyanocobalamin 1000 MCG/ML KIT       diphenhydrAMINE-acetaminophen (TYLENOL PM)  MG tablet Take 2 tablets by mouth nightly as needed      furosemide (LASIX) 20 MG tablet Take 20 mg by mouth daily      lisinopril (ZESTRIL) 20 MG tablet Take 20 mg by mouth daily      metoprolol succinate ER (TOPROL XL) 25 MG 24 hr tablet Take 25 mg by mouth daily      pantoprazole (PROTONIX) 40 MG EC tablet Take 40 mg by mouth daily      pioglitazone (ACTOS) 30 MG tablet Take 30 mg by mouth daily      sertraline (ZOLOFT) 100 MG tablet Take 150 mg by mouth daily       No current facility-administered medications for this encounter.       VITALS: There were no vitals taken for this visit.     PHYSICAL EXAM:  GENERAL: Patient is alert and oriented and in no acute distress  INTEGUMENTARY:   Wound (Active)       Wound (used by OP [facility] only) 01/04/24 1229 Right lateral;lower leg ulceration, venous (Active)   Thickness/Stage full thickness 07/19/24 0910   Base epithelialization;granulating;slough 07/19/24 0910   Periwound intact;pink;edematous 07/19/24 0910   Periwound Temperature warm 07/19/24 0910   Periwound Skin Turgor firm 07/19/24 0910   Edges open 07/19/24 0910   Length (cm) 2.5 07/19/24 0910   Width (cm) 1.5 07/19/24 0910   Depth (cm) 0.2 07/19/24 0910   Wound (cm^2) 3.75 cm^2 07/19/24 0910   Wound Volume (cm^3) 0.75 cm^3 07/19/24 0910   Wound healing % 53.13 07/19/24 0910   Tunneling [Depth (cm)/Location] . 02/05/24 1206   Undermining [Depth (cm)/Location] . 02/05/24 1206   Drainage Characteristics/Odor serosanguineous 07/19/24 0910   Drainage Amount moderate 07/19/24 0910   Care, Wound debrided 06/26/24 1531       Wound (used by OP [facility] only) 04/18/24 1111 Left dorsal 2 toe neuropathic ulcer (Active)   Thickness/Stage full thickness 07/01/24 1417   Base  dry;scab 07/01/24 1417   Periwound pink;swelling 07/01/24 1417   Periwound Temperature warm 07/01/24 1417   Periwound Skin Turgor firm 07/01/24 1417   Edges rolled/closed 07/01/24 1417   Length (cm) 0 05/16/24 1048   Width (cm) 0 05/16/24 1048   Depth (cm) 0 05/16/24 1048   Wound (cm^2) 0 cm^2 05/16/24 1048   Wound Volume (cm^3) 0 cm^3 05/16/24 1048   Wound healing % 100 05/16/24 1048   Drainage Characteristics/Odor serous 04/18/24 1100   Drainage Amount none 07/01/24 1417   Care, Wound non-select wound debridement performed. 04/18/24 1100       [facility] 05/16/24 1103 Left 1 toe unspecified (Active)   Thickness/Stage full thickness 07/01/24 1417   Base red;pink 07/01/24 1417   Periwound redness;swelling 07/01/24 1417   Periwound Temperature warm 07/01/24 1417   Periwound Skin Turgor soft 07/01/24 1417   Edges open 07/01/24 1417   Length (cm) 1.7 07/01/24 1417   Width (cm) 2.2 07/01/24 1417   Depth (cm) 0.2 07/01/24 1417   Wound (cm^2) 3.74 cm^2 07/01/24 1417   Wound Volume (cm^3) 0.75 cm^3 07/01/24 1417   Wound healing % -731.11 07/01/24 1417   Drainage Characteristics/Odor serosanguineous 07/01/24 1417   Drainage Amount moderate 07/01/24 1417   Care, Wound debrided;other (see comments) 07/01/24 1417       [facility]ound [facility] 07/19/24 0919 back Right (Active)   Thickness/Stage full thickness 07/19/24 0910   Base pink;red 07/19/24 0910   Periwound blue/purple 07/19/24 0910   Periwound Temperature warm 07/19/24 0910   Periwound Skin Turgor soft 07/19/24 0910   Edges open 07/19/24 0910   Length (cm) 3 07/19/24 0910   Width (cm) 3.7 07/19/24 0910   Depth (cm) 0.1 07/19/24 0910   Wound (cm^2) 11.1 cm^2 07/19/24 0910   Wound Volume (cm^3) 1.11 cm^3 07/19/24 0910   Drainage Characteristics/Odor serosanguineous 07/19/24 0910   Drainage Amount moderate 07/19/24 0910   Care, Wound non-select wound debridement performed. 07/19/24 0910       Incision/Surgical Site 01/26/24 Anterior;Right Toe (Comment   "which one) (Active)       Incision/Surgical Site 05/10/24 Right Shoulder (Active)               PROCEDURES: Mechanical debridement was performed with cleansing of the wound by the nursing staff      PATIENT INSTRUCTIONS      Further instructions from your care team         07/19/2024   Matias Myles   1960        Plan 07/19/2024   A DME order was not completed because the supplies are ordered by home care or at a care facility  Dressing changes outside of clinic are being performed by Home Care    [facility] Phone: [phone]; Fax: [phone]    PLAN:  -Tenotomy to left toe 1 today 7/1/24.  Possible tenotomy on toe 2 and 3  - Keep blood sugars below 150 and A1C below 7 (great job with this!)  - Purchase stocking radha at [facility] #[id number] or online, such as a cage stocking radha.  - Ok to shower with dressing off prior to home care visit; Use of cast protector alternate times to shower    Tenotomy site on first toe:  -Keep area dry  -Keep area covered with the band-aid until Wednesday, Then no more dressings are needed.    Wound Dressing Change: Left toe 1  - Cleanse with mild unscented soap (such as Cetaphil, Cerave or Dove) and water  - Apply Iodosorb gel to alginate  - Secure with conforming roll gauze and medipore tape or spandage make sure the knot part is no on the wound bed  Change 2 times per week    Per Rosalina NP  Wound Dressing Change: Right lateral lower leg  - Cleanse with mild unscented soap (such as Cetaphil, Cerave or Dove) and water  - Apply VASHE on gauze, to wound bed, for 10 minutes, remove gauze (do not rinse)  - Apply thick, high-quality moisturizer to intact skin on feet  - Apply 1/10th piece of 4x5\" Aquacel AG to wound bed   - Cover with 4x4 Mepilex with silicone border  - Apply Spandage 7 from base of toes to back of knee  - Pull up compression stocking (remove at night)  Change 2 times per week    Wound Dressing Change: Right back  -Cleanse with mild unscented " soap and water (such as Cetaphil, Cerave or Dove)   -Apply 1 6x6 Mepilex border dressing  -Change 3 times per week       Left leg skin care, daily in the morning:  - If needed, cleanse with mild unscented soap (such as Cetaphil, Cerave or Dove) and water  - Apply moisturizer to intact skin (such as CeraVe, Eucerin, Aquaphor or Vanicream)  - Apply Spandage 7 from base of toes to back of knee  - Apply compression stocking from home (remove at night)      Elevate: your legs above the level of your heart for 30 minutes: 2 times each day  - Lay on the couch or your bed and prop your legs up on pillows  - Recline back as far as you can go in your recliner and prop your legs on pillows.  Low sodium, high protein diet: protein shakes or bars  A diet high in protein is important for wound healing, we recommend getting 90  grams of protein per day.  Taking protein shakes or bars are a good way to get extra protein in your diet.  Good sources of protein:  Pork 26g per 3 oz  Whey protein powder - 24g per scoop (on average)  Greek yogurt - 23g per 8oz  Chicken or Turkey - 23g per 3oz  Fish - 20-25g per 3oz  Beef - 18-23g per 3oz  Tofu - 10g per 1/2 cup  Navy beans - 20g per cup  Cottage cheese - 14g per 1/2 cup  Lentils - 13g per 1/4 cup  Beef jerky 13g per 1oz  2% milk - 8g per cup  Peanut butter - 8g per 2 tablespoons  Eggs - 6g per egg  Mixed nuts - 6g per 2oz     Main Provider: Rosalina Estrada NP July 19, 2024    Call us at [phone] if you have any questions about your wounds, if you have redness or swelling around your wound, have a fever of 101 degrees Fahrenheit or greater or if you have any other problems or concerns. We answer the phone Monday through Friday 8 am to 4 pm, please leave a message as we check the voicemail frequently throughout the day. If you have a concern over the weekend, please leave a message and we will return your call Monday. If the need is urgent, go to the ER or urgent care.    If you had a  positive experience please indicate that on your patient satisfaction survey form that [facility] will be sending you.    It was a pleasure meeting with you today.  Thank you for allowing me and my team the privilege of caring for you today.  YOU are the reason we are here, and I truly hope we provided you with the excellent service you deserve.  Please let us know if there is anything else we can do for you so that we can be sure you are leaving completely satisfied with your care experience.      If you have any billing related questions please call the [facility] Business office at [phone]. The clinic staff does not handle billing related matters.    If you are scheduled to have a follow up appointment, you will receive a reminder call the day before your visit. On the appointment day please arrive 15 minutes prior to your appointment time. If you are unable to keep that appointment, please call the clinic to cancel or reschedule. If you are more than 10 minutes late or greater for your scheduled appointment time, the clinic policy is that you may be asked to reschedule.          Electronically signed by Rosalina Estrada CNP on July 19, 2024

## 2024-07-22 NOTE — ADDENDUM NOTE
Encounter addended by: Aidee Acosta RN on: 7/22/2024 12:29 PM   Actions taken: Edited Discharge Instructions

## 2024-07-22 NOTE — DISCHARGE INSTRUCTIONS
07/22/2024   Matias Myles   1960    A DME order was not completed because supplies were not needed for left toe 1  A supply order was sent on 7/19/24 for your leg and back to [place].    Dressing changes outside of clinic are being performed by self; home care is discharged     PLAN: 7/22/24  - anticipated healed to left toe 1 by 8/12/24 visit; will re eval for need for tenotomy to left toe 2 on 8/12/24 visit  - continue with same treatment plan for   - hold off on Tenotomy to left toe 2, 7/22/24.   - Keep blood sugars below 150 and A1C below 7 (great job with this!)  - Purchase stocking radha at [facility] #[id number] or online, such as a cage stocking radha.  - Ok to shower with dressing off prior to home care visit; Use of cast protector alternate times to shower     Wound Dressing Change: Left toe 1  - Cleanse with mild unscented soap (such as Cetaphil, Cerave or Dove) and water  - Apply Iodosorb gel to alginate  - Secure with conforming roll gauze and medipore tape or spandage make sure the knot part is no on the wound bed  Change 2 times per week        A diet high in protein is important for wound healing, we recommend getting 90 grams of protein per day.   Taking protein shakes or bars are a good way to get extra protein in your diet.      Good sources of protein:  Pork 26g per 3 oz  Whey protein powder - 24g per scoop (on average)  Greek yogurt - 23g per 8oz   Chicken or Turkey - 23g per 3oz  Fish - 20-25g per 3oz  Beef - 18-23g per 3oz  Tofu - 10g per 1/2 cup  Navy beans - 20g per cup  Cottage cheese - 14g per 1/2 cup   Lentils - 13g per 1/4 cup  Beef jerky 13g per 1oz  2% milk - 8g per cup  Peanut butter - 8g per 2 tablespoons  Eggs - 6g per egg  Mixed nuts - 6g per 2oz        Main Provider: SWAPNA BeltranPTYRONE. July 22, 2024    Call us at [phone] if you have any questions about your wounds, if you have redness or swelling around your wound, have a fever of 101 degrees Fahrenheit or  greater or if you have any other problems or concerns. We answer the phone Monday through Friday 8 am to 4 pm, please leave a message as we check the voicemail frequently throughout the day. If you have a concern over the weekend, please leave a message and we will return your call Monday. If the need is urgent, go to the ER or urgent care.    If you had a positive experience please indicate that on your patient satisfaction survey form that [facility] will be sending you.    It was a pleasure meeting with you today.  Thank you for allowing me and my team the privilege of caring for you today.  YOU are the reason we are here, and I truly hope we provided you with the excellent service you deserve.  Please let us know if there is anything else we can do for you so that we can be sure you are leaving completely satisfied with your care experience.      If you have any billing related questions please call the [facility] Business office at [phone]. The clinic staff does not handle billing related matters.    If you are scheduled to have a follow up appointment, you will receive a reminder call the day before your visit. On the appointment day please arrive 15 minutes prior to your appointment time. If you are unable to keep that appointment, please call the clinic to cancel or reschedule. If you are more than 10 minutes late or greater for your scheduled appointment time, the clinic policy is that you may be asked to reschedule.
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
## 2024-08-06 NOTE — TELEPHONE ENCOUNTER
Voicemail left for patient inquiring if able to change appointment times as new appointment opening became available on 8-12-24 and his is currently on overbooked time. Awaiting call back. Currently 1200, 1220, 1440 available.

## 2024-08-13 NOTE — PHARMACY-ADMISSION MEDICATION HISTORY
Medication reconciliation interview completed by pre-admitting nurse, reviewed by pharmacy.     Changes made to PTA medication list:  Added: Vit B 12 inj  Deleted: tylenol pm  Changed: pioglitazone    No further clarifications needed.     Prior to Admission medications    Medication Sig Last Dose Taking? Auth Provider Long Term End Date   acetaminophen (TYLENOL) 500 MG tablet Take 1-2 tablets (500-1,000 mg) by mouth every 6 hours as needed for mild pain  Yes Andrea Moeller MD     allopurinol (ZYLOPRIM) 100 MG tablet Take 200 mg by mouth daily  Yes Unknown, Entered By History     balsalazide (COLAZAL) 750 MG capsule Take 2,250 mg by mouth 2 times daily  Yes Unknown, Entered By History     Calcium Carbonate Antacid (MATHEUS-SELTZER HEARTBURN PO) Take 1 tablet by mouth daily as needed  Yes Unknown, Entered By History     cyanocobalamin (CYANOCOBALAMIN) 1000 MCG/ML injection Inject 1 mL into the muscle every 7 days  Yes Unknown, Entered By History     ferrous sulfate (FE TABS) 325 (65 Fe) MG EC tablet Take 325 mg by mouth daily  Yes Unknown, Entered By History     folic acid (FOLVITE) 1 MG tablet Take 1 tablet by mouth daily at 2 pm  Yes Reported, Patient     furosemide (LASIX) 20 MG tablet Take 20 mg by mouth daily  Yes Unknown, Entered By History No    JARDIANCE 25 MG TABS tablet Take 1 tablet by mouth daily at 2 pm  Yes Reported, Patient     lisinopril (ZESTRIL) 20 MG tablet Take 20 mg by mouth daily  Yes Reported, Patient     metoprolol succinate ER (TOPROL XL) 25 MG 24 hr tablet Take 25 mg by mouth daily  Yes Unknown, Entered By History Yes    pioglitazone (ACTOS) 15 MG tablet Take 15 mg by mouth daily  Yes Unknown, Entered By History     sertraline (ZOLOFT) 100 MG tablet Take 150 mg by mouth daily  Yes Reported, Patient

## 2024-08-14 NOTE — ANESTHESIA PROCEDURE NOTES
Airway       Patient location during procedure: OR       Procedure Start/Stop Times: 8/14/2024 10:53 AM  Staff -        CRNA: Jonh Hsu APRN CRNA       Performed By: CRNAIndications and Patient Condition       Indications for airway management: roger-procedural and airway protection       Induction type:intravenous       Mask difficulty assessment: 1 - vent by mask    Final Airway Details       Final airway type: endotracheal airway       Successful airway: ETT - single  Endotracheal Airway Details        ETT size (mm): 8.0       Cuffed: yes       Successful intubation technique: direct laryngoscopy       DL Blade Type: MAC 3       Grade View of Cords: 1       Adjucts: stylet       Position: Left       Measured from: gums/teeth       Secured at (cm): 23       Bite block used: None    Post intubation assessment        Placement verified by: capnometry, equal breath sounds and chest rise        Number of attempts at approach: 1       Secured with: tape       Ease of procedure: easy       Dentition: Intact    Medication(s) Administered   Medication Administration Time: 8/14/2024 10:53 AM
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
## 2024-08-14 NOTE — DISCHARGE INSTRUCTIONS
Post-Operative Instructions (Shoulder Reconstruction) Discharge    PLEASE BE ADVISED THAT THESE ARE GENERAL INSTRUCTIONS. If you were instructed differently by the surgical team, please utilize those instructions.    Wound Care/Showering  [x]  Keep the bandage clean and dry.   [x]  Remove your outer dressing in 3-5 days.    [x]  Do not remove the steristrip bandages that are on the skin. Those will fall off on their own   You may shower after the 3rd day (after removing the bandages).  Do not soak the incision (no bathing in the tub, no hot tubs and no swimming pools)   Do not use lotions or ointments on your incision   Use the ice machine or an ice pack on your operative shoulder six to eight times per day for 20-30 minutes for the first 48-72 hours after surgery and as desired after that time.    Keep a thin towel or cloth between the ice and your skin to prevent an ice burn.  Check skin to ensure there is no skin damage due to the ice pack.    Precautions  [x] Wear your arm sling at all times   [x] You should remove the sling to GENTLY straighten and bend your elbow at least three times per day.   [x] You should use your non-operative arm to move the arm that has been operated on. You should not be actively moving this arm on its own.  [x] You may remove the sling to shower; let your arm hang gently by your side     For instructions on how to adjust your sling, please go to the following website: [email or web address]   Do not drive until you are examined and cleared to do so at your follow-up appointment and are no longer taking pain medication.     For comfort, elevate the head of your bed approximately 45 degrees.  You may be more comfortable sleeping in a lounge type chair for the first few days after your procedure.   Check the color, sensation, and movement in your fingers/fingertips of the operative hand frequently during the first few days after  surgery.  Please call the office if you experience changes such         as bluish fingers, or if you are unable to move your hand or fingers.      Pain Medications:    You have been prescribed:  [ x] Tylenol 1000mg every 8 hours for at least 3 days after your surgery. You may continue longer as needed.    You may continue to take your Gabapentin    You have also been prescribed:  [x ] Oxycodone 5-10mg (1-2 Tablets) every 4-6 hours as needed for pain.         ? These are narcotic medications. These should be weaned off of as soon as possible. You only need to take these medications if you are having increased pain.    ? They can cause constipation in some people. Be certain to drink plenty of water, (six to eight ounce glasses, eight times per day). Stay on an over the counter laxative (Colace, Senekot, Metamucil) of your choosing while on narcotic pain medication.     Blood Clot Prevention:  Please pick this medication up at your pharmacy. It is an over-the-counter medication.  [ ] Aspirin 81 mg by mouth once per day for 2 weeks (take with food) unless otherwise instructed    Antibiotic (Infection Prevention)  Please pick this medication up at your pharmacy.   [ x] Keflex 500mg: Take every 6 hours for 2 days      Post-Operative Medication Instructions  **PLEASE BE ADVISED THAT THESE ARE GENERAL INSTRUCTIONS**  Your medication regimen should be adjusted for any medical conditions, allergies or medication interactions you may have  If you have any questions, please reach out to the surgical team or your primary care provider    Please take the following medications to help with your pain:  1. Tylenol 1000mg: Take 2 tablets every 8 hours for up to 2 weeks  2. Ibuprofen 800mg: Take 1 tablet every 6-8 hours for up to 2 weeks  3. Gabapentin 300mg: Take 1 tablet every 8 hours for up to 2 weeks  4. Oxycodone 5mg: Take 1 tablet every 4-6 hours as needed for breakthrough pain relief ONLY if the first three medications are not  effective.  **This is a narcotic pain medication. This should be weaned off as soon as possible. This can cause constipation in some people. Drink plenty of water. Stay on over-the-counter laxative such as  Senakot, Dulcolax, Metamucil, Milk of Magnesia  while taking this medication.**      Sample Medication Schedule:        If your pain is controlled, stop using the medications in this order:  ? First, discontinue Oxycodone  ? Next, discontinue Gabapentin. You should discontinue earlier for any side effects including confusion or dizziness  ? Then, discontinue Ibuprofen  ? Finally, discontinue Tylenol  Follow Up:    You need to be seen by Dr. Moeller approximately 14 days after surgery.    Please call our office to confirm your date if you are unsure:  [phone]   For any problems or questions please contact the office at [phone].  For urgent problems on evenings, nights, or weekends, there is an on-call physician available at this number.    For any emergencies call [phone] immediately.

## 2024-08-14 NOTE — ANESTHESIA POSTPROCEDURE EVALUATION
Patient: Fer Myles    Procedure: Procedure(s):  Right shoulder arthroscopy, debridement, decompression, rotator cuff repair, biceps tenodesis, use of allograft       Anesthesia Type:  General    Note:  Disposition: Outpatient   Postop Pain Control: Uneventful            Sign Out: Well controlled pain   PONV: No   Neuro/Psych: Uneventful            Sign Out: Acceptable/Baseline neuro status   Airway/Respiratory: Uneventful            Sign Out: Acceptable/Baseline resp. status   CV/Hemodynamics: Uneventful            Sign Out: Acceptable CV status; No obvious hypovolemia; No obvious fluid overload   Other NRE: NONE   DID A NON-ROUTINE EVENT OCCUR? No           Last vitals:  Vitals Value Taken Time   /65 08/14/24 1620   Temp 97  F (36.1  C) 08/14/24 1538   Pulse 57 08/14/24 1635   Resp 17 08/14/24 1635   SpO2 98 % 08/14/24 1635   Vitals shown include unfiled device data.    Electronically Signed By: John Reyes MD  August 14, 2024  4:36 PM

## 2024-08-14 NOTE — ANESTHESIA PROCEDURE NOTES
Brachial plexus Procedure Note    Pre-Procedure   Staff -        Anesthesiologist:  John Reyes MD       Performed By: anesthesiologist       Referred By: Sajan       Location: pre-op       Procedure Start/Stop Times: 8/14/2024 10:31 AM and 8/14/2024 10:41 AM       Pre-Anesthestic Checklist: patient identified, IV checked, site marked, risks and benefits discussed, informed consent, monitors and equipment checked, pre-op evaluation, at physician/surgeon's request and post-op pain management  Timeout:       Correct Patient: Yes        Correct Procedure: Yes        Correct Site: Yes        Correct Position: Yes        Correct Laterality: Yes        Site Marked: Yes  Procedure Documentation  Procedure: Brachial plexus       Laterality: right       Patient Position: supine       Patient Prep/Sterile Barriers: sterile gloves, mask       Skin prep: Chloraprep (interscalene approach).       Needle Type: insulated       Needle Gauge: 22.        Needle Length (Inches): 2        Ultrasound guided       1. Ultrasound was used to identify targeted nerve, plexus, vascular marker, or fascial plane and place a needle adjacent to it in real-time.       2. Ultrasound was used to visualize the spread of anesthetic in close proximity to the above referenced structure.    Assessment/Narrative         The placement was negative for: blood aspirated, painful injection and site bleeding       Paresthesias: No.       Test dose of mL at.         Test dose negative, 3 minutes after injection, for signs of intravascular, subdural, or intrathecal injection.       Bolus given via needle..        Secured via.        Insertion/Infusion Method: Single Shot       Complications: none       Injection made incrementally with aspirations every 5 mL.    Medication(s) Administered   Bupivacaine 0.5% w/ 1:200K Epi (Other) - Other   30 mL - 8/14/2024 10:31:00 AM  Medication Administration Time: 8/14/2024 10:31 AM     Comments:  The surgeon has  "given a verbal order transferring care of this patient to me for the performance of a regional analgesia block for post-op pain control. It is requested of me because I am uniquely trained and qualified to perform this block and the surgeon is neither trained nor qualified to perform this procedure.    30 ml 0.5% bupivacaine with 1:200k epi        FOR [facility] ([facility]) ONLY:   Pain Team Contact information: please page the Pain Team Via [employer]. Search \"Pain\". During daytime hours, please page the attending first. At night please page the resident first.      "

## 2024-08-14 NOTE — BRIEF OP NOTE
[facility]    Brief Operative Note    Pre-operative diagnosis: Complete rotator cuff tear [M75.120]  Post-operative diagnosis massive rotator cuff tear involving supraspinatus infraspinatus teres minor and subscapularis, biceps tenosynovitis with pulley lesion/tendon dislocation    Procedure: Right shoulder arthroscopy, debridement, decompression, rotator cuff repair, biceps tenodesis, use of allograft, Right - Shoulder    Surgeon: Surgeons and Role:     * Andrea Moeller MD - Primary  Maico Biswas PA-C  Anesthesia: General with Block   Estimated Blood Loss: Less than 50 ml    Drains: None  Specimens: * No specimens in log *  Findings:   None.  Complications: None.  Implants:   Implant Name Type Inv. Item Serial No.  Lot No. LRB No. Used Action   2.8mm Q-Fix All-suture anchor with two minitape sutures Metallic Hardware/[place]   [employer] [id number] Right 1 Implanted   healicoil PK 5.5mm suture anchor with one ultratape (cobraid blue) and one ultrabraid (#2) suture (blue)    [employer] [id number] Right 1 Wasted   ANCHOR SUTURE 5.5MM HEALICOIL [id number] - [id number] Metallic Hardware/[place] ANCHOR SUTURE 5.5MM HEALICOIL [id number]  [employer] [id number] Right 1 Implanted   IMP ANCHOR SUTURE HEALICOIL PK 5.5MM [id number] - [id number] Metallic Hardware/[place] IMP ANCHOR SUTURE HEALICOIL PK 5.5MM [id number]  [employer] [id number] Right 1 Implanted   ANCHOR SUTURE 5.5MM HEALICOIL [id number] - [id number] Metallic Hardware/[place] ANCHOR SUTURE 5.5MM HEALICOIL [id number]  [employer] [id number] Right 1 Implanted   2.8 mm Q-Fix all-suture anchor with TWO minitape sutures (blue, cobraid white) Metallic Hardware/[place]   [employer] [id number] Right 1 Implanted   IMP ANCHOR FOOTPRINT S&N ULTRA PK 5.5MM [id number] - [id number] Metallic Hardware/[place] IMP ANCHOR FOOTPRINT S&N ULTRA PK 5.5MM [id number]  [employer] [id number] Right 1 Wasted   IMP ANCHOR FOOTPRINT S&N ULTRA PK 5.5MM  [id number] - [id number] Metallic Hardware/[place] IMP ANCHOR FOOTPRINT S&N ULTRA PK 5.5MM [id number]  [employer] [id number] Right 1 Wasted   5.5 mm multifix S ultra knotless suture anchor    [employer] [id number] Right 1 Implanted   5.5 multifix S ultra knotless suture anchor    [employer] [id number] Right 1 Implanted   bone anchors (3)    [employer] [id number] Right 1 Implanted   IMP ANCHOR TENDOM S&N REGENETEN BIOINDUTIVE 8 2504-1 - [id number] Metallic Hardware/[place] IMP ANCHOR TENDOM S&N REGENETEN BIOINDUTIVE 8 2504-1  [employer] [id number] Right 1 Implanted   bioninductive implant w/ arthro del pkg large    [employer] [id number] Right 1 Implanted     Postop plan:  To PACU in stable condition.  Okay for discharge if pain controlled and passing checks appropriately.  Otherwise, possible admission for observation  Nonweightbearing right upper extremity in sling at all times.  Okay to restart home anticoagulation tomorrow.  Andrea Moeller MD on 8/14/2024 at 3:20 PM

## 2024-08-14 NOTE — ANESTHESIA CARE TRANSFER NOTE
Patient: Fer Myles    Procedure: Procedure(s):  Right shoulder arthroscopy, debridement, decompression, rotator cuff repair, biceps tenodesis, use of allograft       Diagnosis: Complete rotator cuff tear [M75.120]  Diagnosis Additional Information: No value filed.    Anesthesia Type:   General     Note:    Oropharynx: oropharynx clear of all foreign objects and spontaneously breathing  Level of Consciousness: awake  Oxygen Supplementation: face mask  Level of Supplemental Oxygen (L/min / FiO2): 10  Independent Airway: airway patency satisfactory and stable  Dentition: dentition unchanged  Vital Signs Stable: post-procedure vital signs reviewed and stable  Report to RN Given: handoff report given  Patient transferred to: PACU    Handoff Report: Identifed the Patient, Identified the Reponsible Provider, Reviewed the pertinent medical history, Discussed the surgical course, Reviewed Intra-OP anesthesia mangement and issues during anesthesia, Set expectations for post-procedure period and Allowed opportunity for questions and acknowledgement of understanding      Vitals:  Vitals Value Taken Time   /64 08/14/24 1538   Temp     Pulse 56 08/14/24 1541   Resp 20 08/14/24 1541   SpO2 100 % 08/14/24 1541   Vitals shown include unfiled device data.    Electronically Signed By: OLGA La CRNA  August 14, 2024  3:43 PM

## 2024-08-14 NOTE — OP NOTE
OPERATIVE NOTE    Name: Fer Myles  MRN: [id number]  Age: 63 year old    YOB: 1960    Date of Procedure: 8/14/2024      Pre-operative Diagnosis:   Massive rotator cuff tear involving supraspinatus, infraspinatus, subscapularis, teres minor, biceps tenosynovitis    Post-operative Diagnosis:    Same    Procedure:   Right shoulder arthroscopy with debridement, rotator cuff repair, placement of bio inductive implant, biceps tenodesis  Assistant:  Maico Biswas PA-C    A skilled first assistant was necessary for this procedure for assistance with patient positioning, prepping, draping, surgical visualization, wound closure, and application of the dressing.     Anesthesia:  1.  General With regional block    Complications:  None    Estimated blood loss:   50 mL    Transfusions:  None    Fluids:  Per anesthesia    Specimens:  None    Components:  [employer]hew 5.5 helicoil PK x 3  2.8mm Q fix x 2  5.5 MultiFix S-Ultra knotless X 2  Regeneten Implant (31 mm x 25 mm) with PDS staples    Indications:   This is a 63-year-old man who presented to the emergency department in early May with a right shoulder dislocation.  He underwent a closed reduction in the operating room due to difficulty getting this reduced in the ER.  This was successfully reduced we continue to have significant pain and difficulty with any range of motion, and an MRI was obtained.  This confirmed a massive rotator cuff tear involving supraspinatus, infraspinatus, subscapularis and teres minor.  We had an extensive discussion regarding treatment options for this.  He did have some fatty infiltration/atrophy of the rotator cuff seen on the MRI preoperatively, and we discussed that a primary repair for a rotator cuff tear of this size would have a fairly high rate of retear.  We discussed continue with nonsurgical treatment and therapy, attempting a primary repair with bio inductive augmentation, tendon transfer, as well as a conversion  to a reverse total shoulder arthroplasty.  We went into each of these options in great detail in the office.  Given his age, activity level, and past history particularly his use of blood thinners and diabetes, he elected to proceed with a primary rotator cuff repair with bio inductive augmentation.  He did have a foot surgery for diabetes with an open wound and this delayed his surgery until these wounds were able to be addressed appropriately and closed.    Informed Consent:  Preoperatively, the risks, benefits, and possible complications of the procedure were discussed. The risks discussed included but were not limited to wound healing complications, bleeding, transfusion, stiffness and rotator cuff re tear, development of and persistent pain.  We also discussed VTE and more serious morbidity/mortality.  We discussed the potential for revision repair versus conversion to arthroplasty.  All of the questions were satisfactorily answered and the patient wished to proceed with surgery to improve their lifestyle and reduce their pain.         Description of Procedure:   Fer Myles was encountered in the preoperative area and consent was obtained from the patient/family with the patient awake and the correct operative site was marked. The patient was then brought back to the operative suite, placed in the supine position and brought under general anesthesia by the anesthesia provider.  A preoperative regional block was instilled.  The patient was then taken up into the beachchair position.  All bony prominences well-padded.  The patient was prepped and draped in the normal sterile fashion.  Prior to beginning a timeout was performed.         Procedural Pause:   The patient's correct identity, side, site, and procedure to be performed was verified.  Intravenous Ancef were administered prior to skin incision.    Procedure details  Attention was then turned towards the patient.     A posterior incision was made for a  standard posterior arthroscopic portal and the arthroscope was introduced into the shoulder.  A diagnostic arthroscopy was performed.  There was a significant amount of erythema and tenosynovitis in the shoulder consistent with a acute on chronic trauma.  An anterior portal was established and a tenosynovectomy/debridement was performed to improve visualization.  The biceps was encountered and found to be completely dislocated out of the groove.  Therefore a tenodesis was performed.  First the biceps was transected off the superior labrum.  An open subpectoral tenodesis was performed in Ellen's lines at the axilla.  Dissection was carried through skin and subcutaneous tissue.  The interval under the pectoralis was identified and the tendon was brought through this wound and out.  Using a Q fix 2.8 mm anchor this was drilled and able to achieve excellent purchase.  This was double loaded and 1 of each set of limbs was passed in a grasping locking fashion into the tendon at the appropriate tension.  This was then reduced into the wound using a tension slide technique and tied.  The remaining tendon was cut and was able to restore the tension of the biceps.  Attention was turned back towards the arthroscopy portion.  The arthroscope was reintroduced and attention was turned towards the subscapularis.  This was very scarred in place and an extensive debridement was performed both anterior and posterior to the tendon to assist with mobilization.  Ultimately the upper border was able to be partially reduced and using one 5.5 mm peek anchor this was tapped and then placed into the lesser tuberosity.  These were then passed through the subscapularis and was able to achieve good purchase and fixation.  Of note, the tendon was found to be very tendinopathic and degenerative here at the subscapularis.  Attention was then turned towards the remainder of the repair.  The arthroscope was introduced into the subacromial space.   An inventory was taken of the tendon.  Overall there was a fairly intact cuff of tendon that had retracted to about the middle of the joint.  Again, there was some tendinopathy seen here and there were multiple tear lines that had to be accounted for in the repair.  Initially three 5.5 helicoil anchors were placed at the articular margin.  Each of these was double loaded and great care was taken and passing each side of limbs.  A pontine the most anterior anchor, unfortunately the anchor pulled through the bone due to the very poor bone quality.  Therefore, an additional Q fix 2.8 mm anchor was placed just anteriorly to this was able to achieve good purchase and fixation.  This was then passed through the tendon.  Each of the corresponding limbs were tied and this was able to achieve a fairly good reduction of the tendon back to the footprint.  A lateral row was achieved using 2 MultiFix anchors.  Again the bone quality was quite poor.  A good double row repair was able to be achieved and again the repair was inspected from multiple vantage points.  Due to the significant tendinopathic nature of this tendon as well as the overall size of this tear, the decision was made to proceed with augmentation with a bio inductive implant to assist with healing.  This was introduced through the lateral portal and was laid over the repair and was able to bridge this area.  This was then secured in place using the appropriate PDS anchors.  This was able to achieve an excellent distribution of the graft over the tendon.  A final amatory was taken showing excellent reduction of the tendon with the graft over the top and what appeared to be a fairly intact and robust repair.  All instruments removed from the subacromial space and fluid was allowed to extravasate.  The tenodesis incision was closed with 2-0 Vicryl deep dermal suture and a 3-0 Monocryl was used for the portal sites in a deep dermal fashion.  Mastisol and Steri-Strips  were applied to the portal sites and Dermabond was applied to the tenodesis.  The patient was then recovered from anesthesia in stable condition and taken to PACU.  There were no apparent complications.      I was present for all major portions of this procedure. All sponge, needle and instrument counts were correct at the end of the case.     Postoperative plan:       The patient will be in the sling likely for at least 6 weeks given the massive size of this rotator cuff tear.  He will follow-up in 2 weeks for a wound check.  He will start physical therapy after 6 weeks.      Andrea Moeller MD

## (undated) DEVICE — SOL NACL 0.9% IRRIG 1000ML BOTTLE 2F7124

## (undated) DEVICE — DRSG KERLIX FLUFFS X5

## (undated) DEVICE — SYR BULB IRRIG 50ML LATEX FREE 0035280

## (undated) DEVICE — DRSG ABDOMINAL 07 1/2X8" 7197D

## (undated) DEVICE — SOL NACL 0.9% IRRIG 3000ML BAG 2B7477

## (undated) DEVICE — SUTURE PASSER SU CAPTURE SYS S&N FIRSTPASS 22-4038

## (undated) DEVICE — PACK EXTREMITY SOP15EXFSD

## (undated) DEVICE — LINEN TOWEL PACK X5 5464

## (undated) DEVICE — ESU GROUND PAD UNIVERSAL W/O CORD

## (undated) DEVICE — FOOTPRINT ULTRA PK SUTURE ANCHOR 5.5
Type: IMPLANTABLE DEVICE | Site: SHOULDER | Status: NON-FUNCTIONAL
Brand: FOOTPRINT

## (undated) DEVICE — ENDO BITE BLOCK ADULT OLYMPUS LATEX FREE MAJ-1632

## (undated) DEVICE — ESU GROUND PAD ADULT W/CORD E7507

## (undated) DEVICE — DRSG TEGADERM 4X4 3/4" 1626W

## (undated) DEVICE — SU DERMABOND ADVANCED .7ML DNX12

## (undated) DEVICE — BNDG ROLLER GAUZE CONFORM 4"X4YD 41-54

## (undated) DEVICE — DRAPE IOBAN INCISE 23X17" 6650EZ

## (undated) DEVICE — SU PROLENE 4-0 FS-2 18' 8683G

## (undated) DEVICE — TUBING SUCTION 12"X1/4" N612

## (undated) DEVICE — GLOVE BIOGEL PI SZ 6.5 40865

## (undated) DEVICE — KIT ENDO TURNOVER/PROCEDURE W/CLEAN A SCOPE LINERS 103888

## (undated) DEVICE — DECANTER BAG 2002S

## (undated) DEVICE — CAST PADDING 4" STERILE 9044S

## (undated) DEVICE — SU VICRYL 2-0 CT-1 36" UND J945H

## (undated) DEVICE — DRSG GAUZE 4X4" TRAY

## (undated) DEVICE — SOL WATER IRRIG 1000ML BOTTLE 2F7114

## (undated) DEVICE — KIT ANCHOR SU Q-FIX 2.8MM W/DRILL GD OBTURATOR 25-2810

## (undated) DEVICE — BLADE KNIFE SURG 15 371115

## (undated) DEVICE — DRAPE POUCH IRR 1016

## (undated) DEVICE — SUTURE PASSER 12" 72201406

## (undated) DEVICE — CAST PADDING 4" UNSTERILE 9044

## (undated) DEVICE — ESU PENCIL W/HOLSTER E2350H

## (undated) DEVICE — GLOVE BIOGEL PI MICRO INDICATOR UNDERGLOVE SZ 7.0 48970

## (undated) DEVICE — SUCTION MANIFOLD NEPTUNE 2 SYS 4 PORT 0702-020-000

## (undated) DEVICE — GLOVE BIOGEL PI ULTRATOUCH SZ 8.0 41180

## (undated) DEVICE — DRAPE CONVERTORS U-DRAPE 60X72" 8476

## (undated) DEVICE — SU MONOCRYL 3-0 PS-2 27" Y427H

## (undated) DEVICE — SU VICRYL 2-0 CP-2 27" UND J869H

## (undated) DEVICE — TUBING SUCTION MEDI-VAC SOFT 3/16"X20' N520A

## (undated) DEVICE — SUCTION TIP YANKAUER W/O VENT K86

## (undated) DEVICE — ABLATOR ARTHREX APOLLO RF MP90 ASPIRATING 90DEG AR-9811

## (undated) DEVICE — ESU GROUND PAD E7506

## (undated) DEVICE — LINEN ORTHO ACL PACK 5447

## (undated) DEVICE — DRSG STERI STRIP 1/2X4" R1547

## (undated) DEVICE — LINEN FULL SHEET 5511

## (undated) DEVICE — PACK SHOULDER RIDGES

## (undated) DEVICE — LINEN HALF SHEET 5512

## (undated) DEVICE — GLOVE BIOGEL PI MICRO SZ 8.0 48580

## (undated) DEVICE — PREP CHLORAPREP 26ML TINTED HI-LITE ORANGE 930815

## (undated) DEVICE — GLOVE BIOGEL PI MICRO INDICATOR UNDERGLOVE SZ 8.0 48980

## (undated) DEVICE — NDL 19GA 1.5"

## (undated) DEVICE — KIT SHOULDER POSITIONING BEACH CHAIR ARM HOLDER AR-1644

## (undated) DEVICE — BLADE KNIFE SURG 10 371110

## (undated) DEVICE — IMM LIMB ELEVATOR DC40-0203

## (undated) DEVICE — SLING ARM LG 0814-0064

## (undated) DEVICE — BLADE SAW SAGITTAL 25.5X9.5X.4MM FINE LINVATEC 5023-138

## (undated) DEVICE — ARTHROSCOPIC CANNULA CLEAR-TRAC 8.0X72MM 72200425

## (undated) DEVICE — PREP SKIN SCRUB TRAY 4461A

## (undated) DEVICE — Device

## (undated) DEVICE — DRAPE ARTHROSCOPY SHOULDER BEACHCHAIR 29369

## (undated) DEVICE — BAG CLEAR TRASH 1.3M 39X33" P4040C

## (undated) DEVICE — BUR ARTHREX COOLCUT DISSECTOR 4.0MMX13CM AR-8400DS

## (undated) DEVICE — SU PROLENE 2-0 FS 18" 8685H

## (undated) RX ORDER — VASOPRESSIN 20 U/ML
INJECTION PARENTERAL
Status: DISPENSED
Start: 2024-01-01

## (undated) RX ORDER — FENTANYL CITRATE 0.05 MG/ML
INJECTION, SOLUTION INTRAMUSCULAR; INTRAVENOUS
Status: DISPENSED
Start: 2022-02-20

## (undated) RX ORDER — LIDOCAINE HYDROCHLORIDE 20 MG/ML
INJECTION, SOLUTION INFILTRATION; PERINEURAL
Status: DISPENSED
Start: 2023-01-01

## (undated) RX ORDER — PROPOFOL 10 MG/ML
INJECTION, EMULSION INTRAVENOUS
Status: DISPENSED
Start: 2024-01-01

## (undated) RX ORDER — EPHEDRINE SULFATE 50 MG/ML
INJECTION, SOLUTION INTRAMUSCULAR; INTRAVENOUS; SUBCUTANEOUS
Status: DISPENSED
Start: 2023-01-01

## (undated) RX ORDER — FENTANYL CITRATE 50 UG/ML
INJECTION, SOLUTION INTRAMUSCULAR; INTRAVENOUS
Status: DISPENSED
Start: 2024-01-01

## (undated) RX ORDER — ONDANSETRON 2 MG/ML
INJECTION INTRAMUSCULAR; INTRAVENOUS
Status: DISPENSED
Start: 2022-10-31

## (undated) RX ORDER — FENTANYL CITRATE 50 UG/ML
INJECTION, SOLUTION INTRAMUSCULAR; INTRAVENOUS
Status: DISPENSED
Start: 2023-01-01

## (undated) RX ORDER — OXYCODONE HYDROCHLORIDE 5 MG/1
TABLET ORAL
Status: DISPENSED
Start: 2022-10-31

## (undated) RX ORDER — DEXAMETHASONE SODIUM PHOSPHATE 4 MG/ML
INJECTION, SOLUTION INTRA-ARTICULAR; INTRALESIONAL; INTRAMUSCULAR; INTRAVENOUS; SOFT TISSUE
Status: DISPENSED
Start: 2022-10-31

## (undated) RX ORDER — CEFAZOLIN SODIUM/WATER 2 G/20 ML
SYRINGE (ML) INTRAVENOUS
Status: DISPENSED
Start: 2024-01-01

## (undated) RX ORDER — PROPOFOL 10 MG/ML
INJECTION, EMULSION INTRAVENOUS
Status: DISPENSED
Start: 2023-01-01

## (undated) RX ORDER — ONDANSETRON 2 MG/ML
INJECTION INTRAMUSCULAR; INTRAVENOUS
Status: DISPENSED
Start: 2024-01-01

## (undated) RX ORDER — TRANEXAMIC ACID 10 MG/ML
INJECTION, SOLUTION INTRAVENOUS
Status: DISPENSED
Start: 2024-01-01

## (undated) RX ORDER — LIDOCAINE HYDROCHLORIDE 10 MG/ML
INJECTION, SOLUTION EPIDURAL; INFILTRATION; INTRACAUDAL; PERINEURAL
Status: DISPENSED
Start: 2024-01-01

## (undated) RX ORDER — BUPIVACAINE HYDROCHLORIDE 5 MG/ML
INJECTION, SOLUTION EPIDURAL; INTRACAUDAL
Status: DISPENSED
Start: 2024-01-01

## (undated) RX ORDER — ONDANSETRON 2 MG/ML
INJECTION INTRAMUSCULAR; INTRAVENOUS
Status: DISPENSED
Start: 2023-01-01

## (undated) RX ORDER — DEXAMETHASONE SODIUM PHOSPHATE 4 MG/ML
INJECTION, SOLUTION INTRA-ARTICULAR; INTRALESIONAL; INTRAMUSCULAR; INTRAVENOUS; SOFT TISSUE
Status: DISPENSED
Start: 2023-01-01

## (undated) RX ORDER — DEXAMETHASONE SODIUM PHOSPHATE 4 MG/ML
INJECTION, SOLUTION INTRA-ARTICULAR; INTRALESIONAL; INTRAMUSCULAR; INTRAVENOUS; SOFT TISSUE
Status: DISPENSED
Start: 2024-01-01

## (undated) RX ORDER — FENTANYL CITRATE 50 UG/ML
INJECTION, SOLUTION INTRAMUSCULAR; INTRAVENOUS
Status: DISPENSED
Start: 2022-10-31

## (undated) RX ORDER — LIDOCAINE HYDROCHLORIDE 10 MG/ML
INJECTION, SOLUTION EPIDURAL; INFILTRATION; INTRACAUDAL; PERINEURAL
Status: DISPENSED
Start: 2022-10-31

## (undated) RX ORDER — PROPOFOL 10 MG/ML
INJECTION, EMULSION INTRAVENOUS
Status: DISPENSED
Start: 2022-10-31

## (undated) RX ORDER — SIMETHICONE 40MG/0.6ML
SUSPENSION, DROPS(FINAL DOSAGE FORM)(ML) ORAL
Status: DISPENSED
Start: 2022-02-20

## (undated) RX ORDER — GLYCOPYRROLATE 0.2 MG/ML
INJECTION INTRAMUSCULAR; INTRAVENOUS
Status: DISPENSED
Start: 2022-10-31